# Patient Record
Sex: MALE | Race: WHITE | NOT HISPANIC OR LATINO | Employment: FULL TIME | URBAN - METROPOLITAN AREA
[De-identification: names, ages, dates, MRNs, and addresses within clinical notes are randomized per-mention and may not be internally consistent; named-entity substitution may affect disease eponyms.]

---

## 2018-01-02 ENCOUNTER — OFFICE VISIT CONVERTED (OUTPATIENT)
Dept: SURGERY | Facility: CLINIC | Age: 58
End: 2018-01-02
Attending: UROLOGY

## 2018-01-24 ENCOUNTER — CONVERSION ENCOUNTER (OUTPATIENT)
Dept: FAMILY MEDICINE CLINIC | Facility: CLINIC | Age: 58
End: 2018-01-24

## 2018-01-24 ENCOUNTER — OFFICE VISIT CONVERTED (OUTPATIENT)
Dept: FAMILY MEDICINE CLINIC | Facility: CLINIC | Age: 58
End: 2018-01-24
Attending: NURSE PRACTITIONER

## 2018-02-07 ENCOUNTER — CONVERSION ENCOUNTER (OUTPATIENT)
Dept: FAMILY MEDICINE CLINIC | Facility: CLINIC | Age: 58
End: 2018-02-07

## 2018-02-07 ENCOUNTER — OFFICE VISIT CONVERTED (OUTPATIENT)
Dept: FAMILY MEDICINE CLINIC | Facility: CLINIC | Age: 58
End: 2018-02-07
Attending: NURSE PRACTITIONER

## 2018-03-30 ENCOUNTER — CONVERSION ENCOUNTER (OUTPATIENT)
Dept: SURGERY | Facility: CLINIC | Age: 58
End: 2018-03-30

## 2018-03-30 ENCOUNTER — OFFICE VISIT CONVERTED (OUTPATIENT)
Dept: SURGERY | Facility: CLINIC | Age: 58
End: 2018-03-30
Attending: UROLOGY

## 2018-04-16 ENCOUNTER — OFFICE VISIT CONVERTED (OUTPATIENT)
Dept: SURGERY | Facility: CLINIC | Age: 58
End: 2018-04-16
Attending: UROLOGY

## 2018-05-01 ENCOUNTER — PROCEDURE VISIT CONVERTED (OUTPATIENT)
Dept: SURGERY | Facility: CLINIC | Age: 58
End: 2018-05-01
Attending: UROLOGY

## 2018-05-08 ENCOUNTER — OFFICE VISIT CONVERTED (OUTPATIENT)
Dept: SURGERY | Facility: CLINIC | Age: 58
End: 2018-05-08
Attending: UROLOGY

## 2018-07-05 ENCOUNTER — OFFICE VISIT CONVERTED (OUTPATIENT)
Dept: FAMILY MEDICINE CLINIC | Facility: CLINIC | Age: 58
End: 2018-07-05
Attending: FAMILY MEDICINE

## 2018-07-19 ENCOUNTER — OFFICE VISIT CONVERTED (OUTPATIENT)
Dept: FAMILY MEDICINE CLINIC | Facility: CLINIC | Age: 58
End: 2018-07-19
Attending: FAMILY MEDICINE

## 2018-08-28 ENCOUNTER — CONVERSION ENCOUNTER (OUTPATIENT)
Dept: FAMILY MEDICINE CLINIC | Facility: CLINIC | Age: 58
End: 2018-08-28

## 2018-08-28 ENCOUNTER — OFFICE VISIT CONVERTED (OUTPATIENT)
Dept: FAMILY MEDICINE CLINIC | Facility: CLINIC | Age: 58
End: 2018-08-28
Attending: NURSE PRACTITIONER

## 2018-09-19 ENCOUNTER — OFFICE VISIT CONVERTED (OUTPATIENT)
Dept: GASTROENTEROLOGY | Facility: CLINIC | Age: 58
End: 2018-09-19
Attending: NURSE PRACTITIONER

## 2018-11-08 ENCOUNTER — OFFICE VISIT CONVERTED (OUTPATIENT)
Dept: GASTROENTEROLOGY | Facility: CLINIC | Age: 58
End: 2018-11-08
Attending: NURSE PRACTITIONER

## 2018-12-06 ENCOUNTER — CONVERSION ENCOUNTER (OUTPATIENT)
Dept: OTHER | Facility: HOSPITAL | Age: 58
End: 2018-12-06

## 2018-12-06 ENCOUNTER — OFFICE VISIT CONVERTED (OUTPATIENT)
Dept: GASTROENTEROLOGY | Facility: CLINIC | Age: 58
End: 2018-12-06
Attending: NURSE PRACTITIONER

## 2018-12-10 ENCOUNTER — OFFICE VISIT CONVERTED (OUTPATIENT)
Dept: FAMILY MEDICINE CLINIC | Facility: CLINIC | Age: 58
End: 2018-12-10
Attending: FAMILY MEDICINE

## 2018-12-10 ENCOUNTER — CONVERSION ENCOUNTER (OUTPATIENT)
Dept: FAMILY MEDICINE CLINIC | Facility: CLINIC | Age: 58
End: 2018-12-10

## 2018-12-20 ENCOUNTER — CONVERSION ENCOUNTER (OUTPATIENT)
Dept: FAMILY MEDICINE CLINIC | Facility: CLINIC | Age: 58
End: 2018-12-20

## 2018-12-20 ENCOUNTER — OFFICE VISIT CONVERTED (OUTPATIENT)
Dept: FAMILY MEDICINE CLINIC | Facility: CLINIC | Age: 58
End: 2018-12-20
Attending: FAMILY MEDICINE

## 2019-01-08 ENCOUNTER — HOSPITAL ENCOUNTER (OUTPATIENT)
Dept: CARDIOLOGY | Facility: HOSPITAL | Age: 59
Discharge: HOME | End: 2019-01-08
Attending: FAMILY MEDICINE

## 2019-01-21 ENCOUNTER — HOSPITAL ENCOUNTER (OUTPATIENT)
Dept: FAMILY MEDICINE CLINIC | Facility: CLINIC | Age: 59
Discharge: HOME | End: 2019-01-21
Attending: FAMILY MEDICINE

## 2019-01-21 ENCOUNTER — OFFICE VISIT CONVERTED (OUTPATIENT)
Dept: FAMILY MEDICINE CLINIC | Facility: CLINIC | Age: 59
End: 2019-01-21
Attending: FAMILY MEDICINE

## 2019-01-21 LAB
BASOPHILS # BLD AUTO: 0 10*3/UL (ref 0–0.2)
BASOPHILS NFR BLD AUTO: 0 % (ref 0–3)
EOSINOPHIL # BLD AUTO: 0.09 10*3/UL (ref 0–0.7)
EOSINOPHIL # BLD AUTO: 1.31 % (ref 0–7)
ERYTHROCYTE [DISTWIDTH] IN BLOOD BY AUTOMATED COUNT: 13.4 % (ref 11.5–14.5)
HBA1C MFR BLD: 14.8 G/DL (ref 14–18)
HCT VFR BLD AUTO: 42.3 % (ref 42–52)
IRON SATN MFR SERPL: 13 % (ref 20–55)
IRON SERPL-MCNC: 50 UG/DL (ref 70–180)
LYMPHOCYTES # BLD AUTO: 2.42 10*3/UL (ref 1–5)
MCH RBC QN AUTO: 31.8 PG (ref 27–31)
MCHC RBC AUTO-ENTMCNC: 34.9 G/DL (ref 33–37)
MCV RBC AUTO: 91 FL (ref 80–96)
MONOCYTES # BLD AUTO: 0.42 10*3/UL (ref 0.2–1.2)
MONOCYTES NFR BLD AUTO: 6.34 % (ref 3–10)
NEUTROPHILS # BLD AUTO: 3.72 10*3/UL (ref 2–8)
NEUTROPHILS NFR BLD AUTO: 55.9 % (ref 30–85)
NRBC BLD AUTO-RTO: 0 % (ref 0–0.01)
PLATELET # BLD AUTO: 157 10*3/UL (ref 130–400)
PMV BLD AUTO: 8.7 FL (ref 7.4–10.4)
RBC # BLD AUTO: 4.65 10*6/UL (ref 4.7–6.1)
TIBC SERPL-MCNC: 383 UG/DL (ref 245–450)
TRANSFERRIN SERPL-MCNC: 268 MG/DL (ref 215–365)
VARIANT LYMPHS NFR BLD MANUAL: 36.4 % (ref 20–45)
WBC # BLD AUTO: 6.64 10*3/UL (ref 4.8–10.8)

## 2019-02-05 ENCOUNTER — HOSPITAL ENCOUNTER (OUTPATIENT)
Dept: GASTROENTEROLOGY | Facility: HOSPITAL | Age: 59
Setting detail: HOSPITAL OUTPATIENT SURGERY
Discharge: HOME | End: 2019-02-05
Attending: INTERNAL MEDICINE

## 2019-02-25 ENCOUNTER — HOSPITAL ENCOUNTER (OUTPATIENT)
Dept: ONCOLOGY | Facility: HOSPITAL | Age: 59
Discharge: HOME | End: 2019-02-25
Attending: NURSE PRACTITIONER

## 2019-02-25 LAB
ALBUMIN SERPL-MCNC: 4.1 G/DL (ref 3.5–5)
ALBUMIN/GLOB SERPL: 1.6 {RATIO} (ref 1.4–2.6)
ALP SERPL-CCNC: 103 U/L (ref 56–119)
ALT SERPL-CCNC: 20 U/L (ref 10–40)
ANION GAP SERPL CALC-SCNC: 15 MMOL/L (ref 8–19)
AST SERPL-CCNC: 19 U/L (ref 15–50)
BASOPHILS # BLD AUTO: 0.02 10*3/UL (ref 0–0.2)
BASOPHILS NFR BLD AUTO: 0.4 % (ref 0–3)
BILIRUB SERPL-MCNC: 0.44 MG/DL (ref 0.2–1.3)
BUN SERPL-MCNC: 10 MG/DL (ref 5–25)
BUN/CREAT SERPL: 12 {RATIO} (ref 6–20)
CALCIUM SERPL-MCNC: 9.3 MG/DL (ref 8.7–10.4)
CHLORIDE SERPL-SCNC: 104 MMOL/L (ref 99–111)
CONV ABS IMM GRAN: 0.01 10*3/UL (ref 0–0.2)
CONV CO2: 28 MMOL/L (ref 22–32)
CONV IMMATURE GRAN: 0.2 % (ref 0–1.8)
CONV TOTAL PROTEIN: 6.6 G/DL (ref 6.3–8.2)
CREAT UR-MCNC: 0.82 MG/DL (ref 0.7–1.2)
DEPRECATED RDW RBC AUTO: 46 FL (ref 35.1–43.9)
EOSINOPHIL # BLD AUTO: 0.14 10*3/UL (ref 0–0.7)
EOSINOPHIL # BLD AUTO: 2.5 % (ref 0–7)
ERYTHROCYTE [DISTWIDTH] IN BLOOD BY AUTOMATED COUNT: 13.5 % (ref 11.6–14.4)
FERRITIN SERPL-MCNC: 106 NG/ML (ref 30–300)
GFR SERPLBLD BASED ON 1.73 SQ M-ARVRAT: >60 ML/MIN/{1.73_M2}
GLOBULIN UR ELPH-MCNC: 2.5 G/DL (ref 2–3.5)
GLUCOSE SERPL-MCNC: 96 MG/DL (ref 70–99)
HBA1C MFR BLD: 14 G/DL (ref 14–18)
HCT VFR BLD AUTO: 42.8 % (ref 42–52)
IRON SATN MFR SERPL: 13 % (ref 20–55)
IRON SERPL-MCNC: 48 UG/DL (ref 70–180)
LYMPHOCYTES # BLD AUTO: 2.28 10*3/UL (ref 1–5)
MCH RBC QN AUTO: 30.4 PG (ref 27–31)
MCHC RBC AUTO-ENTMCNC: 32.7 G/DL (ref 33–37)
MCV RBC AUTO: 92.8 FL (ref 80–96)
MONOCYTES # BLD AUTO: 0.38 10*3/UL (ref 0.2–1.2)
MONOCYTES NFR BLD AUTO: 6.7 % (ref 3–10)
NEUTROPHILS # BLD AUTO: 2.86 10*3/UL (ref 2–8)
NEUTROPHILS NFR BLD AUTO: 50.1 % (ref 30–85)
NRBC CBCN: 0 % (ref 0–0.7)
OSMOLALITY SERPL CALC.SUM OF ELEC: 295 MOSM/KG (ref 273–304)
PLATELET # BLD AUTO: 143 10*3/UL (ref 130–400)
PMV BLD AUTO: 11.1 FL (ref 9.4–12.4)
POTASSIUM SERPL-SCNC: 4.3 MMOL/L (ref 3.5–5.3)
RBC # BLD AUTO: 4.61 10*6/UL (ref 4.7–6.1)
SODIUM SERPL-SCNC: 143 MMOL/L (ref 135–147)
TIBC SERPL-MCNC: 365 UG/DL (ref 245–450)
TRANSFERRIN SERPL-MCNC: 255 MG/DL (ref 215–365)
VARIANT LYMPHS NFR BLD MANUAL: 40.1 % (ref 20–45)
WBC # BLD AUTO: 5.69 10*3/UL (ref 4.8–10.8)

## 2019-06-04 ENCOUNTER — OFFICE VISIT CONVERTED (OUTPATIENT)
Dept: SURGERY | Facility: CLINIC | Age: 59
End: 2019-06-04
Attending: UROLOGY

## 2019-06-05 ENCOUNTER — HOSPITAL ENCOUNTER (OUTPATIENT)
Dept: OTHER | Facility: HOSPITAL | Age: 59
Discharge: HOME | End: 2019-06-05

## 2019-06-05 LAB — PSA SERPL-MCNC: 12.15 NG/ML (ref 0–4)

## 2019-06-27 ENCOUNTER — HOSPITAL ENCOUNTER (OUTPATIENT)
Dept: ONCOLOGY | Facility: HOSPITAL | Age: 59
Discharge: HOME | End: 2019-06-27
Attending: NURSE PRACTITIONER

## 2019-06-27 LAB
BASOPHILS # BLD AUTO: 0.02 10*3/UL (ref 0–0.2)
BASOPHILS NFR BLD AUTO: 0.3 % (ref 0–3)
CONV ABS IMM GRAN: 0.03 10*3/UL (ref 0–0.2)
CONV IMMATURE GRAN: 0.4 % (ref 0–1.8)
DEPRECATED RDW RBC AUTO: 49.7 FL (ref 35.1–43.9)
EOSINOPHIL # BLD AUTO: 0.02 10*3/UL (ref 0–0.7)
EOSINOPHIL # BLD AUTO: 0.3 % (ref 0–7)
ERYTHROCYTE [DISTWIDTH] IN BLOOD BY AUTOMATED COUNT: 14.6 % (ref 11.6–14.4)
FERRITIN SERPL-MCNC: 265 NG/ML (ref 30–300)
HBA1C MFR BLD: 13.4 G/DL (ref 14–18)
HCT VFR BLD AUTO: 40.5 % (ref 42–52)
IRON SATN MFR SERPL: 9 % (ref 20–55)
IRON SERPL-MCNC: 31 UG/DL (ref 70–180)
LYMPHOCYTES # BLD AUTO: 1.94 10*3/UL (ref 1–5)
MCH RBC QN AUTO: 30.7 PG (ref 27–31)
MCHC RBC AUTO-ENTMCNC: 33.1 G/DL (ref 33–37)
MCV RBC AUTO: 92.7 FL (ref 80–96)
MONOCYTES # BLD AUTO: 0.62 10*3/UL (ref 0.2–1.2)
MONOCYTES NFR BLD AUTO: 8.2 % (ref 3–10)
NEUTROPHILS # BLD AUTO: 4.9 10*3/UL (ref 2–8)
NEUTROPHILS NFR BLD AUTO: 65 % (ref 30–85)
NRBC CBCN: 0 % (ref 0–0.7)
PLATELET # BLD AUTO: 144 10*3/UL (ref 130–400)
PMV BLD AUTO: 11.1 FL (ref 9.4–12.4)
RBC # BLD AUTO: 4.37 10*6/UL (ref 4.7–6.1)
TIBC SERPL-MCNC: 356 UG/DL (ref 245–450)
TRANSFERRIN SERPL-MCNC: 249 MG/DL (ref 215–365)
VARIANT LYMPHS NFR BLD MANUAL: 25.8 % (ref 20–45)
WBC # BLD AUTO: 7.53 10*3/UL (ref 4.8–10.8)

## 2019-07-22 ENCOUNTER — HOSPITAL ENCOUNTER (OUTPATIENT)
Dept: SURGERY | Facility: CLINIC | Age: 59
Discharge: HOME | End: 2019-07-22

## 2019-07-22 ENCOUNTER — CONVERSION ENCOUNTER (OUTPATIENT)
Dept: SURGERY | Facility: CLINIC | Age: 59
End: 2019-07-22

## 2019-07-22 ENCOUNTER — OFFICE VISIT CONVERTED (OUTPATIENT)
Dept: SURGERY | Facility: CLINIC | Age: 59
End: 2019-07-22
Attending: UROLOGY

## 2019-07-22 LAB
APPEARANCE UR: CLEAR
BILIRUB UR QL: NEGATIVE
COLOR UR: YELLOW
CONV BACTERIA: NEGATIVE
CONV COLLECTION SOURCE (UA): ABNORMAL
CONV CRYSTALS: ABNORMAL /[HPF]
CONV HYALINE CASTS IN URINE MICRO: ABNORMAL /[LPF]
CONV UROBILINOGEN IN URINE BY AUTOMATED TEST STRIP: 0.2 {EHRLICHU}/DL (ref 0.1–1)
GLUCOSE UR QL: NEGATIVE MG/DL
HGB UR QL STRIP: ABNORMAL
KETONES UR QL STRIP: NEGATIVE MG/DL
LEUKOCYTE ESTERASE UR QL STRIP: NEGATIVE
NITRITE UR QL STRIP: NEGATIVE
PH UR STRIP.AUTO: 5 [PH] (ref 5–8)
PROT UR QL: NEGATIVE MG/DL
RBC #/AREA URNS HPF: ABNORMAL /[HPF]
SP GR UR: 1.02 (ref 1–1.03)
WBC #/AREA URNS HPF: ABNORMAL /[HPF]

## 2019-09-19 ENCOUNTER — HOSPITAL ENCOUNTER (OUTPATIENT)
Dept: FAMILY MEDICINE CLINIC | Facility: CLINIC | Age: 59
Discharge: HOME | End: 2019-09-19
Attending: FAMILY MEDICINE

## 2019-09-19 ENCOUNTER — OFFICE VISIT CONVERTED (OUTPATIENT)
Dept: FAMILY MEDICINE CLINIC | Facility: CLINIC | Age: 59
End: 2019-09-19
Attending: FAMILY MEDICINE

## 2019-09-19 LAB
ALBUMIN SERPL-MCNC: 4 G/DL (ref 3.5–5)
ALBUMIN/GLOB SERPL: 1.4 {RATIO} (ref 1.4–2.6)
ALP SERPL-CCNC: 109 U/L (ref 56–155)
ALT SERPL-CCNC: 15 U/L (ref 10–40)
ANION GAP SERPL CALC-SCNC: 23 MMOL/L (ref 8–19)
AST SERPL-CCNC: 19 U/L (ref 15–50)
BASOPHILS # BLD AUTO: 0.03 10*3/UL (ref 0–0.2)
BASOPHILS NFR BLD AUTO: 0.4 % (ref 0–3)
BILIRUB SERPL-MCNC: 0.53 MG/DL (ref 0.2–1.3)
BUN SERPL-MCNC: 15 MG/DL (ref 5–25)
BUN/CREAT SERPL: 17 {RATIO} (ref 6–20)
CALCIUM SERPL-MCNC: 9.2 MG/DL (ref 8.7–10.4)
CHLORIDE SERPL-SCNC: 100 MMOL/L (ref 99–111)
CHOLEST SERPL-MCNC: 131 MG/DL (ref 107–200)
CHOLEST/HDLC SERPL: 3 {RATIO} (ref 3–6)
CONV ABS IMM GRAN: 0.02 10*3/UL (ref 0–0.2)
CONV CO2: 21 MMOL/L (ref 22–32)
CONV IMMATURE GRAN: 0.3 % (ref 0–1.8)
CONV TOTAL PROTEIN: 6.9 G/DL (ref 6.3–8.2)
CREAT UR-MCNC: 0.86 MG/DL (ref 0.7–1.2)
DEPRECATED RDW RBC AUTO: 44.1 FL (ref 35.1–43.9)
EOSINOPHIL # BLD AUTO: 0.07 10*3/UL (ref 0–0.7)
EOSINOPHIL # BLD AUTO: 1 % (ref 0–7)
ERYTHROCYTE [DISTWIDTH] IN BLOOD BY AUTOMATED COUNT: 13.1 % (ref 11.6–14.4)
FOLATE SERPL-MCNC: >20 NG/ML (ref 4.8–20)
GFR SERPLBLD BASED ON 1.73 SQ M-ARVRAT: >60 ML/MIN/{1.73_M2}
GLOBULIN UR ELPH-MCNC: 2.9 G/DL (ref 2–3.5)
GLUCOSE SERPL-MCNC: 97 MG/DL (ref 70–99)
HCT VFR BLD AUTO: 41.2 % (ref 42–52)
HDLC SERPL-MCNC: 44 MG/DL (ref 40–60)
HGB BLD-MCNC: 13.7 G/DL (ref 14–18)
IRON SATN MFR SERPL: 9 % (ref 20–55)
IRON SERPL-MCNC: 30 UG/DL (ref 70–180)
LDLC SERPL CALC-MCNC: 76 MG/DL (ref 70–100)
LYMPHOCYTES # BLD AUTO: 1.91 10*3/UL (ref 1–5)
LYMPHOCYTES NFR BLD AUTO: 28.4 % (ref 20–45)
MCH RBC QN AUTO: 30.7 PG (ref 27–31)
MCHC RBC AUTO-ENTMCNC: 33.3 G/DL (ref 33–37)
MCV RBC AUTO: 92.4 FL (ref 80–96)
MONOCYTES # BLD AUTO: 0.49 10*3/UL (ref 0.2–1.2)
MONOCYTES NFR BLD AUTO: 7.3 % (ref 3–10)
NEUTROPHILS # BLD AUTO: 4.21 10*3/UL (ref 2–8)
NEUTROPHILS NFR BLD AUTO: 62.6 % (ref 30–85)
NRBC CBCN: 0 % (ref 0–0.7)
OSMOLALITY SERPL CALC.SUM OF ELEC: 291 MOSM/KG (ref 273–304)
PLATELET # BLD AUTO: 123 10*3/UL (ref 130–400)
PMV BLD AUTO: 11.1 FL (ref 9.4–12.4)
POTASSIUM SERPL-SCNC: 4 MMOL/L (ref 3.5–5.3)
PSA SERPL-MCNC: 7.37 NG/ML (ref 0–4)
RBC # BLD AUTO: 4.46 10*6/UL (ref 4.7–6.1)
SODIUM SERPL-SCNC: 140 MMOL/L (ref 135–147)
TIBC SERPL-MCNC: 329 UG/DL (ref 245–450)
TRANSFERRIN SERPL-MCNC: 230 MG/DL (ref 215–365)
TRIGL SERPL-MCNC: 56 MG/DL (ref 40–150)
VIT B12 SERPL-MCNC: 360 PG/ML (ref 211–911)
VLDLC SERPL-MCNC: 11 MG/DL (ref 5–37)
WBC # BLD AUTO: 6.73 10*3/UL (ref 4.8–10.8)

## 2019-09-30 ENCOUNTER — OFFICE VISIT CONVERTED (OUTPATIENT)
Dept: FAMILY MEDICINE CLINIC | Facility: CLINIC | Age: 59
End: 2019-09-30
Attending: FAMILY MEDICINE

## 2019-10-02 ENCOUNTER — HOSPITAL ENCOUNTER (OUTPATIENT)
Dept: ONCOLOGY | Facility: HOSPITAL | Age: 59
Discharge: HOME | End: 2019-10-02
Attending: NURSE PRACTITIONER

## 2019-10-04 ENCOUNTER — HOSPITAL ENCOUNTER (OUTPATIENT)
Dept: FAMILY MEDICINE CLINIC | Facility: CLINIC | Age: 59
Discharge: HOME | End: 2019-10-04
Attending: FAMILY MEDICINE

## 2019-10-08 ENCOUNTER — HOSPITAL ENCOUNTER (OUTPATIENT)
Dept: OTHER | Facility: HOSPITAL | Age: 59
Setting detail: RECURRING SERIES
Discharge: HOME | End: 2019-10-31
Attending: NURSE PRACTITIONER

## 2019-11-27 ENCOUNTER — HOSPITAL ENCOUNTER (OUTPATIENT)
Dept: OTHER | Facility: HOSPITAL | Age: 59
Discharge: HOME | End: 2019-11-27
Attending: INTERNAL MEDICINE

## 2019-12-11 ENCOUNTER — HOSPITAL ENCOUNTER (OUTPATIENT)
Dept: OTHER | Facility: HOSPITAL | Age: 59
Discharge: HOME | End: 2019-12-11

## 2019-12-11 LAB — PSA SERPL-MCNC: 7.46 NG/ML (ref 0–4)

## 2019-12-18 ENCOUNTER — CONVERSION ENCOUNTER (OUTPATIENT)
Dept: SURGERY | Facility: CLINIC | Age: 59
End: 2019-12-18

## 2019-12-18 ENCOUNTER — OFFICE VISIT CONVERTED (OUTPATIENT)
Dept: UROLOGY | Facility: CLINIC | Age: 59
End: 2019-12-18
Attending: UROLOGY

## 2020-01-02 ENCOUNTER — HOSPITAL ENCOUNTER (OUTPATIENT)
Dept: ONCOLOGY | Facility: HOSPITAL | Age: 60
Discharge: HOME | End: 2020-01-02
Attending: NURSE PRACTITIONER

## 2020-01-02 LAB
BASOPHILS # BLD AUTO: 0.03 10*3/UL (ref 0–0.2)
BASOPHILS NFR BLD AUTO: 0.4 % (ref 0–3)
CONV ABS IMM GRAN: 0.02 10*3/UL (ref 0–0.2)
CONV IMMATURE GRAN: 0.3 % (ref 0–1.8)
DEPRECATED RDW RBC AUTO: 48.3 FL (ref 35.1–43.9)
EOSINOPHIL # BLD AUTO: 0.15 10*3/UL (ref 0–0.7)
EOSINOPHIL # BLD AUTO: 2 % (ref 0–7)
ERYTHROCYTE [DISTWIDTH] IN BLOOD BY AUTOMATED COUNT: 13.9 % (ref 11.6–14.4)
FERRITIN SERPL-MCNC: 815 NG/ML (ref 30–300)
HCT VFR BLD AUTO: 49.8 % (ref 42–52)
HGB BLD-MCNC: 16.4 G/DL (ref 14–18)
IRON SATN MFR SERPL: 24 % (ref 20–55)
IRON SERPL-MCNC: 89 UG/DL (ref 70–180)
LYMPHOCYTES # BLD AUTO: 2.2 10*3/UL (ref 1–5)
LYMPHOCYTES NFR BLD AUTO: 29.5 % (ref 20–45)
MCH RBC QN AUTO: 31.2 PG (ref 27–31)
MCHC RBC AUTO-ENTMCNC: 32.9 G/DL (ref 33–37)
MCV RBC AUTO: 94.7 FL (ref 80–96)
MONOCYTES # BLD AUTO: 0.49 10*3/UL (ref 0.2–1.2)
MONOCYTES NFR BLD AUTO: 6.6 % (ref 3–10)
NEUTROPHILS # BLD AUTO: 4.57 10*3/UL (ref 2–8)
NEUTROPHILS NFR BLD AUTO: 61.2 % (ref 30–85)
NRBC CBCN: 0 % (ref 0–0.7)
PLATELET # BLD AUTO: 130 10*3/UL (ref 130–400)
PMV BLD AUTO: 11.2 FL (ref 9.4–12.4)
RBC # BLD AUTO: 5.26 10*6/UL (ref 4.7–6.1)
TIBC SERPL-MCNC: 365 UG/DL (ref 245–450)
TRANSFERRIN SERPL-MCNC: 255 MG/DL (ref 215–365)
WBC # BLD AUTO: 7.46 10*3/UL (ref 4.8–10.8)

## 2020-03-25 ENCOUNTER — HOSPITAL ENCOUNTER (OUTPATIENT)
Dept: OTHER | Facility: HOSPITAL | Age: 60
Discharge: HOME | End: 2020-03-25
Attending: NURSE PRACTITIONER

## 2020-03-25 LAB
BASOPHILS # BLD AUTO: 0.05 10*3/UL (ref 0–0.2)
BASOPHILS NFR BLD AUTO: 0.6 % (ref 0–3)
CONV ABS IMM GRAN: 0.03 10*3/UL (ref 0–0.2)
CONV IMMATURE GRAN: 0.4 % (ref 0–1.8)
DEPRECATED RDW RBC AUTO: 46.3 FL (ref 35.1–43.9)
EOSINOPHIL # BLD AUTO: 0.24 10*3/UL (ref 0–0.7)
EOSINOPHIL # BLD AUTO: 2.9 % (ref 0–7)
ERYTHROCYTE [DISTWIDTH] IN BLOOD BY AUTOMATED COUNT: 13.1 % (ref 11.6–14.4)
FERRITIN SERPL-MCNC: 659 NG/ML (ref 30–300)
HCT VFR BLD AUTO: 42.9 % (ref 42–52)
HGB BLD-MCNC: 14.1 G/DL (ref 14–18)
IRON SATN MFR SERPL: 34 % (ref 20–55)
IRON SERPL-MCNC: 102 UG/DL (ref 70–180)
LYMPHOCYTES # BLD AUTO: 3.51 10*3/UL (ref 1–5)
LYMPHOCYTES NFR BLD AUTO: 42.9 % (ref 20–45)
MCH RBC QN AUTO: 32.1 PG (ref 27–31)
MCHC RBC AUTO-ENTMCNC: 32.9 G/DL (ref 33–37)
MCV RBC AUTO: 97.7 FL (ref 80–96)
MONOCYTES # BLD AUTO: 0.47 10*3/UL (ref 0.2–1.2)
MONOCYTES NFR BLD AUTO: 5.7 % (ref 3–10)
NEUTROPHILS # BLD AUTO: 3.88 10*3/UL (ref 2–8)
NEUTROPHILS NFR BLD AUTO: 47.5 % (ref 30–85)
NRBC CBCN: 0 % (ref 0–0.7)
PLATELET # BLD AUTO: 188 10*3/UL (ref 130–400)
PMV BLD AUTO: 12.4 FL (ref 9.4–12.4)
PSA SERPL-MCNC: 9.07 NG/ML (ref 0–4)
RBC # BLD AUTO: 4.39 10*6/UL (ref 4.7–6.1)
TIBC SERPL-MCNC: 303 UG/DL (ref 245–450)
TRANSFERRIN SERPL-MCNC: 212 MG/DL (ref 215–365)
WBC # BLD AUTO: 8.18 10*3/UL (ref 4.8–10.8)

## 2020-04-02 ENCOUNTER — HOSPITAL ENCOUNTER (OUTPATIENT)
Dept: ONCOLOGY | Facility: HOSPITAL | Age: 60
Discharge: HOME | End: 2020-04-02
Attending: NURSE PRACTITIONER

## 2020-04-17 ENCOUNTER — TELEPHONE CONVERTED (OUTPATIENT)
Dept: UROLOGY | Facility: CLINIC | Age: 60
End: 2020-04-17
Attending: UROLOGY

## 2020-06-01 ENCOUNTER — HOSPITAL ENCOUNTER (OUTPATIENT)
Dept: OTHER | Facility: HOSPITAL | Age: 60
Discharge: HOME | End: 2020-06-01
Attending: UROLOGY

## 2020-06-01 LAB — PSA SERPL-MCNC: 8.12 NG/ML (ref 0–4)

## 2020-06-10 ENCOUNTER — OFFICE VISIT CONVERTED (OUTPATIENT)
Dept: UROLOGY | Facility: CLINIC | Age: 60
End: 2020-06-10
Attending: UROLOGY

## 2020-06-10 ENCOUNTER — CONVERSION ENCOUNTER (OUTPATIENT)
Dept: SURGERY | Facility: CLINIC | Age: 60
End: 2020-06-10

## 2020-07-29 ENCOUNTER — HOSPITAL ENCOUNTER (OUTPATIENT)
Dept: OTHER | Facility: HOSPITAL | Age: 60
Discharge: HOME | End: 2020-07-29
Attending: NURSE PRACTITIONER

## 2020-07-29 LAB
ALBUMIN SERPL-MCNC: 4 G/DL (ref 3.5–5)
ALBUMIN/GLOB SERPL: 1.5 {RATIO} (ref 1.4–2.6)
ALP SERPL-CCNC: 93 U/L (ref 56–155)
ALT SERPL-CCNC: 19 U/L (ref 10–40)
ANION GAP SERPL CALC-SCNC: 18 MMOL/L (ref 8–19)
AST SERPL-CCNC: 20 U/L (ref 15–50)
BASOPHILS # BLD AUTO: 0.02 10*3/UL (ref 0–0.2)
BASOPHILS NFR BLD AUTO: 0.3 % (ref 0–3)
BILIRUB SERPL-MCNC: 0.46 MG/DL (ref 0.2–1.3)
BUN SERPL-MCNC: 15 MG/DL (ref 5–25)
BUN/CREAT SERPL: 16 {RATIO} (ref 6–20)
CALCIUM SERPL-MCNC: 9.9 MG/DL (ref 8.7–10.4)
CHLORIDE SERPL-SCNC: 103 MMOL/L (ref 99–111)
CONV ABS IMM GRAN: 0.03 10*3/UL (ref 0–0.2)
CONV CO2: 25 MMOL/L (ref 22–32)
CONV IMMATURE GRAN: 0.4 % (ref 0–1.8)
CONV TOTAL PROTEIN: 6.7 G/DL (ref 6.3–8.2)
CREAT UR-MCNC: 0.93 MG/DL (ref 0.7–1.2)
DEPRECATED RDW RBC AUTO: 45.4 FL (ref 35.1–43.9)
EOSINOPHIL # BLD AUTO: 0.09 10*3/UL (ref 0–0.7)
EOSINOPHIL # BLD AUTO: 1.2 % (ref 0–7)
ERYTHROCYTE [DISTWIDTH] IN BLOOD BY AUTOMATED COUNT: 13.1 % (ref 11.6–14.4)
GFR SERPLBLD BASED ON 1.73 SQ M-ARVRAT: >60 ML/MIN/{1.73_M2}
GLOBULIN UR ELPH-MCNC: 2.7 G/DL (ref 2–3.5)
GLUCOSE SERPL-MCNC: 90 MG/DL (ref 70–99)
HCT VFR BLD AUTO: 43.3 % (ref 42–52)
HGB BLD-MCNC: 14.3 G/DL (ref 14–18)
LDH SERPL-CCNC: 220 U/L (ref 120–240)
LYMPHOCYTES # BLD AUTO: 2.51 10*3/UL (ref 1–5)
LYMPHOCYTES NFR BLD AUTO: 33 % (ref 20–45)
MCH RBC QN AUTO: 31.6 PG (ref 27–31)
MCHC RBC AUTO-ENTMCNC: 33 G/DL (ref 33–37)
MCV RBC AUTO: 95.8 FL (ref 80–96)
MONOCYTES # BLD AUTO: 0.45 10*3/UL (ref 0.2–1.2)
MONOCYTES NFR BLD AUTO: 5.9 % (ref 3–10)
NEUTROPHILS # BLD AUTO: 4.51 10*3/UL (ref 2–8)
NEUTROPHILS NFR BLD AUTO: 59.2 % (ref 30–85)
NRBC CBCN: 0 % (ref 0–0.7)
OSMOLALITY SERPL CALC.SUM OF ELEC: 292 MOSM/KG (ref 273–304)
PLATELET # BLD AUTO: 123 10*3/UL (ref 130–400)
PMV BLD AUTO: 11.2 FL (ref 9.4–12.4)
POTASSIUM SERPL-SCNC: 4.5 MMOL/L (ref 3.5–5.3)
RBC # BLD AUTO: 4.52 10*6/UL (ref 4.7–6.1)
SODIUM SERPL-SCNC: 141 MMOL/L (ref 135–147)
WBC # BLD AUTO: 7.61 10*3/UL (ref 4.8–10.8)

## 2020-08-06 ENCOUNTER — OFFICE VISIT CONVERTED (OUTPATIENT)
Dept: ONCOLOGY | Facility: HOSPITAL | Age: 60
End: 2020-08-06
Attending: NURSE PRACTITIONER

## 2020-08-06 ENCOUNTER — HOSPITAL ENCOUNTER (OUTPATIENT)
Dept: ONCOLOGY | Facility: HOSPITAL | Age: 60
Discharge: HOME | End: 2020-08-06
Attending: NURSE PRACTITIONER

## 2020-08-17 ENCOUNTER — OFFICE VISIT CONVERTED (OUTPATIENT)
Dept: FAMILY MEDICINE CLINIC | Facility: CLINIC | Age: 60
End: 2020-08-17
Attending: FAMILY MEDICINE

## 2020-08-21 ENCOUNTER — HOSPITAL ENCOUNTER (OUTPATIENT)
Dept: FAMILY MEDICINE CLINIC | Facility: CLINIC | Age: 60
Discharge: HOME | End: 2020-08-21
Attending: FAMILY MEDICINE

## 2020-08-21 LAB
ALBUMIN SERPL-MCNC: 4.4 G/DL (ref 3.5–5)
ALBUMIN/GLOB SERPL: 1.9 {RATIO} (ref 1.4–2.6)
ALP SERPL-CCNC: 107 U/L (ref 56–155)
ALT SERPL-CCNC: 15 U/L (ref 10–40)
ANION GAP SERPL CALC-SCNC: 21 MMOL/L (ref 8–19)
AST SERPL-CCNC: 20 U/L (ref 15–50)
BASOPHILS # BLD AUTO: 0.03 10*3/UL (ref 0–0.2)
BASOPHILS NFR BLD AUTO: 0.5 % (ref 0–3)
BILIRUB SERPL-MCNC: 1.04 MG/DL (ref 0.2–1.3)
BUN SERPL-MCNC: 9 MG/DL (ref 5–25)
BUN/CREAT SERPL: 11 {RATIO} (ref 6–20)
CALCIUM SERPL-MCNC: 9.7 MG/DL (ref 8.7–10.4)
CHLORIDE SERPL-SCNC: 99 MMOL/L (ref 99–111)
CHOLEST SERPL-MCNC: 125 MG/DL (ref 107–200)
CHOLEST/HDLC SERPL: 3.1 {RATIO} (ref 3–6)
CONV ABS IMM GRAN: 0.03 10*3/UL (ref 0–0.2)
CONV CO2: 23 MMOL/L (ref 22–32)
CONV IMMATURE GRAN: 0.5 % (ref 0–1.8)
CONV TOTAL PROTEIN: 6.7 G/DL (ref 6.3–8.2)
CREAT UR-MCNC: 0.82 MG/DL (ref 0.7–1.2)
DEPRECATED RDW RBC AUTO: 45.9 FL (ref 35.1–43.9)
EOSINOPHIL # BLD AUTO: 0.05 10*3/UL (ref 0–0.7)
EOSINOPHIL # BLD AUTO: 0.8 % (ref 0–7)
ERYTHROCYTE [DISTWIDTH] IN BLOOD BY AUTOMATED COUNT: 12.9 % (ref 11.6–14.4)
GFR SERPLBLD BASED ON 1.73 SQ M-ARVRAT: >60 ML/MIN/{1.73_M2}
GLOBULIN UR ELPH-MCNC: 2.3 G/DL (ref 2–3.5)
GLUCOSE SERPL-MCNC: 102 MG/DL (ref 70–99)
HCT VFR BLD AUTO: 45 % (ref 42–52)
HDLC SERPL-MCNC: 40 MG/DL (ref 40–60)
HGB BLD-MCNC: 15 G/DL (ref 14–18)
LDLC SERPL CALC-MCNC: 75 MG/DL (ref 70–100)
LYMPHOCYTES # BLD AUTO: 2.03 10*3/UL (ref 1–5)
LYMPHOCYTES NFR BLD AUTO: 30.5 % (ref 20–45)
MCH RBC QN AUTO: 31.8 PG (ref 27–31)
MCHC RBC AUTO-ENTMCNC: 33.3 G/DL (ref 33–37)
MCV RBC AUTO: 95.3 FL (ref 80–96)
MONOCYTES # BLD AUTO: 0.37 10*3/UL (ref 0.2–1.2)
MONOCYTES NFR BLD AUTO: 5.6 % (ref 3–10)
NEUTROPHILS # BLD AUTO: 4.14 10*3/UL (ref 2–8)
NEUTROPHILS NFR BLD AUTO: 62.1 % (ref 30–85)
NRBC CBCN: 0 % (ref 0–0.7)
OSMOLALITY SERPL CALC.SUM OF ELEC: 287 MOSM/KG (ref 273–304)
PLATELET # BLD AUTO: 109 10*3/UL (ref 130–400)
PMV BLD AUTO: 10.9 FL (ref 9.4–12.4)
POTASSIUM SERPL-SCNC: 4.2 MMOL/L (ref 3.5–5.3)
PSA SERPL-MCNC: 8.51 NG/ML (ref 0–4)
RBC # BLD AUTO: 4.72 10*6/UL (ref 4.7–6.1)
SODIUM SERPL-SCNC: 139 MMOL/L (ref 135–147)
TRIGL SERPL-MCNC: 50 MG/DL (ref 40–150)
VLDLC SERPL-MCNC: 10 MG/DL (ref 5–37)
WBC # BLD AUTO: 6.65 10*3/UL (ref 4.8–10.8)

## 2020-12-02 ENCOUNTER — HOSPITAL ENCOUNTER (OUTPATIENT)
Dept: OTHER | Facility: HOSPITAL | Age: 60
Discharge: HOME | End: 2020-12-02
Attending: UROLOGY

## 2020-12-02 LAB — PSA SERPL-MCNC: 7.71 NG/ML (ref 0–4)

## 2020-12-09 ENCOUNTER — OFFICE VISIT CONVERTED (OUTPATIENT)
Dept: UROLOGY | Facility: CLINIC | Age: 60
End: 2020-12-09
Attending: UROLOGY

## 2020-12-18 ENCOUNTER — HOSPITAL ENCOUNTER (OUTPATIENT)
Dept: OTHER | Facility: HOSPITAL | Age: 60
Discharge: HOME | End: 2020-12-18
Attending: INTERNAL MEDICINE

## 2020-12-23 ENCOUNTER — OFFICE VISIT CONVERTED (OUTPATIENT)
Dept: ONCOLOGY | Facility: HOSPITAL | Age: 60
End: 2020-12-23
Attending: PHYSICIAN ASSISTANT

## 2021-02-05 ENCOUNTER — HOSPITAL ENCOUNTER (OUTPATIENT)
Dept: ONCOLOGY | Facility: HOSPITAL | Age: 61
Discharge: HOME | End: 2021-02-05
Attending: NURSE PRACTITIONER

## 2021-02-05 ENCOUNTER — OFFICE VISIT CONVERTED (OUTPATIENT)
Dept: ONCOLOGY | Facility: HOSPITAL | Age: 61
End: 2021-02-05
Attending: NURSE PRACTITIONER

## 2021-02-05 LAB
ALBUMIN SERPL-MCNC: 4.1 G/DL (ref 3.5–5)
ALBUMIN/GLOB SERPL: 1.6 {RATIO} (ref 1.4–2.6)
ALP SERPL-CCNC: 104 U/L (ref 56–155)
ALT SERPL-CCNC: 22 U/L (ref 10–40)
ANION GAP SERPL CALC-SCNC: 13 MMOL/L (ref 8–19)
AST SERPL-CCNC: 21 U/L (ref 15–50)
BASOPHILS # BLD AUTO: 0.01 10*3/UL (ref 0–0.2)
BASOPHILS NFR BLD AUTO: 0.2 % (ref 0–3)
BILIRUB SERPL-MCNC: 0.85 MG/DL (ref 0.2–1.3)
BUN SERPL-MCNC: 13 MG/DL (ref 5–25)
BUN/CREAT SERPL: 17 {RATIO} (ref 6–20)
CALCIUM SERPL-MCNC: 9.2 MG/DL (ref 8.7–10.4)
CHLORIDE SERPL-SCNC: 103 MMOL/L (ref 99–111)
CONV ABS IMM GRAN: 0.02 10*3/UL (ref 0–0.54)
CONV CO2: 27 MMOL/L (ref 22–32)
CONV EOSINOPHILS PERCENT BY MANUAL COUNT: 2.7 % (ref 0–7)
CONV IMMATURE GRAN: 0.4 % (ref 0–0.4)
CONV TOTAL PROTEIN: 6.6 G/DL (ref 6.3–8.2)
CREAT UR-MCNC: 0.75 MG/DL (ref 0.7–1.2)
EOSINOPHIL # BLD MANUAL: 0.14 10*3/UL (ref 0–0.7)
ERYTHROCYTE [DISTWIDTH] IN BLOOD BY AUTOMATED COUNT: 13.1 % (ref 11.5–14.5)
ERYTHROCYTE [DISTWIDTH] IN BLOOD BY AUTOMATED COUNT: 45 FL
FERRITIN SERPL-MCNC: 576 NG/ML (ref 30–300)
FOLATE SERPL-MCNC: 12.7 NG/ML (ref 4.8–20)
GFR SERPLBLD BASED ON 1.73 SQ M-ARVRAT: >60 ML/MIN/{1.73_M2}
GLOBULIN UR ELPH-MCNC: 2.5 G/DL (ref 2–3.5)
GLUCOSE SERPL-MCNC: 100 MG/DL (ref 70–99)
HBA1C MFR BLD: 14.9 G/DL (ref 14–18)
HCT VFR BLD AUTO: 44.6 % (ref 42–52)
IRON SATN MFR SERPL: 36 % (ref 20–55)
IRON SERPL-MCNC: 120 UG/DL (ref 70–180)
LYMPHOCYTES # BLD AUTO: 2.16 10*3/UL (ref 1–5)
LYMPHOCYTES NFR BLD AUTO: 41.9 % (ref 20–45)
MCH RBC QN AUTO: 31.2 PG (ref 27–31)
MCHC RBC AUTO-ENTMCNC: 33.4 G/DL (ref 33–37)
MCV RBC AUTO: 93.5 FL (ref 80–96)
MONOCYTES # BLD AUTO: 0.52 10*3/UL (ref 0.2–1.2)
MONOCYTES NFR BLD MANUAL: 10.1 % (ref 3–10)
NEUTROPHILS # BLD AUTO: 2.31 10*3/UL (ref 2–8)
NEUTROPHILS NFR BLD MANUAL: 44.7 % (ref 30–85)
OSMOLALITY SERPL CALC.SUM OF ELEC: 288 MOSM/KG (ref 273–304)
PLATELET # BLD AUTO: 126 10*3/UL (ref 130–400)
PMV BLD AUTO: 10.7 FL (ref 7.4–10.4)
POTASSIUM SERPL-SCNC: 4.2 MMOL/L (ref 3.5–5.3)
RBC MORPH BLD: 4.77 10*6/UL (ref 4.7–6.1)
SODIUM SERPL-SCNC: 139 MMOL/L (ref 135–147)
TIBC SERPL-MCNC: 336 UG/DL (ref 245–450)
TRANSFERRIN SERPL-MCNC: 235 MG/DL (ref 215–365)
VIT B12 SERPL-MCNC: 458 PG/ML (ref 211–911)
WBC # BLD AUTO: 5.16 10*3/UL (ref 4.8–10.8)

## 2021-02-25 ENCOUNTER — OFFICE VISIT CONVERTED (OUTPATIENT)
Dept: FAMILY MEDICINE CLINIC | Facility: CLINIC | Age: 61
End: 2021-02-25
Attending: FAMILY MEDICINE

## 2021-02-27 ENCOUNTER — HOSPITAL ENCOUNTER (OUTPATIENT)
Dept: FAMILY MEDICINE CLINIC | Facility: CLINIC | Age: 61
Discharge: HOME | End: 2021-02-27
Attending: FAMILY MEDICINE

## 2021-02-27 LAB
ALBUMIN SERPL-MCNC: 3.8 G/DL (ref 3.5–5)
ALBUMIN/GLOB SERPL: 1.6 {RATIO} (ref 1.4–2.6)
ALP SERPL-CCNC: 103 U/L (ref 56–155)
ALT SERPL-CCNC: 16 U/L (ref 10–40)
ANION GAP SERPL CALC-SCNC: 13 MMOL/L (ref 8–19)
AST SERPL-CCNC: 19 U/L (ref 15–50)
BASOPHILS # BLD AUTO: 0.04 10*3/UL (ref 0–0.2)
BASOPHILS NFR BLD AUTO: 0.9 % (ref 0–3)
BILIRUB SERPL-MCNC: 0.86 MG/DL (ref 0.2–1.3)
BUN SERPL-MCNC: 12 MG/DL (ref 5–25)
BUN/CREAT SERPL: 15 {RATIO} (ref 6–20)
CALCIUM SERPL-MCNC: 9 MG/DL (ref 8.7–10.4)
CHLORIDE SERPL-SCNC: 105 MMOL/L (ref 99–111)
CHOLEST SERPL-MCNC: 145 MG/DL (ref 107–200)
CHOLEST/HDLC SERPL: 3.7 {RATIO} (ref 3–6)
CONV ABS IMM GRAN: 0.01 10*3/UL (ref 0–0.2)
CONV CO2: 27 MMOL/L (ref 22–32)
CONV IMMATURE GRAN: 0.2 % (ref 0–1.8)
CONV TOTAL PROTEIN: 6.2 G/DL (ref 6.3–8.2)
CREAT UR-MCNC: 0.79 MG/DL (ref 0.7–1.2)
DEPRECATED RDW RBC AUTO: 45.1 FL (ref 35.1–43.9)
EOSINOPHIL # BLD AUTO: 0.16 10*3/UL (ref 0–0.7)
EOSINOPHIL # BLD AUTO: 3.5 % (ref 0–7)
ERYTHROCYTE [DISTWIDTH] IN BLOOD BY AUTOMATED COUNT: 13.2 % (ref 11.6–14.4)
GFR SERPLBLD BASED ON 1.73 SQ M-ARVRAT: >60 ML/MIN/{1.73_M2}
GLOBULIN UR ELPH-MCNC: 2.4 G/DL (ref 2–3.5)
GLUCOSE SERPL-MCNC: 95 MG/DL (ref 70–99)
HCT VFR BLD AUTO: 43.9 % (ref 42–52)
HDLC SERPL-MCNC: 39 MG/DL (ref 40–60)
HGB BLD-MCNC: 14.7 G/DL (ref 14–18)
LDLC SERPL CALC-MCNC: 92 MG/DL (ref 70–100)
LYMPHOCYTES # BLD AUTO: 2.05 10*3/UL (ref 1–5)
LYMPHOCYTES NFR BLD AUTO: 44.6 % (ref 20–45)
MCH RBC QN AUTO: 31.3 PG (ref 27–31)
MCHC RBC AUTO-ENTMCNC: 33.5 G/DL (ref 33–37)
MCV RBC AUTO: 93.4 FL (ref 80–96)
MONOCYTES # BLD AUTO: 0.4 10*3/UL (ref 0.2–1.2)
MONOCYTES NFR BLD AUTO: 8.7 % (ref 3–10)
NEUTROPHILS # BLD AUTO: 1.94 10*3/UL (ref 2–8)
NEUTROPHILS NFR BLD AUTO: 42.1 % (ref 30–85)
NRBC CBCN: 0 % (ref 0–0.7)
OSMOLALITY SERPL CALC.SUM OF ELEC: 290 MOSM/KG (ref 273–304)
PLATELET # BLD AUTO: 127 10*3/UL (ref 130–400)
PMV BLD AUTO: 10.9 FL (ref 9.4–12.4)
POTASSIUM SERPL-SCNC: 4.5 MMOL/L (ref 3.5–5.3)
RBC # BLD AUTO: 4.7 10*6/UL (ref 4.7–6.1)
SODIUM SERPL-SCNC: 140 MMOL/L (ref 135–147)
TRIGL SERPL-MCNC: 71 MG/DL (ref 40–150)
VLDLC SERPL-MCNC: 14 MG/DL (ref 5–37)
WBC # BLD AUTO: 4.6 10*3/UL (ref 4.8–10.8)

## 2021-03-11 ENCOUNTER — HOSPITAL ENCOUNTER (OUTPATIENT)
Dept: CARDIOLOGY | Facility: HOSPITAL | Age: 61
Discharge: HOME | End: 2021-03-11
Attending: PHYSICIAN ASSISTANT

## 2021-03-12 ENCOUNTER — OFFICE VISIT CONVERTED (OUTPATIENT)
Dept: FAMILY MEDICINE CLINIC | Facility: CLINIC | Age: 61
End: 2021-03-12
Attending: FAMILY MEDICINE

## 2021-03-12 ENCOUNTER — HOSPITAL ENCOUNTER (OUTPATIENT)
Dept: FAMILY MEDICINE CLINIC | Facility: CLINIC | Age: 61
Discharge: HOME | End: 2021-03-12
Attending: FAMILY MEDICINE

## 2021-03-12 ENCOUNTER — CONVERSION ENCOUNTER (OUTPATIENT)
Dept: FAMILY MEDICINE CLINIC | Facility: CLINIC | Age: 61
End: 2021-03-12

## 2021-03-13 LAB
BASOPHILS # BLD AUTO: 0.01 10*3/UL (ref 0–0.2)
BASOPHILS NFR BLD AUTO: 0.1 % (ref 0–3)
CONV ABS IMM GRAN: 0.02 10*3/UL (ref 0–0.2)
CONV IMMATURE GRAN: 0.3 % (ref 0–1.8)
DEPRECATED RDW RBC AUTO: 47.6 FL (ref 35.1–43.9)
EOSINOPHIL # BLD AUTO: 0.11 10*3/UL (ref 0–0.7)
EOSINOPHIL # BLD AUTO: 1.6 % (ref 0–7)
ERYTHROCYTE [DISTWIDTH] IN BLOOD BY AUTOMATED COUNT: 13.5 % (ref 11.6–14.4)
HCT VFR BLD AUTO: 44.3 % (ref 42–52)
HGB BLD-MCNC: 14.3 G/DL (ref 14–18)
LYMPHOCYTES # BLD AUTO: 2.38 10*3/UL (ref 1–5)
LYMPHOCYTES NFR BLD AUTO: 35.3 % (ref 20–45)
MCH RBC QN AUTO: 31.3 PG (ref 27–31)
MCHC RBC AUTO-ENTMCNC: 32.3 G/DL (ref 33–37)
MCV RBC AUTO: 96.9 FL (ref 80–96)
MONOCYTES # BLD AUTO: 0.52 10*3/UL (ref 0.2–1.2)
MONOCYTES NFR BLD AUTO: 7.7 % (ref 3–10)
NEUTROPHILS # BLD AUTO: 3.71 10*3/UL (ref 2–8)
NEUTROPHILS NFR BLD AUTO: 55 % (ref 30–85)
NRBC CBCN: 0 % (ref 0–0.7)
PLATELET # BLD AUTO: 132 10*3/UL (ref 130–400)
PMV BLD AUTO: 11.4 FL (ref 9.4–12.4)
RBC # BLD AUTO: 4.57 10*6/UL (ref 4.7–6.1)
WBC # BLD AUTO: 6.75 10*3/UL (ref 4.8–10.8)

## 2021-04-05 ENCOUNTER — HOSPITAL ENCOUNTER (OUTPATIENT)
Dept: VACCINE CLINIC | Facility: HOSPITAL | Age: 61
Discharge: HOME | End: 2021-04-05
Attending: INTERNAL MEDICINE

## 2021-04-26 ENCOUNTER — HOSPITAL ENCOUNTER (OUTPATIENT)
Dept: VACCINE CLINIC | Facility: HOSPITAL | Age: 61
Discharge: HOME | End: 2021-04-26
Attending: INTERNAL MEDICINE

## 2021-05-09 VITALS
SYSTOLIC BLOOD PRESSURE: 110 MMHG | DIASTOLIC BLOOD PRESSURE: 60 MMHG | WEIGHT: 165 LBS | OXYGEN SATURATION: 96 % | TEMPERATURE: 97.2 F | HEART RATE: 89 BPM

## 2021-05-09 VITALS
TEMPERATURE: 98.9 F | WEIGHT: 168.5 LBS | SYSTOLIC BLOOD PRESSURE: 110 MMHG | DIASTOLIC BLOOD PRESSURE: 60 MMHG | OXYGEN SATURATION: 96 % | HEART RATE: 75 BPM

## 2021-05-09 VITALS
OXYGEN SATURATION: 97 % | WEIGHT: 166 LBS | HEART RATE: 95 BPM | SYSTOLIC BLOOD PRESSURE: 110 MMHG | DIASTOLIC BLOOD PRESSURE: 80 MMHG | TEMPERATURE: 98.4 F

## 2021-05-09 VITALS
SYSTOLIC BLOOD PRESSURE: 110 MMHG | BODY MASS INDEX: 24.05 KG/M2 | OXYGEN SATURATION: 97 % | TEMPERATURE: 97.4 F | WEIGHT: 162.37 LBS | HEIGHT: 69 IN | HEART RATE: 96 BPM | DIASTOLIC BLOOD PRESSURE: 74 MMHG

## 2021-05-09 VITALS
SYSTOLIC BLOOD PRESSURE: 124 MMHG | TEMPERATURE: 98.6 F | BODY MASS INDEX: 23.1 KG/M2 | OXYGEN SATURATION: 98 % | HEART RATE: 106 BPM | DIASTOLIC BLOOD PRESSURE: 78 MMHG | WEIGHT: 165 LBS | HEIGHT: 71 IN

## 2021-05-09 VITALS
WEIGHT: 184.06 LBS | SYSTOLIC BLOOD PRESSURE: 116 MMHG | DIASTOLIC BLOOD PRESSURE: 70 MMHG | TEMPERATURE: 97.4 F | OXYGEN SATURATION: 97 % | HEART RATE: 98 BPM

## 2021-05-09 VITALS
SYSTOLIC BLOOD PRESSURE: 118 MMHG | DIASTOLIC BLOOD PRESSURE: 68 MMHG | TEMPERATURE: 98.2 F | WEIGHT: 180 LBS | HEART RATE: 108 BPM | OXYGEN SATURATION: 96 %

## 2021-05-09 VITALS
SYSTOLIC BLOOD PRESSURE: 104 MMHG | HEART RATE: 95 BPM | WEIGHT: 175.56 LBS | TEMPERATURE: 98 F | BODY MASS INDEX: 26 KG/M2 | OXYGEN SATURATION: 99 % | HEIGHT: 69 IN | DIASTOLIC BLOOD PRESSURE: 66 MMHG

## 2021-05-09 VITALS
OXYGEN SATURATION: 97 % | SYSTOLIC BLOOD PRESSURE: 122 MMHG | DIASTOLIC BLOOD PRESSURE: 60 MMHG | HEART RATE: 130 BPM | HEIGHT: 69 IN | WEIGHT: 167 LBS | TEMPERATURE: 104.9 F | BODY MASS INDEX: 24.73 KG/M2

## 2021-05-09 VITALS
TEMPERATURE: 97.6 F | DIASTOLIC BLOOD PRESSURE: 68 MMHG | SYSTOLIC BLOOD PRESSURE: 118 MMHG | OXYGEN SATURATION: 98 % | WEIGHT: 180.12 LBS | HEART RATE: 91 BPM

## 2021-05-09 VITALS
SYSTOLIC BLOOD PRESSURE: 122 MMHG | WEIGHT: 180 LBS | HEART RATE: 80 BPM | OXYGEN SATURATION: 98 % | DIASTOLIC BLOOD PRESSURE: 82 MMHG

## 2021-05-09 VITALS
HEART RATE: 106 BPM | DIASTOLIC BLOOD PRESSURE: 80 MMHG | OXYGEN SATURATION: 95 % | SYSTOLIC BLOOD PRESSURE: 120 MMHG | WEIGHT: 174.5 LBS | TEMPERATURE: 98.2 F

## 2021-05-09 VITALS
SYSTOLIC BLOOD PRESSURE: 128 MMHG | DIASTOLIC BLOOD PRESSURE: 74 MMHG | TEMPERATURE: 97.6 F | BODY MASS INDEX: 26.07 KG/M2 | HEART RATE: 70 BPM | WEIGHT: 172 LBS | OXYGEN SATURATION: 98 % | HEIGHT: 68 IN

## 2021-05-12 NOTE — PROGRESS NOTES
Quick Note      Patient Name: Andry Harper   Patient ID: [id number]   Sex: Male   YOB: 1960    Primary Care Provider: Keenan Craft MD   Referring Provider: Keenan Craft MD    Visit Date: April 17, 2020    Provider: Michelle Hightower MD   Location: [facility]   Location Address: [address] [place] [address]  [id number]   Location Phone: [phone]          History Of Present Illness  TELEHEALTH TELEPHONE VISIT  Chief Complaint: Elevated PSA   Andry Harper is a 59 year old /White male who is presenting for evaluation via telehealth telephone visit. Verbal consent obtained before beginning visit.   Provider spent 7 minutes with the patient during telehealth visit.   The following staff were present during this visit: Alyssa Hawkins and Michelle Hightower MD   Past Medical History/Overview of Patient Symptoms     Patient is a 60yo male. He presents for elevated PSA. He had a negative biopsy in 2017.  The patient and I discussed the results which showed no evidence of cancer. I discussed the possibility of a false negative. He expressed understanding. His PSA has been going up and we have been watching it.  He also   6/4/19 - Patient presents in follow up. He is doing well. He denies bothersome urinary issues. He has not had a recent PSA.    7/22/19 - Patient presents in follow up. He is doing well. He has no urinary issues. He had an MRI of the prostate and this showed no areas suggestive of clinically significant prostate cancer. The volume of the gland was 77ml. The patient did have a recent PSA and this was 12. This was increased from 6 at the time of his biopsy. I have recommended following this.    12/18/2018: Patient is here for follow-up of his elevated PSA.  It was recently checked and is found to be 7.5 at this point.  That is down from 12.  It was at 6 at the time of his prostate biopsy which was negative.  He is also had an MRI of his prostate which showed no areas  suggestive of clinically significant prostate cancer.           Assessment  · Elevated PSA     790.93/R97.20      Plan  · Medications  o Medications have been Reconciled  o Transition of Care or Provider Policy  · Instructions  o I discussed with the patient that the PSA is increased but the prior biopsy is negative and the MRI shows no suspicious areas. I discussed that the PSA is concerning. I have recommended repeating the PSA in a few months and I will see him then. He will have a prostate exam at that time.   o Plan Of Care:   o Chronic conditions reviewed and taken into consideration for today's treatment plan.  o Patient instructed to seek medical attention urgently for new or worsening symptoms.  o Patient was educated/instructed on their diagnosis, treatment and medications prior to discharge from the clinic today.  o Discussed Covid-19 precautions including, but not limited to, social distancing, avoid touching your face, and hand washing.             Electronically Signed by: Pippa Barnett-, -Author on April 29, 2020 11:00:18 AM  Electronically Co-signed by: Michelle Hightower MD -Reviewer on April 29, 2020 01:55:38 PM

## 2021-05-13 NOTE — PROGRESS NOTES
Progress Note      Patient Name: Andry Harper   Patient ID: [id number]   Sex: Male   YOB: 1960    Primary Care Provider: Keenan Craft MD    Visit Date: December 9, 2020    Provider: Michelle Hightower MD   Location: [facility]   Location Address: [address] [place] [address]  [id number]   Location Phone: [phone]          History Of Present Illness     Patient is a 61yo male. He presents for results of TRUS bx for elevated PSA. The patient and I discussed the results which showed no evidence of cancer. I discussed the possibility of a false negative. He expressed understanding. He has done well since the biopsy.    6/4/19 - Patient presents in follow up. He is doing well. He denies bothersome urinary issues. He has not had a recent PSA.    7/22/19 - Patient presents in follow up. He is doing well. He has no urinary issues. He had an MRI of the prostate and this showed no areas suggestive of clinically significant prostate cancer. The volume of the gland was 77ml. The patient did have a recent PSA and this was 12. This was increased from 6 at the time of his biopsy. I have recommended following this.    12/18/2019: Patient is here for follow-up of his elevated PSA.  It was recently checked and is found to be 7.5 at this point.  That is down from 12.  It was at 6 at the time of his prostate biopsy which was negative.  He is also had an MRI of his prostate which showed no areas suggestive of clinically significant prostate cancer.    6/10/20:  He is having more issues with frequency and urgency.  He states he has a slow stream at times.  He would like to try medicine for that.  His most recent PSA was 8 which is down from a high of 12 but up from last check at 6.  He had a negative prostate MRI but that was over a year ago.      12/9/20:  His most recent PSA in Nov 2020 is now 7.71 which is down for him.  He had a negative prostate MRI in 2019 and again in June 2020.  He had a  "negative prostate biopsy in the past when his PSA was 6.  It has been as high as 12 in the past.       Past Medical History  Anemia; BPH without urinary obstruction; Elevated PSA; Hyperlipemia; Pneumonia         Past Surgical History  Bronchoscopy; Colonoscopy; Prostate Biopsy         Medication List  atorvastatin 20 mg oral tablet; Symbicort 160-4.5 mcg/actuation inhalation HFA aerosol inhaler; tamsulosin 0.4 mg oral capsule; Ventolin HFA 90 mcg/actuation inhalation HFA aerosol inhaler         Allergy List  PENICILLINS       Allergies Reconciled  Family Medical History  Lymphoma, Malignant; -Father's Family History Unknown; -Mother's Family History Unknown; Diabetes; No family history of colorectal cancer; Family history of stroke; Family history of heart disease         Social History  Alcohol (Current some day); lives alone; Tobacco (Never); Working         Vitals  Date Time BP Position Site L\R Cuff Size HR RR TEMP (F) WT  HT  BMI kg/m2 BSA m2 O2 Sat FR L/min FiO2 HC       12/09/2020 04:47 /56 Sitting       170lbs 0oz 5'  11\" 23.71 1.97             Physical Examination  · Constitutional  o Appearance  o : well-nourished, well developed, alert, in no acute distress  · Head and Face  o Head  o :   § Inspection  § : atraumatic, normocephalic  o Face  o :   § Inspection  § : no facial lesions  · Eyes  o Sclerae  o : sclerae white  · Ears, Nose, Mouth and Throat  o Ears  o :   § External Ears  § : appearance within normal limits, no lesions present  o Nose  o :   § External Nose  § : appearance normal  · Neck  o Inspection/Palpation  o : normal appearance, no masses or tenderness, trachea midline  · Respiratory  o Respiratory Effort  o : breathing unlabored  · Skin and Subcutaneous Tissue  o General Inspection  o : no rashes or lesions present, no lesions present, no areas of discoloration  · Neurologic  o Mental Status Examination  o :   § Orientation  § : grossly oriented to person, place and " time  § Speech/Language  § : communication ability within normal limits  o Gait and Station  o : normal gait, able to stand without difficulty  · Psychiatric  o Judgement and Insight  o : judgment and insight intact, judgement for everyday activities and social situations within normal limits, insight intact  o Mood and Affect  o : mood normal, affect appropriate              Assessment  · Elevated PSA     790.93/R97.20  · BPH without urinary obstruction     600.00/N40.0      Plan  · Orders  o PSA ultrasensitive DIAGNOSTIC [facility] (61523, 27103, 06570, 11854) - 790.93/R97.20 - 06/09/2021  · Medications  o Medications have been Reconciled  o Transition of Care or Provider Policy  · Instructions  o I discussed with the patient that the PSA is decreased and that the prior biopsy and prostate MRIs have been negative. I have recommended repeating the PSA in 6 months. F/U in 6 months with repeat PSA. Continue Flomax.             Electronically Signed by: Michelle Hightower MD -Author on December 9, 2020 05:08:33 PM

## 2021-05-13 NOTE — PROGRESS NOTES
Progress Note      Patient Name: Andry Harper   Patient ID: [id number]   Sex: Male   YOB: 1960    Primary Care Provider: Keenan Craft MD   Referring Provider: Keenan Craft MD    Visit Date: Quin 10, 2020    Provider: Michelle Hightower MD   Location: [facility]   Location Address: [address] [place] [address]  [id number]   Location Phone: [phone]          Chief Complaint  · annual elevated PSA      History Of Present Illness     Patient is a 61yo male. He presents for results of TRUS bx for elevated PSA. The patient and I discussed the results which showed no evidence of cancer. I discussed the possibility of a false negative. He expressed understanding. He has done well since the biopsy.    6/4/19 - Patient presents in follow up. He is doing well. He denies bothersome urinary issues. He has not had a recent PSA.    7/22/19 - Patient presents in follow up. He is doing well. He has no urinary issues. He had an MRI of the prostate and this showed no areas suggestive of clinically significant prostate cancer. The volume of the gland was 77ml. The patient did have a recent PSA and this was 12. This was increased from 6 at the time of his biopsy. I have recommended following this.    12/18/2018: Patient is here for follow-up of his elevated PSA.  It was recently checked and is found to be 7.5 at this point.  That is down from 12.  It was at 6 at the time of his prostate biopsy which was negative.  He is also had an MRI of his prostate which showed no areas suggestive of clinically significant prostate cancer.    6/10/20:  He is having more issues with frequency and urgency.  He states he has a slow stream at times.  He would like to try medicine for that.  His most recent PSA was 8 which is down from a high of 12 but up from last check at 6.  He had a negative prostate MRI but that was over a year ago.             Past Medical History  Anemia; BPH without urinary obstruction; Elevated  "PSA; Hyperlipemia; Pneumonia         Past Surgical History  Bronchoscopy; Colonoscopy; Prostate Biopsy         Medication List  atorvastatin 20 mg oral tablet; Symbicort 160-4.5 mcg/actuation inhalation HFA aerosol inhaler; Ventolin HFA 90 mcg/actuation inhalation HFA aerosol inhaler         Allergy List  PENICILLINS         Family Medical History  Lymphoma, Malignant; Diabetes; No family history of colorectal cancer; Family history of stroke; Family history of heart disease         Social History  Alcohol (Current some day); lives alone; Tobacco (Never); Working         Review of Systems  · Constitutional  o Denies  o : chills, fever  · Gastrointestinal  o Denies  o : nausea, vomiting      Vitals  Date Time BP Position Site L\R Cuff Size HR RR TEMP (F) WT  HT  BMI kg/m2 BSA m2 O2 Sat HC       06/10/2020 04:27 /55 Sitting       168lbs 0oz 5'  11\" 23.43 1.95           Physical Examination  · Constitutional  o Appearance  o : well-nourished, well developed, alert, in no acute distress  · Head and Face  o Head  o :   § Inspection  § : atraumatic, normocephalic  o Face  o :   § Inspection  § : no facial lesions  · Eyes  o Sclerae  o : sclerae white  · Ears, Nose, Mouth and Throat  o Ears  o :   § External Ears  § : appearance within normal limits, no lesions present  o Nose  o :   § External Nose  § : appearance normal  · Neck  o Inspection/Palpation  o : normal appearance, no masses or tenderness, trachea midline  · Respiratory  o Respiratory Effort  o : breathing unlabored  · Skin and Subcutaneous Tissue  o General Inspection  o : no rashes or lesions present, no lesions present, no areas of discoloration  · Neurologic  o Mental Status Examination  o :   § Orientation  § : grossly oriented to person, place and time  § Speech/Language  § : communication ability within normal limits  o Gait and Station  o : normal gait, able to stand without difficulty  · Psychiatric  o Judgement and Insight  o : judgment and " insight intact, judgement for everyday activities and social situations within normal limits, insight intact  o Mood and Affect  o : mood normal, affect appropriate              Assessment  · Elevated PSA     790.93/R97.20  · BPH without urinary obstruction     600.00/N40.0      Plan  · Orders  o MRI prostate wo then w contrast (59895) - 790.93/R97.20 - 06/10/2020  · Medications  o tamsulosin 0.4 mg oral capsule   SIG: take 1 capsule (0.4 mg) by oral route once daily 1/2 hour following the same meal each day for 30 days   DISP: (30) capsules with 11 refills  Prescribed on 06/10/2020     o Medications have been Reconciled  o Transition of Care or Provider Policy  · Instructions  o We will repeat his prostate MRI. I will call him with those results. I am sending in Flomax for him.   o Electronically Identified Patient Education Materials Provided Electronically            Electronically Signed by: Michelle Hightower MD -Author on Quin 10, 2020 05:14:15 PM

## 2021-05-14 VITALS
WEIGHT: 170 LBS | SYSTOLIC BLOOD PRESSURE: 116 MMHG | BODY MASS INDEX: 23.8 KG/M2 | DIASTOLIC BLOOD PRESSURE: 56 MMHG | HEIGHT: 71 IN

## 2021-05-15 VITALS
SYSTOLIC BLOOD PRESSURE: 117 MMHG | BODY MASS INDEX: 23.52 KG/M2 | HEIGHT: 71 IN | WEIGHT: 168 LBS | DIASTOLIC BLOOD PRESSURE: 55 MMHG

## 2021-05-15 VITALS
HEIGHT: 71 IN | BODY MASS INDEX: 22.56 KG/M2 | DIASTOLIC BLOOD PRESSURE: 65 MMHG | WEIGHT: 161.12 LBS | SYSTOLIC BLOOD PRESSURE: 135 MMHG

## 2021-05-15 VITALS
WEIGHT: 180 LBS | HEIGHT: 71 IN | SYSTOLIC BLOOD PRESSURE: 138 MMHG | TEMPERATURE: 98.8 F | DIASTOLIC BLOOD PRESSURE: 86 MMHG | BODY MASS INDEX: 25.2 KG/M2 | HEART RATE: 94 BPM

## 2021-05-15 VITALS — RESPIRATION RATE: 16 BRPM | HEIGHT: 71 IN | BODY MASS INDEX: 22.4 KG/M2 | WEIGHT: 160 LBS

## 2021-05-15 VITALS — WEIGHT: 161 LBS | BODY MASS INDEX: 22.54 KG/M2 | RESPIRATION RATE: 12 BRPM | HEIGHT: 71 IN

## 2021-05-16 VITALS — HEIGHT: 71 IN | BODY MASS INDEX: 23.8 KG/M2 | WEIGHT: 170 LBS | RESPIRATION RATE: 16 BRPM

## 2021-05-16 VITALS — RESPIRATION RATE: 16 BRPM | BODY MASS INDEX: 23.8 KG/M2 | WEIGHT: 170 LBS | HEIGHT: 71 IN

## 2021-05-16 VITALS
HEART RATE: 76 BPM | HEIGHT: 71 IN | WEIGHT: 178 LBS | DIASTOLIC BLOOD PRESSURE: 66 MMHG | SYSTOLIC BLOOD PRESSURE: 131 MMHG | BODY MASS INDEX: 24.92 KG/M2

## 2021-05-16 VITALS — HEIGHT: 71 IN | BODY MASS INDEX: 23.8 KG/M2 | WEIGHT: 170 LBS | RESPIRATION RATE: 14 BRPM

## 2021-05-16 VITALS — BODY MASS INDEX: 23.8 KG/M2 | WEIGHT: 170 LBS | RESPIRATION RATE: 16 BRPM | HEIGHT: 71 IN

## 2021-05-16 VITALS
SYSTOLIC BLOOD PRESSURE: 134 MMHG | DIASTOLIC BLOOD PRESSURE: 68 MMHG | WEIGHT: 179 LBS | HEIGHT: 71 IN | BODY MASS INDEX: 25.06 KG/M2 | TEMPERATURE: 98.6 F | HEART RATE: 87 BPM

## 2021-05-22 ENCOUNTER — TRANSCRIBE ORDERS (OUTPATIENT)
Dept: ADMINISTRATIVE | Facility: HOSPITAL | Age: 61
End: 2021-05-22

## 2021-05-22 DIAGNOSIS — R91.1 LUNG NODULE: Primary | ICD-10-CM

## 2021-05-28 VITALS
SYSTOLIC BLOOD PRESSURE: 119 MMHG | TEMPERATURE: 98.9 F | DIASTOLIC BLOOD PRESSURE: 55 MMHG | RESPIRATION RATE: 18 BRPM | BODY MASS INDEX: 22.97 KG/M2 | WEIGHT: 164.68 LBS | OXYGEN SATURATION: 100 % | HEART RATE: 68 BPM

## 2021-05-28 VITALS
OXYGEN SATURATION: 98 % | HEART RATE: 67 BPM | WEIGHT: 166.89 LBS | BODY MASS INDEX: 23.28 KG/M2 | SYSTOLIC BLOOD PRESSURE: 117 MMHG | DIASTOLIC BLOOD PRESSURE: 58 MMHG | TEMPERATURE: 98 F | RESPIRATION RATE: 18 BRPM

## 2021-05-28 NOTE — PROGRESS NOTES
Patient: YESSY GUPTA     Acct: [id number]     Report: #[id number]  UNIT #: [id number]     : 1960    Encounter Date:2021  PRIMARY CARE: NEY HERRMANN  ***Signed***  --------------------------------------------------------------------------------------------------------------------  NURSE INTAKE      Visit Type      Established Patient Visit            Chief Complaint      SHAHEEN            Referring Provider/Copies To      Primary Care Provider:  NEY HERRMANN      Copies To:   NEY HERRMANN            History and Present Illness      Past History      Mr. Gupta is a well known patient to [facility] for previous evaluation of     thrombocytopenia which resolved.  Most recently found to have iron deficiency.      In 2018 Iron was 43  TIBC 347 and Percent saturation 12.  H/H was 14.9 /    43.2 MCV 89.      He underwent EGD and colonoscopy 10/26/2018.  Colonoscopy with 2 polyps and a     cecal AVM was noted and bleeding this was cauterized. EGD revealed a hiatal her    misha.              In 2018 Iron was 59  TIBC 405 and Percent saturation 19 with H/H     13.9/40.4 and Platelet count 150,000.  This was repeated again in 2019     with Iron 50 TIBC 383 Percent sat 15  H/H 14.8/42 and Platelet count 157,000.      He started on oral iron 2 months ago.  Denies hematemesis, melena and BRB with     BMs.  He denies fatigue and pagophagia.            HPI - Hematology Interim            1) Iron deficiency anemia:             Mr. Yessy Gupta presents for  6 month follow up for SHAHEEN. He reports he has     recently seen Dr. Bryant and completed CT scan. Reports he was told he chronic     inflammation in the lungs, chronic lung disease. He reports he has shortness of     breath with activitiy but this is tolerable at this time. He denies having     pneumonia vaccines in the past.             Patient was to have labs done prior to visit but were not completed.              He denies any GI blood loss, weight loss, or persistent fatigue.             Last completed labs in August of 2020 and CBC was normal. Completed Injectafer     infusions last in October 2019.  Colonoscopy and EGD completed and all WNL.             Patient has history of elevated PSA and follows with Dr. Hightower ( urology) and     is on Flomax for this. Previous prostrate biopsy done on 5/1/2018 was benign.             2) Thrombocytopenia: Platelet count decreased to 126,000. Trending downward     since around 8/2019.            PAST, FAMILY   Past Medical History      Past Medical History:  Hypertension      Other PMH:        cecal AVM      chronic lung disease      Enlarged prostrate      Hematology/Oncology (M):  Anemia      Other Hematology History:        SHAHEEN            Past Surgical History      Lung Biopsy            colonoscopy      EGD      prostrate biopsy            Family History      Family History:  Lymphoma            Social History      Lives independently:  Yes      Number of Children:  0      Occupation:  WORKING            Tobacco Use      Tobacco status:  Never smoker            Substance Use      Substance use:  Denies use            REVIEW OF SYSTEMS      General:  Admits: Fatigue;          Denies: Appetite Change, Fever, Night Sweats, Weight Gain, Weight Loss      Eye:  Denies Blurred Vision, Denies Corrective Lenses, Denies Diplopia, Denies     Vision Changes      ENT:  Denies Headache, Denies Hearing Loss, Denies Hoarseness, Denies Sore     Throat      Cardiovascular:  Denies Chest Pain, Denies Palpitations      Respiratory:  Denies: Cough, Coughing Blood, Productive Cough, Shortness of Air,    Wheezing      Gastrointestinal:  Denies Bloody Stools, Denies Constipation, Denies Diarrhea,     Denies Nausea/Vomiting, Denies Problem Swallowing, Denies Unable to Control     Bowels      Genitourinary:  Denies Blood in Urine, Denies Incontinence, Denies Painful     Urination      Musculoskeletal:   Denies Back Pain, Denies Muscle Pain, Denies Painful Joints      Integumentary:  Denies Itching, Denies Lesions, Denies Rash      Neurologic:  Denies Dizziness, Denies Numbness\Tingling, Denies Seizures      Psychiatric:  Denies Anxiety, Denies Depression      Endocrine:  Denies Cold Intolerance, Denies Heat Intolerance      Hematologic/Lymphatic:  Denies Bruising, Denies Bleeding, Denies Enlarged Lymph     Nodes            VITAL SIGNS AND SCORES      Vitals      Weight 164 lbs 10.938 oz / 74.7 kg      Temperature 98.9 F / 37.17 C - Temporal      Pulse 68      Respirations 18      Blood Pressure 119/55 Sitting, Left Arm      Pulse Oximetry 100%, RM AIR            Pain Score      Experiencing any pain?:  No      Pain Scale Used:  Numerical      Pain Intensity:  0            Fatigue Score      Experiencing any fatigue?:  Yes      Fatigue (0-10 scale):  2            EXAM      General appearance:  in no apparent distress, cooperative, appears stated age.      HEENT: No pallor, no icterus, oral mucosa moist      Neck: Supple, trachea central-not deviated      Lymph nodes: none palpable peripherally      Cardiovascular: S1-S2 heard, no murmurs, no rubs, no gallops.      Breast exam:      Respiratory: Clear to auscultation bilaterally, no adventitious sounds      Abdomen/gastro: Soft, nontender, no palpable hepatosplenomegaly, bowel sounds     heard      Skin: No lesions, no rashes, no petechiae.      Extremities: No pedal edema, peripheral pulses felt, no clubbing      Musculoskeletal: Normal tone, no rigidity of muscles; range of motion intact      Spine: No deformities      Psychiatric: Alert and oriented x3, appropriate affect, intact judgment      Neurologic: Cranial nerves II through XII grossly intact, no gross neurological     deficits noted.            PREVENTION      Hx Influenza Vaccination:  Yes      Date Influenza Vaccine Given:  Oct 2, 2020      Influenza Vaccine Declined:  No      2 or More Falls in Past  Year?:  No      Fall Past Year with Injury?:  No      Hx Pneumococcal Vaccination:  No      Encouraged to follow-up with:  PCP regarding preventative exams.      Chart initiated by      DENA FAM MA            ALLERGY/MEDS      Allergies      Coded Allergies:             PENICILLINS (Verified  Allergy, Mild, 2/5/21)                  rash            Medications      Last Reconciled on 2/5/21 14:17 by ARABELLA LANDERS      Saccharomyces Boulardii (Florastor) 250 Mg Capsule      250 MG PO Q12HR, CAP         Reported         12/23/20       Budesonide/Formoterol Fumarate (Symbicort 160/4.5 Mcg) 10.2 Gm Inh      2 PUFF INH BID for 30 Days, #1 INH 4 Refills         Prov: JUS ROBERTS         10/1/20       Tamsulosin HCL (Tamsulosin HCL) 0.4 Mg Capsule      0.4 MG PO HS, #30 CAP 0 Refills         Reported         8/6/20       Budesonide/Formoterol Fumarate (Symbicort 160/4.5 Mcg) 10.2 Gm Inh               Prov: JUS ROBERTS         12/18/19       MDI-Albuterol (Ventolin HFA) 18 Gm Hfa.aer.ad      2 PUFFS INH Q4-6H PRN for SHORTNESS OF BREATH, #1 MDI 4 Refills         Prov: JUS ROBERTS         12/18/19       Atorvastatin (Atorvastatin) 40 Mg Tablet      40 MG PO HS, #30 TAB 0 Refills         Reported         10/3/17      Medications Reviewed:  No Changes made to meds            IMPRESSION/PLAN      Impression      1) Iron deficiency anemia:             Mr. Andry Harper presents for  6 month follow up for SHAHEEN. He reports he has     recently seen Dr. Roberts and completed CT scan. Reports he was told he chronic     inflammation in the lungs, chronic lung disease. He reports he has shortness of     breath with activitiy but this is tolerable at this time. He denies having     pneumonia vaccines in the past.             Patient was to have labs done prior to visit but were not completed.             He denies any GI blood loss, weight loss, or persistent fatigue.             Last completed labs in August of 2020 and CBC was  normal. Completed Injectafer     infusions last in October 2019.  Colonoscopy and EGD completed and all WNL.             Patient has history of elevated PSA and follows with Dr. Hightower ( urology) and     is on Flomax for this. Previous prostrate biopsy done on 5/1/2018 was benign.             2) Thrombocytopenia: Platelet count decreased to 126,000. Trending downward     since around 8/2019. Denies any bleeding or bruising. Will check a folate, B-12     level today.            Plan      1) labs today. Recheck CBC, iron panel, ferritin, folate and B-12            2) Will continue to follow for decreasing platelets. Denies any bleeding or     bruising.             Will call with results once back. Results are all normal.             Return in 6 months with labs on same day of service.            Pain      Pain Zero Today            Advanced Care Plan Discussion      Declines Discussion 1124F            Patient Education            Anemia      Platelet Count      Patient Education Provided:  Yes            Electronically signed by ARABELLA LANDERS onc  02/05/2021 14:17       Disclaimer: Converted document may not contain table formatting or lab diagrams. Please see [employer] [facility] for the authenticated document.

## 2021-05-28 NOTE — PROGRESS NOTES
Patient: YESSY HARPER     Acct: [id number]     Report: #[id number]  UNIT #: [id number]     : 1960    Encounter Date:2020  PRIMARY CARE: NEY HERRMANN  ***Signed***  --------------------------------------------------------------------------------------------------------------------  History of Present Illness            Chief Complaint: 1Y F/U  Chest CT results, Hypertension, Dyspnea, Pulm. Nodule             Yessy Harper is presenting for evaluation via Telehealth visit. Verbal     consent obtained before beginning visit.            PAST MEDICAL HISTORY/OVERVIEW OF PATIENT SYMPTOMS            Symptoms: Wheezing at night             Any known Exposure to COVID-19: No             Never Smoker             Provider spent 16 minutes with the patient during telehealth visit.            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 Bryant.  During his last visit, the patient was educated on how to     appropriately take his Symbicort.  The patient states that he is now compliant     with his Symbicort two puffs twice a day. He does state that he struggles with     some nighttime wheezing, states that he does well during the day, however once     he lays down to go to bed he starts feeling like he wheezes. He denies any acid     reflux or coughing after he eats. He states that he does not use his rescue     inhaler.  He also feels short of breath with significant physical exertion such     as lifting heavy boxes or carrying something a long distance.  He states that he    fatigues usually.  He denies any shortness of breath with walking on flat     ground.  He is able to ambulate up several flights of stairs easily.  He states     he is able to perform his ADLs  without difficulty. He does have a chronic cough     that is unchanged from his baseline. He denies any fever, chills, chest pain or     hemoptysis. He has no known sick contacts.            Review of systems:  10/14/ review of systems obtained and negative unless     otherwise stated in the HPI.              Physical exam deferred due to TeleHealth visit.              I reviewed Dr. Bryant's note from 2019.  I also reviewed chest CT from     2020.                 [facility]                PACS RADIOLOGY REPORT            Patient: YESSY GUPTA   Acct: #[id number]   Report: #[id number]            UNIT #: [id number]    DOS: 20 1022      INSURANCE:[employer] - O   ORDER #:[id number]      LOCATION:[facility]     : 1960            PROVIDERS      ADMITTING:     ATTENDING: JUS BRYANT      FAMILY:  NONE,MD   ORDERING:  JUS BRYANT         OTHER:    DICTATING:  Osmel Solomon MD            REQ #:[id number]   [facility]      REASON FOR EXAM:  PULMONARY NODULE      REASON FOR VISIT:  PULMONARY NODULE            *******Signed******         PROCEDURE:   CT CHEST WITHOUT CONTRAST             COMPARISON:   [facility], CT, CHEST W/O CONTRAST, 2019,    14:46.             INDICATIONS:   PULMONARY NODULE             TECHNIQUE:   CT images were created without the administration of contrast     material.               PROTOCOL:     Standard imaging protocol performed                RADIATION:     DLP: 559mGy*cm          Automated exposure control was utilized to minimize radiation dose.              FINDINGS:         CT of the chest without contrast is compared to previous CT of the chest without    contrast formed on       2019.  Today's study reveals no evidence mass or adenopathy in the base     the neck or in the       periclavicular soft tissues or in the  axillary soft tissues.  No evidence of     mass or adenopathy in       the mediastinum or in the right or left pulmonary bryant.  The heart size.  No     evidence of       pericardial effusion or pericardial thickening.  No evidence of hiatal hernia.      No acute disease in       the superior aspect of the abdomen.  There several small calcified stones in the    right left       kidneys.  Largest stone in the right kidney measures 4.6 mm in size.  Largest     stone in the left       kidney measures 2 mm in size.  No evidence of mass or adenopathy or ascites in     the abdomen.  There       is mild calcified atherosclerotic plaque in the right left coronary arteries.      There are small       infiltrates and mild pleural thickening in the lung apices bilaterally unchanged    significantly       since previous study consistent with acute and chronic inflammation and     infection and scarring.        There are bilateral diffuse increased markings consistent with chronic lung     disease.  There is a       small focal mild infiltrate in the left lower lobe lung there is a small focal     infiltrate and mild       focal pleural thickening in the posterior medial aspect of the right lower lobe     lung consistent       with acute and chronic inflammation and infection and scarring.  No evidence of     pleural effusion or       pneumothorax or mass or metastatic disease in the lungs.             CONCLUSION:         1. Chronic lung disease.      2. Small focal mild infiltrates and pleural thickening in the lung apices     bilaterally unchanged       consistent with acute and chronic inflammation infection scarring.      3. Small focal infiltrate and mild pleural thickening posterior medial aspect     right lower lobe lung       more prominent than on previous CT of the chest consistent with acute and     chronic inflammation       infection and scarring.      4. Small focal infiltrate left lower lobe lung unchanged  significantly since     previous study              JENELLE ASCENCIO MD             Electronically Signed and Approved By: JENELLE ASCENCIO MD on 12/18/2020 at     11:51                               Until signed, this is an unconfirmed preliminary report that may contain      errors and is subject to change.                                              GIUSEPPEKE:      D:12/18/20 1151                         Allergies and Medications      Allergies:        Coded Allergies:             PENICILLINS (Verified  Allergy, Mild, 12/23/20)                  rash      Medications    Last Reconciled on 12/23/20 14:07 by ANETA BLOUNT      Saccharomyces Boulardii (Florastor) 250 Mg Capsule      250 MG PO Q12HR, CAP         Reported         12/23/20       Budesonide/Formoterol Fumarate (Symbicort 160/4.5 Mcg) 10.2 Gm Inh      2 PUFF INH BID for 30 Days, #1 INH 4 Refills         Prov: JUS ROBERTS         10/1/20       Tamsulosin HCL (Tamsulosin HCL) 0.4 Mg Capsule      0.4 MG PO HS, #30 CAP 0 Refills         Reported         8/6/20       Budesonide/Formoterol Fumarate (Symbicort 160/4.5 Mcg) 10.2 Gm Inh               Prov: JUS ROBERTS         12/18/19       MDI-Albuterol (Ventolin HFA) 18 Gm Hfa.aer.ad      2 PUFFS INH Q4-6H PRN for SHORTNESS OF BREATH, #1 MDI 4 Refills         Prov: JUS ROBERTS         12/18/19       Atorvastatin (Atorvastatin) 40 Mg Tablet      40 MG PO HS, #30 TAB 0 Refills         Reported         10/3/17            Assessment      Shortness of Air  R06.02            Plan      Orders:  Phone Eval 11-20 mi 11584      Instructions      * Chronic conditions reviewed and taken in consideration for today's treatment       plan.      * Plan Of Care: ()      * Patient instructed to seek medical attention urgently for new or worsening       symptoms.      * Patient was educated/instructed on their diagnosis, treatment and medications       today.      * Recommend self monitoring. Instructions given.       * Recommend self quarantine for 14 days.      * Recommend self quarantine until without fever for 72 hours without using fever       reducing medications.      * Recommends over the counter medications for symptom management.            ASSESSMENT:       1. Chronic eosinophilic pneumonia, stable.      2. Lipoid pneumonia.      3. Chronic dyspnea.      4. Chronic wheezing.      5. Never smoker.            PLAN:      1. Continue with Symbicort two puffs twice a day.  Educated patient that he is     to use his rescue albuterol inhaler when he starts feeling like he is wheezing     or short of breath.      2. Continue with Singulair.      3.  We will obtain repeat CT scan of the chest of the patient's chest in one     year.  The patient had a CT scan of the chest last week, the results were     reviewed with the patient. It did show chronic lung disease and small focal     infiltrates that were concerning for acute/chronic inflammation and scarring.      Overall his CT scan of the chest was unchanged from his previous study.      4. The patient last had PFTs in 2017.  I reviewed these results. We will obtain     a repeat set of PFTs and a six minute walk test.  Educated the patient on the     need for these studies.      5. Advised patient to continue to maintain social distancing protocols and to     wear his mask when he goes out in public.        6. He is up-to-date on his flu shot.      7. Advised patient to call our office with any worsening shortness of breath,     cough, fever or chills. Advised him to go to the ED for any severe respiratory     distress.      8. Follow up with Dr. Bryant in clinic in six months.            Electronically signed by ANETA BLOUNT  01/07/2021 12:57  Electronically signed by Seferino Quintanilla  01/20/2021 14:30       Disclaimer: Converted document may not contain table formatting or lab diagrams. Please see [employer] for the authenticated document.

## 2021-05-28 NOTE — PROGRESS NOTES
Patient: YESSY GUPTA     Acct: [id number]     Report: #[id number]  UNIT #: [id number]     : 1960    Encounter Date:2020  PRIMARY CARE: NEY HERRMANN  ***Signed***  --------------------------------------------------------------------------------------------------------------------  NURSE INTAKE      Visit Type      Established Patient Visit            Chief Complaint      SHAHEEN            Referring Provider/Copies To      Primary Care Provider:  NEY HERRMANN      Copies To:   NEY HERRMANN            History and Present Illness      Past History      Mr. Gupta is a well known patient to [facility] for previous evaluation of     thrombocytopenia which resolved.  Most recently found to have iron deficiency.      In 2018 Iron was 43  TIBC 347 and Percent saturation 12.  H/H was 14.9 /    43.2 MCV 89.      He underwent EGD and colonoscopy 10/26/2018.  Colonoscopy with 2 polyps and a     cecal AVM was noted and bleeding this was cauterized. EGD revealed a hiatal     hernia.              In 2018 Iron was 59  TIBC 405 and Percent saturation 19 with H/H     13.9/40.4 and Platelet count 150,000.  This was repeated again in 2019     with Iron 50 TIBC 383 Percent sat 15  H/H 14.8/42 and Platelet count 157,000.      He started on oral iron 2 months ago.  Denies hematemesis, melena and BRB with     BMs.  He denies fatigue and pagophagia.            HPI - Hematology Interim      1) Iron deficiency anemia:             Mr. Yessy Gupta presents for 4 month follow up for iron deficiency anemia. He     reports he is doing well in the interim. He denies any increasing fatigue. He     reports he does have intermittent shortness of breath with activity. He follows     with pulmonary for this. He has several inhalers he uses which do help with the     shortness of breath. He denies any GI bleeding or weight loss. His last     hemoglobin dated 20 is normal with level of  14.3.             His last Injectafer infusions were in October of 2019.             2) Elevated PSA: History of elevated PSA. He is following with urology, Dr. Hightower. He has follow up with her in December to discuss prostate biopsy. His     last PSA was 8.12 in June 2020 down from 9.20 in March. He is taking Flomax for     this. He reports his urine stream has improved. He denies any cough, bone or     joint pain or abdominal pain.            Most Recent Lab Findings      Laboratory Tests      7/29/20 15:40            PAST, FAMILY   Past Medical History      Past Medical History:  Hypertension      Other PMH:        cecal AVM      Hematology/Oncology (M):  Anemia            Past Surgical History      Lung Biopsy (RIGHT)            Family History      Family History:  Lymphoma (FATHER)            Social History      Marital Status:  Single      Lives independently:  Yes      Number of Children:  0      Occupation:  WORKING            Tobacco Use      Tobacco status:  Never smoker      Currently Vaping:  No            Alcohol Use      Alcohol intake:  None            Substance Use      Substance use:  Denies use            REVIEW OF SYSTEMS      General:  Denies: Appetite Change, Fatigue, Fever, Night Sweats, Weight Gain,     Weight Loss      Eye:  Denies Blurred Vision, Denies Corrective Lenses, Denies Diplopia, Denies     Vision Changes      ENT:  Denies Headache, Denies Hearing Loss, Denies Hoarseness, Denies Sore     Throat      Cardiovascular:  Denies Chest Pain, Denies Palpitations      Respiratory:  Admits: Shortness of Air;          Denies: Cough, Coughing Blood, Productive Cough, Wheezing      Gastrointestinal:  Denies Bloody Stools, Denies Constipation, Denies Diarrhea,     Denies Nausea/Vomiting, Denies Problem Swallowing, Denies Unable to Control     Bowels      Genitourinary:  Denies Blood in Urine, Denies Incontinence, Denies Painful     Urination      Musculoskeletal:  Denies Back Pain, Denies  Muscle Pain, Denies Painful Joints      Integumentary:  Denies Itching, Denies Lesions, Denies Rash      Neurologic:  Denies Dizziness, Denies Numbness\Tingling, Denies Seizures      Psychiatric:  Denies Anxiety, Denies Depression      Endocrine:  Denies Cold Intolerance, Denies Heat Intolerance      Hematologic/Lymphatic:  Denies Bruising, Denies Bleeding, Denies Enlarged Lymph     Nodes            VITAL SIGNS AND SCORES      Vitals      Weight 166 lbs 14.212 oz / 75.7 kg      Temperature 98.0 F / 36.67 C - Temporal      Pulse 67      Respirations 18      Blood Pressure 117/58 Sitting, Left Arm      Pulse Oximetry 98%, ROOM AIR            Pain Score      Experiencing any pain?:  No      Pain Scale Used:  Numerical      Pain Intensity:  0            Fatigue Score      Experiencing any fatigue?:  No      Fatigue (0-10 scale):  0 (none)            EXAM      General appearance:  in no apparent distress, cooperative, appears stated age.      HEENT: No pallor, no icterus, oral mucosa moist      Neck: Supple, trachea central-not deviated      Lymph nodes: none palpable peripherally      Cardiovascular: S1-S2 heard, no murmurs, no rubs, no gallops.      Breast exam:      Respiratory: Clear to auscultation bilaterally, no adventitious sounds      Abdomen/gastro: Soft, nontender, no palpable hepatosplenomegaly, bowel sounds he    mignon      Skin: No lesions, no rashes, no petechiae.      Extremities: No pedal edema, peripheral pulses felt, no clubbing      Musculoskeletal: Normal tone, no rigidity of muscles; range of motion intact      Spine: No deformities      Psychiatric: Alert and oriented x3, appropriate affect, intact judgment      Neurologic: Cranial nerves II through XII grossly intact, no gross neurological     deficits noted.            PREVENTION      Hx Influenza Vaccination:  Yes      Date Influenza Vaccine Given:  Sep 1, 2019      Influenza Vaccine Declined:  No      2 or More Falls in Past Year?:  No      Fall  Past Year with Injury?:  No      Hx Pneumococcal Vaccination:  No      Encouraged to follow-up with:  PCP regarding preventative exams.      Chart initiated by      DANNY VALERA [facility]            ALLERGY/MEDS      Allergies      Coded Allergies:             PENICILLINS (Verified  Allergy, Mild, 8/6/20)                  rash            Medications      Last Reconciled on 8/6/20 14:24 by ARABELLA LANDERS      Tamsulosin HCL (Tamsulosin HCL) 0.4 Mg Capsule      0.4 MG PO HS, #30 CAP 0 Refills         Reported         8/6/20       Budesonide/Formoterol Fumarate (Symbicort 160/4.5 Mcg) 10.2 Gm Inh      2 PUFF INH BID for 30 Days, #1 INH 6 Refills         Prov: ARMANDOJUS         7/13/20       Budesonide/Formoterol Fumarate (Symbicort 160/4.5 Mcg) 10.2 Gm Inh               Prov: JUS ROBERTS         12/18/19       MDI-Albuterol (Ventolin HFA) 18 Gm Hfa.aer.ad      2 PUFFS INH Q4-6H PRN for SHORTNESS OF BREATH, #1 MDI 4 Refills         Prov: ARMANDOJUS         12/18/19       predniSONE (Deltasone) 10 Mg Tablet      10 MG PO ASDIR, #150 TAB 0 Refills         Prov: JUS ROBERTS         4/19/19       Atorvastatin (Atorvastatin) 40 Mg Tablet      40 MG PO HS, #30 TAB 0 Refills         Reported         10/3/17      Medications Reviewed:  No Changes made to meds            IMPRESSION/PLAN      Impression      1) Iron deficiency anemia:             Mr. Andry Harper presents for 4 month follow up for iron deficiency anemia. He     reports he is doing well in the interim. He denies any increasing fatigue. He     reports he does have intermittent shortness of breath with activity. He follows     with pulmonary for this. He has several inhalers he uses which do help with the     shortness of breath. He denies any GI bleeding or weight loss. His last     hemoglobin dated 7/29/20 is normal with level of 14.3.             His last Injectafer infusions were in October of 2019. He currently is not     taking any oral iron therapy.              2) Elevated PSA: History of elevated PSA. He is following with urology, Dr. Hightower. He has follow up with her in December to discuss prostate biopsy. His     last PSA was 8.12 in June 2020 down from 9.20 in March. He is taking Flomax for     this. He reports his urine stream has improved. He denies any cough, bone or     joint pain or abdominal pain.            Diagnosis      Iron deficiency - E61.1            Notes      New Medications      * Tamsulosin HCL 0.4 MG CAPSULE: 0.4 MG PO HS #30      New Diagnostics      * CBC With Auto Diff, 6 Months         Dx: Iron deficiency - E61.1      * CMP Comp Metabolic Panel, 6 Months         Dx: Iron deficiency - E61.1      * Iron Profile, 6 Months         Dx: Iron deficiency - E61.1      * Ferritin Level, 6 Months         Dx: Iron deficiency - E61.1      * B12      Dx: Iron deficiency - E61.1            Plan      1) We will see him back in 6 months since his labs are stable with labs prior     with CBC, CMP, iron panel, ferritin, folate and B-12.             2) He will continue close follow up with urology, Dr. Hightower.             I instructed patient should he have any worsening fatigue, craving ice, or any     GI bleeding, to call us immediately so we can get him to check labs sooner than     6 months.            Pain      Pain Zero Today            Advanced Care Plan Discussion      Declines Discussion [id number]            Patient Education            DI for Iron Deficiency Anemia-Adult      Patient Education Provided:  Yes            Electronically signed by ARABELLA LANDERS onc  08/06/2020 14:25       Disclaimer: Converted document may not contain table formatting or lab diagrams. Please see [employer] for the authenticated document.

## 2021-06-09 DIAGNOSIS — N40.1 BPH WITH OBSTRUCTION/LOWER URINARY TRACT SYMPTOMS: Primary | ICD-10-CM

## 2021-06-09 DIAGNOSIS — N13.8 BPH WITH OBSTRUCTION/LOWER URINARY TRACT SYMPTOMS: Primary | ICD-10-CM

## 2021-06-09 RX ORDER — TAMSULOSIN HYDROCHLORIDE 0.4 MG/1
1 CAPSULE ORAL DAILY
Qty: 90 CAPSULE | Refills: 3 | Status: SHIPPED | OUTPATIENT
Start: 2021-06-09 | End: 2021-09-07

## 2021-06-14 ENCOUNTER — LAB (OUTPATIENT)
Dept: LAB | Facility: HOSPITAL | Age: 61
End: 2021-06-14

## 2021-06-14 ENCOUNTER — TRANSCRIBE ORDERS (OUTPATIENT)
Dept: ADMINISTRATIVE | Facility: HOSPITAL | Age: 61
End: 2021-06-14

## 2021-06-14 DIAGNOSIS — R97.20 ELEVATED PROSTATE SPECIFIC ANTIGEN (PSA): Primary | ICD-10-CM

## 2021-06-14 DIAGNOSIS — R05.9 COUGH: ICD-10-CM

## 2021-06-14 DIAGNOSIS — R07.9 INTERMITTENT CHEST PAIN: ICD-10-CM

## 2021-06-14 DIAGNOSIS — R06.00 DYSPNEA, UNSPECIFIED TYPE: ICD-10-CM

## 2021-06-14 DIAGNOSIS — R97.20 ELEVATED PROSTATE SPECIFIC ANTIGEN (PSA): ICD-10-CM

## 2021-06-14 LAB — PSA SERPL-MCNC: 10.3 NG/ML (ref 0–4)

## 2021-06-14 PROCEDURE — 80053 COMPREHEN METABOLIC PANEL: CPT

## 2021-06-14 PROCEDURE — 84153 ASSAY OF PSA TOTAL: CPT

## 2021-06-14 PROCEDURE — 36415 COLL VENOUS BLD VENIPUNCTURE: CPT

## 2021-06-15 ENCOUNTER — OFFICE VISIT (OUTPATIENT)
Dept: PULMONOLOGY | Facility: CLINIC | Age: 61
End: 2021-06-15

## 2021-06-15 VITALS
SYSTOLIC BLOOD PRESSURE: 104 MMHG | TEMPERATURE: 97.5 F | OXYGEN SATURATION: 100 % | RESPIRATION RATE: 16 BRPM | WEIGHT: 168 LBS | HEIGHT: 60 IN | DIASTOLIC BLOOD PRESSURE: 56 MMHG | HEART RATE: 69 BPM | BODY MASS INDEX: 32.98 KG/M2

## 2021-06-15 DIAGNOSIS — R06.00 DYSPNEA, UNSPECIFIED TYPE: ICD-10-CM

## 2021-06-15 DIAGNOSIS — R05.9 COUGH: Primary | ICD-10-CM

## 2021-06-15 DIAGNOSIS — R07.9 INTERMITTENT CHEST PAIN: ICD-10-CM

## 2021-06-15 DIAGNOSIS — D69.6 THROMBOCYTOPENIA (HCC): ICD-10-CM

## 2021-06-15 DIAGNOSIS — J69.1 LIPOID PNEUMONIA (HCC): ICD-10-CM

## 2021-06-15 DIAGNOSIS — D50.9 IRON DEFICIENCY ANEMIA, UNSPECIFIED IRON DEFICIENCY ANEMIA TYPE: ICD-10-CM

## 2021-06-15 DIAGNOSIS — R06.2 WHEEZING: ICD-10-CM

## 2021-06-15 DIAGNOSIS — R06.09 CHRONIC DYSPNEA: ICD-10-CM

## 2021-06-15 DIAGNOSIS — J82.81 CHRONIC EOSINOPHILIC PNEUMONIA (HCC): ICD-10-CM

## 2021-06-15 LAB
ALBUMIN SERPL-MCNC: 4 G/DL (ref 3.5–5.2)
ALBUMIN/GLOB SERPL: 1.5 G/DL
ALP SERPL-CCNC: 91 U/L (ref 39–117)
ALT SERPL W P-5'-P-CCNC: 19 U/L (ref 1–41)
ANION GAP SERPL CALCULATED.3IONS-SCNC: 11.8 MMOL/L (ref 5–15)
AST SERPL-CCNC: 21 U/L (ref 1–40)
BILIRUB SERPL-MCNC: 0.4 MG/DL (ref 0–1.2)
BUN SERPL-MCNC: 12 MG/DL (ref 8–23)
BUN/CREAT SERPL: 11.9 (ref 7–25)
CALCIUM SPEC-SCNC: 9.3 MG/DL (ref 8.6–10.5)
CHLORIDE SERPL-SCNC: 103 MMOL/L (ref 98–107)
CO2 SERPL-SCNC: 25.2 MMOL/L (ref 22–29)
CREAT SERPL-MCNC: 1.01 MG/DL (ref 0.76–1.27)
EXHALED NITROUS OXIDE: 0
GFR SERPL CREATININE-BSD FRML MDRD: 75 ML/MIN/1.73
GLOBULIN UR ELPH-MCNC: 2.6 GM/DL
GLUCOSE SERPL-MCNC: 90 MG/DL (ref 65–99)
POTASSIUM SERPL-SCNC: 4.3 MMOL/L (ref 3.5–5.2)
PROT SERPL-MCNC: 6.6 G/DL (ref 6–8.5)
SODIUM SERPL-SCNC: 140 MMOL/L (ref 136–145)

## 2021-06-15 PROCEDURE — 99214 OFFICE O/P EST MOD 30 MIN: CPT | Performed by: NURSE PRACTITIONER

## 2021-06-15 RX ORDER — ATORVASTATIN CALCIUM 20 MG/1
TABLET, FILM COATED ORAL
COMMUNITY
End: 2021-08-27 | Stop reason: SDUPTHER

## 2021-06-15 RX ORDER — ATORVASTATIN CALCIUM 40 MG/1
TABLET, FILM COATED ORAL
COMMUNITY
Start: 2021-05-27 | End: 2021-07-10

## 2021-06-15 RX ORDER — ALBUTEROL SULFATE 90 UG/1
AEROSOL, METERED RESPIRATORY (INHALATION)
COMMUNITY

## 2021-06-15 RX ORDER — PREDNISONE 1 MG/1
0.5 TABLET ORAL
COMMUNITY
End: 2021-06-15

## 2021-06-15 RX ORDER — BUDESONIDE AND FORMOTEROL FUMARATE DIHYDRATE 160; 4.5 UG/1; UG/1
AEROSOL RESPIRATORY (INHALATION)
COMMUNITY
End: 2021-07-29

## 2021-06-15 NOTE — PATIENT INSTRUCTIONS
Nonspecific Chest Pain  Chest pain can be caused by many different conditions. Some causes of chest pain can be life-threatening. These will require treatment right away. Serious causes of chest pain include:  · Heart attack.  · A tear in the body's main blood vessel.  · Redness and swelling (inflammation) around your heart.  · Blood clot in your lungs.  Other causes of chest pain may not be so serious. These include:  · Heartburn.  · Anxiety or stress.  · Damage to bones or muscles in your chest.  · Lung infections.  Chest pain can feel like:  · Pain or discomfort in your chest.  · Crushing, pressure, aching, or squeezing pain.  · Burning or tingling.  · Dull or sharp pain that is worse when you move, cough, or take a deep breath.  · Pain or discomfort that is also felt in your back, neck, jaw, shoulder, or arm, or pain that spreads to any of these areas.  It is hard to know whether your pain is caused by something that is serious or something that is not so serious. So it is important to see your doctor right away if you have chest pain.  Follow these instructions at home:  Medicines  · Take over-the-counter and prescription medicines only as told by your doctor.  · If you were prescribed an antibiotic medicine, take it as told by your doctor. Do not stop taking the antibiotic even if you start to feel better.  Lifestyle    · Rest as told by your doctor.  · Do not use any products that contain nicotine or tobacco, such as cigarettes, e-cigarettes, and chewing tobacco. If you need help quitting, ask your doctor.  · Do not drink alcohol.  · Make lifestyle changes as told by your doctor. These may include:  ? Getting regular exercise. Ask your doctor what activities are safe for you.  ? Eating a heart-healthy diet. A diet and nutrition specialist (dietitian) can help you to learn healthy eating options.  ? Staying at a healthy weight.  ? Treating diabetes or high blood pressure, if needed.  ? Lowering your stress.  Activities such as yoga and relaxation techniques can help.  General instructions  · Pay attention to any changes in your symptoms. Tell your doctor about them or any new symptoms.  · Avoid any activities that cause chest pain.  · Keep all follow-up visits as told by your doctor. This is important. You may need more testing if your chest pain does not go away.  Contact a doctor if:  · Your chest pain does not go away.  · You feel depressed.  · You have a fever.  Get help right away if:  · Your chest pain is worse.  · You have a cough that gets worse, or you cough up blood.  · You have very bad (severe) pain in your belly (abdomen).  · You pass out (faint).  · You have either of these for no clear reason:  ? Sudden chest discomfort.  ? Sudden discomfort in your arms, back, neck, or jaw.  · You have shortness of breath at any time.  · You suddenly start to sweat, or your skin gets clammy.  · You feel sick to your stomach (nauseous).  · You throw up (vomit).  · You suddenly feel lightheaded or dizzy.  · You feel very weak or tired.  · Your heart starts to beat fast, or it feels like it is skipping beats.  These symptoms may be an emergency. Do not wait to see if the symptoms will go away. Get medical help right away. Call your local emergency services (911 in the [place] Do not drive yourself to the hospital.  Summary  · Chest pain can be caused by many different conditions. The cause may be serious and need treatment right away. If you have chest pain, see your doctor right away.  · Follow your doctor's instructions for taking medicines and making lifestyle changes.  · Keep all follow-up visits as told by your doctor. This includes visits for any further testing if your chest pain does not go away.  · Be sure to know the signs that show that your condition has become worse. Get help right away if you have these symptoms.  This information is not intended to replace advice given to you by your health care provider. Make  sure you discuss any questions you have with your health care provider.  Document Revised: 06/20/2019 Document Reviewed: 06/20/2019  Elsevier Patient Education © 2021 [employer]

## 2021-06-15 NOTE — PROGRESS NOTES
Primary Care Provider  Keenan Craft MD     Referring Provider  No ref. provider found     Chief Complaint  Follow-up and COPD            History of Presenting Illness  Patient is a 61-year-old  male, patient of Dr. Graham who is a never smoker who has a history of chronic eosinophilic pneumonia and lipoid pneumonia presents for follow-up visit today.  Since last office visit patient did have a pulmonary function test that was completed back in March 2021.  Pulmonary function test showed mild restrictive lung defect with no bronchodilator response noted.  It also showed mild decreased diffusion capacity.  Patient states that he still does get short of breath that is worse with exertion, moderate severity, improved with rest.  Patient states at times he also has intermittent chest pain.  Patient states when he exerts himself he will feel pressure in the center of his chest at times with no radiation.  Patient states that it subsides when he sits down and rest.  No chest pain in the office today.  Patient states that there is significant cardiac history in his family.  Patient states that he also has a younger brother has lung cancer and had a blood clot.  Patient states he has had in fact 2 brothers have had blood clots along with his father.  Patient states that he has a Symbicort inhaler however he usually only takes it once a day and misses the nighttime dose.  Patient states he has an albuterol inhaler that he uses as needed.  Patient states at times his cough and wheezing are worse at night.  Patient denies any reflux.  Patient states at times he does have a productive cough anywhere from clear to yellow to green sputum.  Patient states that he does have allergies however he is not taking any allergy medication.  Patient states at times he does have postnasal drip that does make him cough to when he lays down.  Patient denies fever, chills, night sweats, swollen glands in the head and neck,  hemoptysis, unintentional weight loss, dysphagia, chest pain, chest tightness, palpitations, abdominal pain, nausea, vomiting, and diarrhea.  Patient also denies any myalgias, changes in sense of taste and/or smell, sore throat, any other coronavirus flulike symptoms.  Patient denies any leg swelling, orthopnea, paroxysmal nocturnal dyspnea.  Patient denies any history of any bleeding or blood clotting disorders.  Patient denies any recent travel.  Patient denies any exposure to any ill contacts.  Patient states he is not taking any hormone replacement therapy.  Patient states he is currently being worked up for an elevated PSA level by his primary care provider.  Patient states he also sees hematologist for anemia.  Patient states he is a form his activities of daily living.    Review of Systems   Constitutional: Negative for activity change, appetite change, chills, diaphoresis, fatigue, fever, unexpected weight gain and unexpected weight loss.        Negative for Insomnia   HENT: Positive for congestion (Nasal) and postnasal drip. Negative for mouth sores, nosebleeds, sore throat, swollen glands and trouble swallowing.         Negative for Thrush  Negative for Hoarseness  Negative for Allergies/Hay Fever  Negative for Recent Head injury  Negative for Ear Fullness  Negative for Nasal or Sinus pain  Negative for Dry lips  Negative for Nasal discharge   Respiratory: Positive for cough, shortness of breath and wheezing. Negative for apnea and chest tightness.         Negative for Hemoptysis  Negative for Pleuritic pain   Cardiovascular: Negative for chest pain, palpitations and leg swelling.        Negative for Claudication  Negative for Cyanosis  Negative for Dyspnea on exertion   Gastrointestinal: Negative for abdominal pain, diarrhea, nausea, vomiting and GERD.   Musculoskeletal: Negative for joint swelling and myalgias.        Negative for Joint pain  Negative for Joint stiffness   Skin: Negative for color  change, dry skin, pallor and rash.   Neurological: Negative for syncope, weakness and headache.   Hematological: Negative for adenopathy. Does not bruise/bleed easily.          Family History   Problem Relation Age of Onset   • Lymphoma Father         Social History     Socioeconomic History   • Marital status: Single     Spouse name: Not on file   • Number of children: Not on file   • Years of education: Not on file   • Highest education level: Not on file   Tobacco Use   • Smoking status: Never Smoker   • Smokeless tobacco: Never Used   Vaping Use   • Vaping Use: Never used   Substance and Sexual Activity   • Alcohol use: Not Currently   • Drug use: Never        Past Medical History:   Diagnosis Date   • Anemia    • AVM (arteriovenous malformation)     cecal AVM   • Hypertension         Immunization History   Administered Date(s) Administered   • COVID-19 (PFIZER) 04/05/2021, 04/26/2021   • Flu Vaccine Intradermal Quad 18-64YR 10/07/2020   • Flu Vaccine Quad PF >36MO 10/01/2018, 09/30/2019   • Influenza, Unspecified 09/01/2019       Allergies   Allergen Reactions   • Penicillins Unknown - Low Severity          Current Outpatient Medications:   •  albuterol sulfate HFA (Ventolin HFA) 108 (90 Base) MCG/ACT inhaler, Ventolin HFA 90 mcg/actuation inhalation HFA aerosol inhaler inhale 1 puff (90 mcg) by inhalation route every 4-6 hours as needed   Active, Disp: , Rfl:   •  atorvastatin (LIPITOR) 20 MG tablet, atorvastatin 20 mg oral tablet take 1 tablet (20 mg) by oral route once daily   Active, Disp: , Rfl:   •  atorvastatin (LIPITOR) 40 MG tablet, , Disp: , Rfl:   •  budesonide-formoterol (Symbicort) 160-4.5 MCG/ACT inhaler, Symbicort 160-4.5 mcg/actuation inhalation HFA aerosol inhaler inhale 2 puffs by inhalation route 2 times per day in the morning and evening   Active, Disp: , Rfl:   •  tamsulosin (FLOMAX) 0.4 MG capsule 24 hr capsule, Take 1 capsule by mouth Daily for 90 days., Disp: 90 capsule, Rfl: 3  "        Physical Exam  Vital Signs Reviewed  WDWN, Alert, NAD.    HEENT:  PERRL, EOMI.  OP, nares clear, no sinus tenderness  Neck:  Supple, no JVD, no thyromegaly  Lymph: no axillary, cervical, supraclavicular lymphadenopathy noted bilaterally  Chest: mildly decreased breath sounds throughout. No wheezes, rales, or rhonchi appreciated.  Normal work of breathing noted.  Patient is able speak full sentences without difficulty.  CV: RRR, no MGR, pulses 2+, equal.  Abd:  Soft, NT, ND, + BS, no HSM  EXT:  no clubbing, no cyanosis, no edema, no joint tenderness  Neuro:  A&Ox3, CN grossly intact, no focal deficits.  Skin: No rashes or lesions noted.      Vital Signs:   /56 (BP Location: Right arm, Patient Position: Sitting, Cuff Size: Adult)   Pulse 69   Temp 97.5 °F (36.4 °C) (Oral)   Resp 16   Ht (!) 5.1 cm (2.01\")   Wt 76.2 kg (168 lb)   SpO2 100% Comment: room air  BMI 37815.04 kg/m²         Result Review :   I have personally reviewed EBONY Engle's last office visit note.       Assessment and Plan      Assessment:  1. Chronic eosinophilic pneumonia, stable.      2. Lipoid pneumonia.      3. Chronic dyspnea.      4. Chronic wheezing.      5. Never smoker.   6.  Iron deficiency anemia/thrombocytopenia patient is under the care of MIKE Lyon   7.  Intermittent chest pain.  No chest pain in the office today per patient report.  8.  Seasonal allergies/allergic rhinitis.      Plan:  1. Continue with Symbicort two puffs twice a day and rinse mouth out after each use.  Patient reports he is only taking Symbicort usually once a day.  Patient is advised to take Symbicort 2 puffs twice a day as prescribed.  Risks of not taking medications as prescribed discussed with patient and patient verbalized understanding compliance.  2.  Continue with albuterol inhaler as needed.  Patient is advised if he has to use his albuterol inhaler 2-3 times week to notify our office when used above therapy.    3. " Continue with Singulair every day prescribed.    Recommend patient to start over-the-counter Zyrtec 10 mg once daily.  Offered to send over prescription however patient states he will buy medication over-the-counter himself.   4.    Patient complaining of intermittent chest pain and shortness of air.  No chest pain in the office today.  I will order chest CT with contrast, CBC, CMP, EKG, magnesium level.  5.  I will refer patient to cardiologist.  6.  Patient reports productive cough and wheezing at times.  I will order a sputum culture and IgE level.       7.  Patient not able to complete feno testing in the office today.  Multiple attempts are made.  Not able to evaluate for eosinophilic airway inflammation.  8.  Patient to call the office, 911, or go to the ER with new or worsening symptoms.     9.  Patient reports he is up-to-date with his flu and Covid vaccines.  Patient states he will check on the status of his pneumonia vaccine and notify our office.  Patient is advised to continue to follow CDC recommendations such as social distancing, wearing a mask, and washing hands for least 20 seconds.  10.  Patient to follow-up with hematology as scheduled for iron deficiency anemia/thrombocytopenia.     8. Follow up with Dr. Bryant in 6 to 8 weeks, sooner if needed.    Follow Up   Return in about 1 week (around 6/22/2021), or if symptoms worsen or fail to improve, for Recheck. Patient to follow up with Dr. Bryant in 6-8 weeks, sooner if needed. .  Patient was given instructions and counseling regarding his condition or for health maintenance advice. Please see specific information pulled into the AVS if appropriate.

## 2021-06-21 ENCOUNTER — OFFICE VISIT (OUTPATIENT)
Dept: UROLOGY | Facility: CLINIC | Age: 61
End: 2021-06-21

## 2021-06-21 VITALS
HEIGHT: 71 IN | DIASTOLIC BLOOD PRESSURE: 60 MMHG | WEIGHT: 164 LBS | SYSTOLIC BLOOD PRESSURE: 108 MMHG | BODY MASS INDEX: 22.96 KG/M2

## 2021-06-21 DIAGNOSIS — R97.20 ELEVATED PROSTATE SPECIFIC ANTIGEN (PSA): Primary | ICD-10-CM

## 2021-06-21 PROCEDURE — 99212 OFFICE O/P EST SF 10 MIN: CPT | Performed by: UROLOGY

## 2021-06-21 NOTE — ASSESSMENT & PLAN NOTE
Repeat PSA in 6 months. We may consider another prostate MRI at that time if it is elevated even more.  His PSA typically runs in the 6 to 10 range.

## 2021-06-21 NOTE — PROGRESS NOTES
Chief Complaint  Elevated PSA    Subjective          Andry Harper presents to [facility]  Patient is a 62 yo male. He presents for results of TRUS bx for elevated PSA. The patient and I discussed the results which showed no evidence of cancer. I discussed the possibility of a false negative. He expressed understanding. He has done well since the biopsy.    6/4/19 - Patient presents in follow up. He is doing well. He denies bothersome urinary issues. He has not had a recent PSA.    7/22/19 - Patient presents in follow up. He is doing well. He has no urinary issues. He had an MRI of the prostate and this showed no areas suggestive of clinically significant prostate cancer. The volume of the gland was 77ml. The patient did have a recent PSA and this was 12. This was increased from 6 at the time of his biopsy. I have recommended following this.    12/18/2019: Patient is here for follow-up of his elevated PSA.  It was recently checked and is found to be 7.5 at this point.  That is down from 12.  It was at 6 at the time of his prostate biopsy which was negative.  He is also had an MRI of his prostate which showed no areas suggestive of clinically significant prostate cancer.    6/10/20:  He is having more issues with frequency and urgency.  He states he has a slow stream at times.  He would like to try medicine for that.  His most recent PSA was 8 which is down from a high of 12 but up from last check at 6.  He had a negative prostate MRI but that was over a year ago.      12/9/20:  His most recent PSA in Nov 2020 is now 7.71 which is down for him.  He had a negative prostate MRI in 2019 and again in June 2020.  He had a negative prostate biopsy in the past when his PSA was 6.  It has been as high as 12 in the past.    6/22/21:  Most recent PSA is now 10 in June 2021.  This is similar to his higher PSAs in the past.  His last prostate MRI was in June 2020 and was negative.  He has had two  "negative prostate MRIs.  He has also had a negative prostate biopsy in the past.     Objective   Vital Signs:   /60   Ht 180.3 cm (71\")   Wt 74.4 kg (164 lb)   BMI 22.87 kg/m²     Physical Exam  Vitals and nursing note reviewed.   Constitutional:       Appearance: Normal appearance. He is well-developed.   Pulmonary:      Effort: Pulmonary effort is normal.      Breath sounds: Normal air entry.   Neurological:      Mental Status: He is alert and oriented to person, place, and time.      Motor: Motor function is intact.   Psychiatric:         Mood and Affect: Mood normal.         Behavior: Behavior normal.        Result Review :     PSA    PSA 8/21/20 12/2/20 6/14/21   PSA 8.51 (A) 7.71 (A) 10.300 (A)   (A) Abnormal value                       Assessment and Plan    Diagnoses and all orders for this visit:    1. Elevated prostate specific antigen (PSA) (Primary)  Assessment & Plan:  Repeat PSA in 6 months. We may consider another prostate MRI at that time if it is elevated even more.  His PSA typically runs in the 6 to 10 range.      Orders:  -     POC Urinalysis Dipstick, Automated  -     PSA DIAGNOSTIC; Future    Other orders  -     Cancel: PSA DIAGNOSTIC; Future      Follow Up   Return in about 6 months (around 12/21/2021) for PSA prior to visit.     Patient was given instructions and counseling regarding his condition or for health maintenance advice. Please see specific information pulled into the AVS if appropriate.       "

## 2021-07-07 ENCOUNTER — HOSPITAL ENCOUNTER (OUTPATIENT)
Dept: CT IMAGING | Facility: HOSPITAL | Age: 61
Discharge: HOME | End: 2021-07-07
Admitting: NURSE PRACTITIONER

## 2021-07-07 DIAGNOSIS — R07.9 INTERMITTENT CHEST PAIN: ICD-10-CM

## 2021-07-07 PROCEDURE — 0 IOPAMIDOL PER 1 ML: Performed by: NURSE PRACTITIONER

## 2021-07-07 PROCEDURE — 71260 CT THORAX DX C+: CPT

## 2021-07-07 RX ADMIN — IOPAMIDOL 100 ML: 755 INJECTION, SOLUTION INTRAVENOUS at 18:38

## 2021-07-10 ENCOUNTER — HOSPITAL ENCOUNTER (EMERGENCY)
Facility: HOSPITAL | Age: 61
Discharge: HOME | End: 2021-07-10
Attending: EMERGENCY MEDICINE | Admitting: EMERGENCY MEDICINE

## 2021-07-10 ENCOUNTER — APPOINTMENT (OUTPATIENT)
Dept: GENERAL RADIOLOGY | Facility: HOSPITAL | Age: 61
End: 2021-07-10

## 2021-07-10 VITALS
TEMPERATURE: 97.5 F | OXYGEN SATURATION: 97 % | BODY MASS INDEX: 23.86 KG/M2 | HEIGHT: 71 IN | WEIGHT: 170.42 LBS | DIASTOLIC BLOOD PRESSURE: 62 MMHG | SYSTOLIC BLOOD PRESSURE: 112 MMHG | RESPIRATION RATE: 18 BRPM | HEART RATE: 59 BPM

## 2021-07-10 DIAGNOSIS — M79.602 UPPER EXTREMITY PAIN, ANTERIOR, LEFT: Primary | ICD-10-CM

## 2021-07-10 DIAGNOSIS — M54.12 CERVICAL RADICULOPATHY: ICD-10-CM

## 2021-07-10 LAB
ALBUMIN SERPL-MCNC: 4.4 G/DL (ref 3.5–5.2)
ALBUMIN/GLOB SERPL: 1.6 G/DL
ALP SERPL-CCNC: 102 U/L (ref 39–117)
ALT SERPL W P-5'-P-CCNC: 21 U/L (ref 1–41)
ANION GAP SERPL CALCULATED.3IONS-SCNC: 9.2 MMOL/L (ref 5–15)
AST SERPL-CCNC: 24 U/L (ref 1–40)
BASOPHILS # BLD AUTO: 0.02 10*3/MM3 (ref 0–0.2)
BASOPHILS NFR BLD AUTO: 0.4 % (ref 0–1.5)
BILIRUB SERPL-MCNC: 1 MG/DL (ref 0–1.2)
BUN SERPL-MCNC: 11 MG/DL (ref 8–23)
BUN/CREAT SERPL: 14.7 (ref 7–25)
CALCIUM SPEC-SCNC: 9.2 MG/DL (ref 8.6–10.5)
CHLORIDE SERPL-SCNC: 104 MMOL/L (ref 98–107)
CK MB SERPL-CCNC: 3.25 NG/ML
CK SERPL-CCNC: 153 U/L (ref 20–200)
CO2 SERPL-SCNC: 26.8 MMOL/L (ref 22–29)
CREAT SERPL-MCNC: 0.75 MG/DL (ref 0.76–1.27)
DEPRECATED RDW RBC AUTO: 45.1 FL (ref 37–54)
EOSINOPHIL # BLD AUTO: 0.1 10*3/MM3 (ref 0–0.4)
EOSINOPHIL NFR BLD AUTO: 2.2 % (ref 0.3–6.2)
ERYTHROCYTE [DISTWIDTH] IN BLOOD BY AUTOMATED COUNT: 13.3 % (ref 12.3–15.4)
GFR SERPL CREATININE-BSD FRML MDRD: 105 ML/MIN/1.73
GLOBULIN UR ELPH-MCNC: 2.7 GM/DL
GLUCOSE SERPL-MCNC: 114 MG/DL (ref 65–99)
HCT VFR BLD AUTO: 45.1 % (ref 37.5–51)
HGB BLD-MCNC: 15.4 G/DL (ref 13–17.7)
HOLD SPECIMEN: NORMAL
HOLD SPECIMEN: NORMAL
IMM GRANULOCYTES # BLD AUTO: 0.02 10*3/MM3 (ref 0–0.05)
IMM GRANULOCYTES NFR BLD AUTO: 0.4 % (ref 0–0.5)
LIPASE SERPL-CCNC: 16 U/L (ref 13–60)
LYMPHOCYTES # BLD AUTO: 2.08 10*3/MM3 (ref 0.7–3.1)
LYMPHOCYTES NFR BLD AUTO: 44.8 % (ref 19.6–45.3)
MAGNESIUM SERPL-MCNC: 2 MG/DL (ref 1.6–2.4)
MCH RBC QN AUTO: 31.8 PG (ref 26.6–33)
MCHC RBC AUTO-ENTMCNC: 34.1 G/DL (ref 31.5–35.7)
MCV RBC AUTO: 93 FL (ref 79–97)
MONOCYTES # BLD AUTO: 0.31 10*3/MM3 (ref 0.1–0.9)
MONOCYTES NFR BLD AUTO: 6.7 % (ref 5–12)
NEUTROPHILS NFR BLD AUTO: 2.11 10*3/MM3 (ref 1.7–7)
NEUTROPHILS NFR BLD AUTO: 45.5 % (ref 42.7–76)
NRBC BLD AUTO-RTO: 0 /100 WBC (ref 0–0.2)
NT-PROBNP SERPL-MCNC: 196.7 PG/ML (ref 0–900)
PLATELET # BLD AUTO: 94 10*3/MM3 (ref 140–450)
PMV BLD AUTO: 11.4 FL (ref 6–12)
POTASSIUM SERPL-SCNC: 4.3 MMOL/L (ref 3.5–5.2)
PROT SERPL-MCNC: 7.1 G/DL (ref 6–8.5)
QT INTERVAL: 406 MS
QT INTERVAL: 430 MS
RBC # BLD AUTO: 4.85 10*6/MM3 (ref 4.14–5.8)
SODIUM SERPL-SCNC: 140 MMOL/L (ref 136–145)
TROPONIN I SERPL-MCNC: 0 NG/ML (ref 0–0.6)
TROPONIN I SERPL-MCNC: 0 NG/ML (ref 0–0.6)
WBC # BLD AUTO: 4.64 10*3/MM3 (ref 3.4–10.8)
WHOLE BLOOD HOLD SPECIMEN: NORMAL

## 2021-07-10 PROCEDURE — 99283 EMERGENCY DEPT VISIT LOW MDM: CPT

## 2021-07-10 PROCEDURE — 83880 ASSAY OF NATRIURETIC PEPTIDE: CPT | Performed by: EMERGENCY MEDICINE

## 2021-07-10 PROCEDURE — 84484 ASSAY OF TROPONIN QUANT: CPT

## 2021-07-10 PROCEDURE — 71045 X-RAY EXAM CHEST 1 VIEW: CPT

## 2021-07-10 PROCEDURE — 85025 COMPLETE CBC W/AUTO DIFF WBC: CPT | Performed by: EMERGENCY MEDICINE

## 2021-07-10 PROCEDURE — 83735 ASSAY OF MAGNESIUM: CPT | Performed by: EMERGENCY MEDICINE

## 2021-07-10 PROCEDURE — 82553 CREATINE MB FRACTION: CPT | Performed by: EMERGENCY MEDICINE

## 2021-07-10 PROCEDURE — 83690 ASSAY OF LIPASE: CPT | Performed by: EMERGENCY MEDICINE

## 2021-07-10 PROCEDURE — 93005 ELECTROCARDIOGRAM TRACING: CPT

## 2021-07-10 PROCEDURE — 71045 X-RAY EXAM CHEST 1 VIEW: CPT | Performed by: RADIOLOGY

## 2021-07-10 PROCEDURE — 82550 ASSAY OF CK (CPK): CPT | Performed by: EMERGENCY MEDICINE

## 2021-07-10 PROCEDURE — 93010 ELECTROCARDIOGRAM REPORT: CPT | Performed by: INTERNAL MEDICINE

## 2021-07-10 PROCEDURE — 80053 COMPREHEN METABOLIC PANEL: CPT | Performed by: EMERGENCY MEDICINE

## 2021-07-10 PROCEDURE — 93005 ELECTROCARDIOGRAM TRACING: CPT | Performed by: EMERGENCY MEDICINE

## 2021-07-10 RX ORDER — ASPIRIN 81 MG/1
324 TABLET, CHEWABLE ORAL ONCE
Status: COMPLETED | OUTPATIENT
Start: 2021-07-10 | End: 2021-07-10

## 2021-07-10 RX ORDER — SODIUM CHLORIDE 0.9 % (FLUSH) 0.9 %
10 SYRINGE (ML) INJECTION AS NEEDED
Status: DISCONTINUED | OUTPATIENT
Start: 2021-07-10 | End: 2021-07-10 | Stop reason: HOSPADM

## 2021-07-10 RX ADMIN — ASPIRIN 81 MG CHEWABLE TABLET 324 MG: 81 TABLET CHEWABLE at 08:09

## 2021-07-10 NOTE — ED PROVIDER NOTES
"Time: 7:53 AM EDT  Arrived by: Chest pain  Chief Complaint: Car  History provided by: Patient  History is limited by: N/A    History of Present Illness:    Andry Harper is a 61 y.o. male who presents to the emergency department today with complaints of left arm pain. The patient states that his pain first began last night around 0100 and that it went away temporarily, but that it worsened significantly this morning. The patient adds that his pain is also present in the left shoulder region; however, since his arrival in the ED, his overall pain has largely subsided.    In addition to her arm pain, patient has had mild chest pain which feels similar to prior episodes. He also notes mild shortness of breath and generalized weakness upon waking, though he denies any fever, chills, cough, vomiting, diarrhea, or neck pain. He has also had an itchy and painful bug bite with significant redness. The patient notes that he received a CT a few days ago with Dr. Bryant's office, though he has not yet received the results. The patient has a history of hypertension, anemia, and arteriovenous malformation. The patient denies smoking, drinking, or drug use. There are no other acute complaints at this time.        History provided by:  Patient   used: No    Arm Pain  Location:  Left arm and left shoulder. Mild chest pain.  Quality:  \"Pain.\"  Severity:  Moderate  Onset quality:  Sudden  Duration:  8 hours  Timing:  Intermittent  Progression:  Worsening  Chronicity:  New  Context:  Patient had left arm/shoulder pain last night around 0100. His pain returned this morning. His discomfort is improving in the ED.  Relieved by:  Nothing  Worsened by:  Nothing  Ineffective treatments:  N/A  Associated symptoms: chest pain (mild) and shortness of breath    Associated symptoms: no cough, no diarrhea, no rash and no vomiting    Risk factors:  Patient has a history of hypertension, anemia, AVM, hyperlipidemia, and lung " disease.      Chest pain left arm    Similar Symptoms Previously: No.  Recently seen: Patient saw Dr. Hightower on 6/21/2021 and was diagnosed with elevated prostate specific antigen. He states he received a CT chest a few days ago with Dr. Bryant.      Patient Care Team  Primary Care Provider: Keenan Craft.  Pulmonologist: Dr. Bryant.    Past Medical History:     Allergies   Allergen Reactions   • Penicillins Unknown - Low Severity     Past Medical History:   Diagnosis Date   • Anemia    • AVM (arteriovenous malformation)     cecal AVM   • Hyperlipidemia    • Hypertension    • Lung disease      Past Surgical History:   Procedure Laterality Date   • COLONOSCOPY     • LUNG BIOPSY Right      Family History   Problem Relation Age of Onset   • Lymphoma Father        Home Medications:  Prior to Admission medications    Medication Sig Start Date End Date Taking? Authorizing Provider   albuterol sulfate HFA (Ventolin HFA) 108 (90 Base) MCG/ACT inhaler Ventolin HFA 90 mcg/actuation inhalation HFA aerosol inhaler inhale 1 puff (90 mcg) by inhalation route every 4-6 hours as needed   Active    Provider, MD Raul   atorvastatin (LIPITOR) 20 MG tablet atorvastatin 20 mg oral tablet take 1 tablet (20 mg) by oral route once daily   Active    Provider, MD Raul   atorvastatin (LIPITOR) 40 MG tablet  5/27/21   Provider, MD Raul   budesonide-formoterol (Symbicort) 160-4.5 MCG/ACT inhaler Symbicort 160-4.5 mcg/actuation inhalation HFA aerosol inhaler inhale 2 puffs by inhalation route 2 times per day in the morning and evening   Active    Provider, MD Raul   tamsulosin (FLOMAX) 0.4 MG capsule 24 hr capsule Take 1 capsule by mouth Daily for 90 days. 6/9/21 9/7/21  Michelle Hightower MD        Social History:   PT  reports that he has never smoked. He has never used smokeless tobacco. He reports previous alcohol use. He reports that he does not use drugs.    Record Review:  I have reviewed the patient's records  "in Epic.     Review of Systems  Review of Systems   Constitutional: Negative for chills.   HENT: Negative for nosebleeds.    Eyes: Negative for redness.   Respiratory: Positive for shortness of breath. Negative for cough.    Cardiovascular: Positive for chest pain (mild).   Gastrointestinal: Negative for diarrhea and vomiting.   Genitourinary: Negative for dysuria and frequency.   Musculoskeletal: Positive for arthralgias (left shoulder). Negative for neck pain.        Left arm pain.   Skin: Negative for rash.        Itchy/painful bug bite.   Neurological: Positive for weakness (generalized). Negative for seizures.   All other systems reviewed and are negative.       Physical Exam  /70   Pulse 56   Temp 97.5 °F (36.4 °C) (Oral)   Resp 18   Ht 180.3 cm (71\")   Wt 77.3 kg (170 lb 6.7 oz)   SpO2 97%   BMI 23.77 kg/m²     Physical Exam  Vitals and nursing note reviewed.   Constitutional:       General: He is not in acute distress.  HENT:      Head: Normocephalic and atraumatic.      Nose: Nose normal.      Mouth/Throat:      Mouth: Mucous membranes are moist.   Eyes:      General: No scleral icterus.  Cardiovascular:      Rate and Rhythm: Normal rate and regular rhythm.      Heart sounds: Normal heart sounds. No murmur heard.     Pulmonary:      Effort: No respiratory distress.      Breath sounds: Normal breath sounds.   Abdominal:      Palpations: Abdomen is soft.      Tenderness: There is no abdominal tenderness.      Hernia: No hernia is present.   Musculoskeletal:         General: No tenderness. Normal range of motion.      Cervical back: Normal range of motion and neck supple. No tenderness.      Right lower leg: No edema.      Left lower leg: No edema.   Skin:     General: Skin is warm and dry.   Neurological:      Mental Status: He is alert. Mental status is at baseline.      Sensory: No sensory deficit.      Motor: No weakness.   Psychiatric:         Behavior: Behavior normal.                  ED " "Course  /70   Pulse 56   Temp 97.5 °F (36.4 °C) (Oral)   Resp 18   Ht 180.3 cm (71\")   Wt 77.3 kg (170 lb 6.7 oz)   SpO2 97%   BMI 23.77 kg/m²   Results for orders placed or performed during the hospital encounter of 07/10/21   Comprehensive Metabolic Panel    Specimen: Blood   Result Value Ref Range    Glucose 114 (H) 65 - 99 mg/dL    BUN 11 8 - 23 mg/dL    Creatinine 0.75 (L) 0.76 - 1.27 mg/dL    Sodium 140 136 - 145 mmol/L    Potassium 4.3 3.5 - 5.2 mmol/L    Chloride 104 98 - 107 mmol/L    CO2 26.8 22.0 - 29.0 mmol/L    Calcium 9.2 8.6 - 10.5 mg/dL    Total Protein 7.1 6.0 - 8.5 g/dL    Albumin 4.40 3.50 - 5.20 g/dL    ALT (SGPT) 21 1 - 41 U/L    AST (SGOT) 24 1 - 40 U/L    Alkaline Phosphatase 102 39 - 117 U/L    Total Bilirubin 1.0 0.0 - 1.2 mg/dL    eGFR Non African Amer 105 >60 mL/min/1.73    Globulin 2.7 gm/dL    A/G Ratio 1.6 g/dL    BUN/Creatinine Ratio 14.7 7.0 - 25.0    Anion Gap 9.2 5.0 - 15.0 mmol/L   Lipase    Specimen: Blood   Result Value Ref Range    Lipase 16 13 - 60 U/L   BNP    Specimen: Blood   Result Value Ref Range    proBNP 196.7 0.0 - 900.0 pg/mL   Magnesium    Specimen: Blood   Result Value Ref Range    Magnesium 2.0 1.6 - 2.4 mg/dL   CK Total & CKMB    Specimen: Blood   Result Value Ref Range    CKMB 3.25 <=10.40 ng/mL    Creatine Kinase 153 20 - 200 U/L   CBC Auto Differential    Specimen: Blood   Result Value Ref Range    WBC 4.64 3.40 - 10.80 10*3/mm3    RBC 4.85 4.14 - 5.80 10*6/mm3    Hemoglobin 15.4 13.0 - 17.7 g/dL    Hematocrit 45.1 37.5 - 51.0 %    MCV 93.0 79.0 - 97.0 fL    MCH 31.8 26.6 - 33.0 pg    MCHC 34.1 31.5 - 35.7 g/dL    RDW 13.3 12.3 - 15.4 %    RDW-SD 45.1 37.0 - 54.0 fl    MPV 11.4 6.0 - 12.0 fL    Platelets 94 (L) 140 - 450 10*3/mm3    Neutrophil % 45.5 42.7 - 76.0 %    Lymphocyte % 44.8 19.6 - 45.3 %    Monocyte % 6.7 5.0 - 12.0 %    Eosinophil % 2.2 0.3 - 6.2 %    Basophil % 0.4 0.0 - 1.5 %    Immature Grans % 0.4 0.0 - 0.5 %    Neutrophils, Absolute " 2.11 1.70 - 7.00 10*3/mm3    Lymphocytes, Absolute 2.08 0.70 - 3.10 10*3/mm3    Monocytes, Absolute 0.31 0.10 - 0.90 10*3/mm3    Eosinophils, Absolute 0.10 0.00 - 0.40 10*3/mm3    Basophils, Absolute 0.02 0.00 - 0.20 10*3/mm3    Immature Grans, Absolute 0.02 0.00 - 0.05 10*3/mm3    nRBC 0.0 0.0 - 0.2 /100 WBC   POC Troponin I    Specimen: Blood   Result Value Ref Range    Troponin I 0.00 0.00 - 0.60 ng/mL   POC Troponin I    Specimen: Blood   Result Value Ref Range    Troponin I 0.00 0.00 - 0.60 ng/mL   ECG 12 Lead   Result Value Ref Range    QT Interval 406 ms   ECG 12 Lead   Result Value Ref Range    QT Interval 430 ms   Green Top (Gel)   Result Value Ref Range    Extra Tube Hold for add-ons.    Lavender Top   Result Value Ref Range    Extra Tube hold for add-on    Gold Top - SST   Result Value Ref Range    Extra Tube Hold for add-ons.      Medications   sodium chloride 0.9 % flush 10 mL (has no administration in time range)   aspirin chewable tablet 324 mg (324 mg Oral Given 7/10/21 0809)     CT Chest With Contrast Diagnostic    Result Date: 7/7/2021  Narrative: PROCEDURE: CT CHEST W CONTRAST DIAGNOSTIC  COMPARISON:  [facility], CT, CHEST W/O CONTRAST, 12/18/2020, 10:25. INDICATIONS: Intermittent mid chest pain, shortness of breath x 1 year.  TECHNIQUE: After obtaining the patient's consent, CT images were obtained with non-ionic intravenous contrast material.   PROTOCOL:   Standard imaging protocol performed    RADIATION:      Automated exposure control was utilized to minimize radiation dose.  FINDINGS:  Biapical scarring is similar.  Chronic opacities in both upper lobes.  Right upper lobe nodule measures 9 mm (image 33) and is not significantly changed.  3-4 mm nodule right middle lobe (image 45) is stable.  Ground-glass micro nodularity in the medial right lower lobe and posterolateral left lower lobe is similar to the previous study.  No new dense consolidation.  No new or enlarging  pulmonary nodule.  No adenopathy in the chest.  No acute findings in the included upper abdomen.  No aggressive appearing bone change.  CONCLUSION: Findings are very similar to 12/18/2020.  Stable biapical scarring and upper lobe nodular opacities.  Two nodules in the right lung are unchanged.  Stable chronic infectious or inflammatory small airways disease in the lower lobes.     FEDERICA SAUER MD       Electronically Signed and Approved By: FEDERICA SAUER MD on 7/07/2021 at 20:44             XR Chest 1 View    Result Date: 7/10/2021  Narrative: PROCEDURE: XR CHEST 1 VW  COMPARISON: [facility], CT, CT CHEST W CONTRAST DIAGNOSTIC, 7/07/2021, 18:30.  [facility], CR, CHEST PA/AP & LAT 2V, 2/10/2018, 9:29.  INDICATIONS: CHEST PAIN  FINDINGS:  Chronic irregular density in the right lung apex is consistent with fibrosis.  No acute infiltrate or effusion is identified.  The cardiac and mediastinal silhouettes appear normal.  CONCLUSION:  1. Chronic right apically scarring 2. No acute infiltrate       Hema Landa M.D.       Electronically Signed and Approved By: Hema Landa M.D. on 7/10/2021 at 8:22               Procedures/EKGs:  Procedures    EKG:    Rhythm: Normal sinus rhythm  Rate: 62  All else normal.    EKG Comparison: N/A    Interpreted by me.        Medical Decision Making:                     MDM  Number of Diagnoses or Management Options  Diagnosis management comments: The patient has been resting comfortably in the emergency department and has had no pain while he has been in the emergency department.  The patient has had left upper extremity pain.  He has had no neurologic symptoms.  He has had no chest pain or shortness of breath no fever chills or cough.  I considered myocardial infarction, acute myocardial ischemia, pneumothorax, pneumonia, acute pleurisy, cervical radiculopathy, and stroke.  Patient does not show any signs of severe or serious medical issues or problems acutely.   He will be discharged home and he was informed he should take ibuprofen apply moist heat to the affected area when he does have pain and is to follow-up with his primary care doctor.    The patient did have a recent CT done this past week which revealed chronic changes to the lungs bilaterally but no signs of acute change, tumor, other severe abnormality.       Amount and/or Complexity of Data Reviewed  Clinical lab tests: reviewed  Tests in the radiology section of CPT®: reviewed  Tests in the medicine section of CPT®: reviewed         Final diagnoses:   Upper extremity pain, anterior, left   Cervical radiculopathy        Disposition:  ED Disposition     ED Disposition Condition Comment    Discharge Stable           Documentation assistance provided by Mireya Rg acting as scribe for Barak Lucas DO. Information recorded by the scribe was done at my direction and has been verified and validated by me.        Mireya Rg  07/10/21 0911       Mireya Rg  07/10/21 0935       Mireya Rg  07/10/21 1033       Mireya Rg  07/10/21 1041       Barak Lucas DO  07/10/21 1048

## 2021-07-10 NOTE — DISCHARGE INSTRUCTIONS
Apply moist heat to affected area if you have a return of pain.  Leave on for 20 minutes at a time and apply 4 times daily.  Take ibuprofen for pain.  Return for worsening symptoms or severe symptoms such as chest pain shortness of breath or other severe symptoms.

## 2021-07-29 RX ORDER — BUDESONIDE AND FORMOTEROL FUMARATE DIHYDRATE 160; 4.5 UG/1; UG/1
AEROSOL RESPIRATORY (INHALATION)
Qty: 10.2 G | Refills: 0 | Status: SHIPPED | OUTPATIENT
Start: 2021-07-29 | End: 2021-09-14

## 2021-07-30 DIAGNOSIS — N20.0 KIDNEY STONES: Primary | ICD-10-CM

## 2021-08-02 ENCOUNTER — TELEPHONE (OUTPATIENT)
Dept: ONCOLOGY | Facility: HOSPITAL | Age: 61
End: 2021-08-02

## 2021-08-02 NOTE — TELEPHONE ENCOUNTER
Caller: YESSY      Relationship to patient: SELF     Best call back number: [phone]    PATIENT ASKED FOR GEMINI OR A NURSE TO CALL HIM IN REGARDS TO HIS BLOODWORK.   PLEASE CALL AND ADVISE   THANK YOU

## 2021-08-03 DIAGNOSIS — D64.9 ANEMIA, UNSPECIFIED TYPE: Primary | ICD-10-CM

## 2021-08-03 DIAGNOSIS — M25.50 ARTHRALGIA, UNSPECIFIED JOINT: ICD-10-CM

## 2021-08-04 ENCOUNTER — LAB (OUTPATIENT)
Dept: LAB | Facility: HOSPITAL | Age: 61
End: 2021-08-04

## 2021-08-04 DIAGNOSIS — M25.50 ARTHRALGIA, UNSPECIFIED JOINT: ICD-10-CM

## 2021-08-04 DIAGNOSIS — D64.9 ANEMIA, UNSPECIFIED TYPE: ICD-10-CM

## 2021-08-04 LAB
BASOPHILS # BLD AUTO: 0.02 10*3/MM3 (ref 0–0.2)
BASOPHILS NFR BLD AUTO: 0.3 % (ref 0–1.5)
DEPRECATED RDW RBC AUTO: 48.6 FL (ref 37–54)
EOSINOPHIL # BLD AUTO: 0.11 10*3/MM3 (ref 0–0.4)
EOSINOPHIL NFR BLD AUTO: 1.8 % (ref 0.3–6.2)
ERYTHROCYTE [DISTWIDTH] IN BLOOD BY AUTOMATED COUNT: 14 % (ref 12.3–15.4)
FERRITIN SERPL-MCNC: 510.8 NG/ML (ref 30–400)
HCT VFR BLD AUTO: 43.7 % (ref 37.5–51)
HGB BLD-MCNC: 14.5 G/DL (ref 13–17.7)
IMM GRANULOCYTES # BLD AUTO: 0.03 10*3/MM3 (ref 0–0.05)
IMM GRANULOCYTES NFR BLD AUTO: 0.5 % (ref 0–0.5)
LARGE PLATELETS: NORMAL
LYMPHOCYTES # BLD AUTO: 2.2 10*3/MM3 (ref 0.7–3.1)
LYMPHOCYTES NFR BLD AUTO: 37 % (ref 19.6–45.3)
MCH RBC QN AUTO: 31.7 PG (ref 26.6–33)
MCHC RBC AUTO-ENTMCNC: 33.2 G/DL (ref 31.5–35.7)
MCV RBC AUTO: 95.4 FL (ref 79–97)
MONOCYTES # BLD AUTO: 0.41 10*3/MM3 (ref 0.1–0.9)
MONOCYTES NFR BLD AUTO: 6.9 % (ref 5–12)
NEUTROPHILS NFR BLD AUTO: 3.18 10*3/MM3 (ref 1.7–7)
NEUTROPHILS NFR BLD AUTO: 53.5 % (ref 42.7–76)
NRBC BLD AUTO-RTO: 0 /100 WBC (ref 0–0.2)
PLATELET # BLD AUTO: 112 10*3/MM3 (ref 140–450)
PMV BLD AUTO: 11.2 FL (ref 6–12)
RBC # BLD AUTO: 4.58 10*6/MM3 (ref 4.14–5.8)
RBC MORPH BLD: NORMAL
SMALL PLATELETS BLD QL SMEAR: NORMAL
WBC # BLD AUTO: 5.95 10*3/MM3 (ref 3.4–10.8)
WBC MORPH BLD: NORMAL

## 2021-08-04 PROCEDURE — 85007 BL SMEAR W/DIFF WBC COUNT: CPT

## 2021-08-04 PROCEDURE — 82306 VITAMIN D 25 HYDROXY: CPT

## 2021-08-04 PROCEDURE — 82746 ASSAY OF FOLIC ACID SERUM: CPT

## 2021-08-04 PROCEDURE — 85025 COMPLETE CBC W/AUTO DIFF WBC: CPT

## 2021-08-04 PROCEDURE — 82728 ASSAY OF FERRITIN: CPT

## 2021-08-04 PROCEDURE — 82607 VITAMIN B-12: CPT

## 2021-08-05 LAB
25(OH)D3 SERPL-MCNC: 20.1 NG/ML
FOLATE SERPL-MCNC: 16.6 NG/ML (ref 4.78–24.2)
VIT B12 BLD-MCNC: 472 PG/ML (ref 211–946)

## 2021-08-06 ENCOUNTER — OFFICE VISIT (OUTPATIENT)
Dept: ONCOLOGY | Facility: HOSPITAL | Age: 61
End: 2021-08-06

## 2021-08-06 VITALS
WEIGHT: 168.43 LBS | OXYGEN SATURATION: 100 % | RESPIRATION RATE: 16 BRPM | HEART RATE: 66 BPM | SYSTOLIC BLOOD PRESSURE: 105 MMHG | DIASTOLIC BLOOD PRESSURE: 59 MMHG | TEMPERATURE: 97.3 F | BODY MASS INDEX: 23.49 KG/M2

## 2021-08-06 DIAGNOSIS — E55.9 VITAMIN D DEFICIENCY: Primary | ICD-10-CM

## 2021-08-06 DIAGNOSIS — D64.9 ANEMIA, UNSPECIFIED TYPE: ICD-10-CM

## 2021-08-06 PROBLEM — E78.00 HYPERCHOLESTEREMIA: Status: ACTIVE | Noted: 2021-08-06

## 2021-08-06 PROBLEM — J30.9 ALLERGIC RHINITIS: Status: ACTIVE | Noted: 2021-08-06

## 2021-08-06 PROBLEM — N40.0 BPH WITHOUT URINARY OBSTRUCTION: Status: ACTIVE | Noted: 2020-06-10

## 2021-08-06 PROBLEM — J18.9 PNEUMONIA: Status: ACTIVE | Noted: 2021-08-06

## 2021-08-06 PROBLEM — E78.5 HYPERLIPEMIA: Status: ACTIVE | Noted: 2021-08-06

## 2021-08-06 PROCEDURE — 99212 OFFICE O/P EST SF 10 MIN: CPT | Performed by: NURSE PRACTITIONER

## 2021-08-06 PROCEDURE — G0463 HOSPITAL OUTPT CLINIC VISIT: HCPCS | Performed by: NURSE PRACTITIONER

## 2021-08-06 RX ORDER — ATORVASTATIN CALCIUM 40 MG/1
TABLET, FILM COATED ORAL
COMMUNITY
Start: 2021-07-27 | End: 2021-08-27 | Stop reason: SDUPTHER

## 2021-08-06 RX ORDER — ERGOCALCIFEROL 1.25 MG/1
50000 CAPSULE ORAL
Qty: 5 CAPSULE | Refills: 2 | Status: SHIPPED | OUTPATIENT
Start: 2021-08-06 | End: 2021-11-13

## 2021-08-06 NOTE — PATIENT INSTRUCTIONS
Labs in 6 months on same day of appointment.     Follow up in 6 months with NP     Will start oral Vitamin D and send to pharmacy.

## 2021-08-06 NOTE — PROGRESS NOTES
Chief Complaint  SHAHEEN (-FU)    Keenan Craft MD Smith, Robert Eugene, MD      Subjective          Andry Harper presents to [facility] for SHAHEEN.     History of Present Illness     Mr. Andry Harper presents for 6 month follow up for SHAHEEN and thrombocytopenia. 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 He completed recent labs which show his anemia is stable. He does have mild thrombocytopenia which is improving from previous lab results. Recent count in the 110 range up from 96 previously. He denies any GI blood loss. His Vitamin D level was checked and does show he is deficient. I will start Vitamin D 50,000 weekly for about 12 weeks, then he can transition oral Vitamin D.         Oncology/Hematology History    No history exists.   Mr. Harper is a well known patient to [facility] for previous evaluation of     thrombocytopenia which resolved.  Most recently found to have iron deficiency.      In October 2018 Iron was 43  TIBC 347 and Percent saturation 12.  H/H was 14.9 /    43.2 MCV 89.      He underwent EGD and colonoscopy 10/26/2018.  Colonoscopy with 2 polyps and a     cecal AVM was noted and bleeding this was cauterized. EGD revealed a hiatal her    misha.              In December 2018 Iron was 59  TIBC 405 and Percent saturation 19 with H/H     13.9/40.4 and Platelet count 150,000.  This was repeated again in January 2019     with Iron 50 TIBC 383 Percent sat 15  H/H 14.8/42 and Platelet count 157,000.      He started on oral iron 2 months ago.  Denies hematemesis, melena and BRB with     BMs.  He denies fatigue and pagophagia.                Review of Systems   Constitutional: Negative for fatigue.   HENT: Negative for congestion, nosebleeds and rhinorrhea.    Eyes: Negative for blurred vision.   Respiratory: Negative for cough and shortness of breath.    Cardiovascular: Negative for chest pain and leg swelling.   Gastrointestinal: Negative  for blood in stool, constipation, diarrhea and indigestion.   Endocrine: Negative for cold intolerance and heat intolerance.   Musculoskeletal: Negative for arthralgias.   Neurological: Negative for dizziness.   Psychiatric/Behavioral: Negative for agitation, behavioral problems and depressed mood.   All other systems reviewed and are negative.      Current Outpatient Medications on File Prior to Visit   Medication Sig Dispense Refill   • albuterol sulfate HFA (Ventolin HFA) 108 (90 Base) MCG/ACT inhaler Ventolin HFA 90 mcg/actuation inhalation HFA aerosol inhaler inhale 1 puff (90 mcg) by inhalation route every 4-6 hours as needed   Active     • atorvastatin (LIPITOR) 20 MG tablet atorvastatin 20 mg oral tablet take 1 tablet (20 mg) by oral route once daily   Active     • atorvastatin (LIPITOR) 40 MG tablet      • budesonide-formoterol (SYMBICORT) 160-4.5 MCG/ACT inhaler INHALE TWO PUFFS BY MOUTH TWICE A DAY 10.2 g 0   • tamsulosin (FLOMAX) 0.4 MG capsule 24 hr capsule Take 1 capsule by mouth Daily for 90 days. 90 capsule 3     No current facility-administered medications on file prior to visit.       Allergies   Allergen Reactions   • Penicillins Unknown - Low Severity     Past Medical History:   Diagnosis Date   • Anemia    • AVM (arteriovenous malformation)     cecal AVM   • Hyperlipidemia    • Hypertension    • Lung disease      Past Surgical History:   Procedure Laterality Date   • COLONOSCOPY     • LUNG BIOPSY Right      Social History     Socioeconomic History   • Marital status: Single     Spouse name: Not on file   • Number of children: Not on file   • Years of education: Not on file   • Highest education level: Not on file   Tobacco Use   • Smoking status: Never Smoker   • Smokeless tobacco: Never Used   Vaping Use   • Vaping Use: Never used   Substance and Sexual Activity   • Alcohol use: Not Currently   • Drug use: Never   • Sexual activity: Defer     Family History   Problem Relation Age of Onset   •  Lymphoma Father      Immunization History   Administered Date(s) Administered   • COVID-19 (PFIZER) 04/05/2021, 04/26/2021   • Flu Vaccine Intradermal Quad 18-64YR 10/07/2020   • Flu Vaccine Quad PF >36MO 10/01/2018, 09/30/2019   • Influenza, Unspecified 09/01/2019       Objective   Physical Exam  Vitals and nursing note reviewed.   Constitutional:       Appearance: Normal appearance. He is normal weight.   HENT:      Head: Normocephalic.      Nose: Nose normal.      Mouth/Throat:      Mouth: Mucous membranes are dry.   Eyes:      Conjunctiva/sclera: Conjunctivae normal.      Pupils: Pupils are equal, round, and reactive to light.   Cardiovascular:      Rate and Rhythm: Normal rate and regular rhythm.      Pulses: Normal pulses.      Heart sounds: Normal heart sounds. No murmur heard.     Pulmonary:      Effort: Pulmonary effort is normal.      Breath sounds: Normal breath sounds.   Abdominal:      General: Bowel sounds are normal. There is no distension.      Palpations: Abdomen is soft.   Musculoskeletal:         General: Normal range of motion.      Cervical back: Normal range of motion.   Skin:     General: Skin is warm and dry.   Neurological:      General: No focal deficit present.      Mental Status: He is alert and oriented to person, place, and time.   Psychiatric:         Mood and Affect: Mood normal.         Behavior: Behavior normal.         Thought Content: Thought content normal.         Judgment: Judgment normal.         Vitals:    08/06/21 1050   BP: 105/59   Pulse: 66   Resp: 16   Temp: 97.3 °F (36.3 °C)   SpO2: 100%   Weight: 76.4 kg (168 lb 6.9 oz)   PainSc: 0-No pain     ECOG score: 0         ECOG: (0) Fully Active - Able to Carry On All Pre-disease Performance Without Restriction  Fall Risk Assessment was completed, and patient is at low risk for falls.  PHQ-9 Total Score: 0       The patient is not  experiencing fatigue. Fatigue score: 0    PT/OT Functional Screening: PT fx screen: No needs  identified  Speech Functional Screening: Speech fx screen: No needs identified  Rehab to be ordered: Rehab to be ordered: No needs identified        Result Review :   The following data was reviewed by: MIKE Greenwood on 08/06/2021:  Lab Results   Component Value Date    HGB 14.5 08/04/2021    HCT 43.7 08/04/2021    MCV 95.4 08/04/2021     (L) 08/04/2021    WBC 5.95 08/04/2021    NEUTROABS 3.18 08/04/2021    LYMPHSABS 2.20 08/04/2021    MONOSABS 0.41 08/04/2021    EOSABS 0.11 08/04/2021    BASOSABS 0.02 08/04/2021     Lab Results   Component Value Date    GLUCOSE 114 (H) 07/10/2021    BUN 11 07/10/2021    CREATININE 0.75 (L) 07/10/2021     07/10/2021    K 4.3 07/10/2021     07/10/2021    CO2 26.8 07/10/2021    CALCIUM 9.2 07/10/2021    PROTEINTOT 7.1 07/10/2021    ALBUMIN 4.40 07/10/2021    BILITOT 1.0 07/10/2021    ALKPHOS 102 07/10/2021    AST 24 07/10/2021    ALT 21 07/10/2021          Assessment and Plan    Diagnoses and all orders for this visit:    1. Vitamin D deficiency (Primary)  -     vitamin D (ERGOCALCIFEROL) 1.25 MG (29761 UT) capsule capsule; Take 1 capsule by mouth Every 7 (Seven) Days for 15 doses.  Dispense: 5 capsule; Refill: 2    2. Anemia, unspecified type  -     CBC & Differential; Future  -     Comprehensive Metabolic Panel; Future  -     Iron Profile; Future  -     Ferritin; Future    hemoglobin is stable. Iron labs are stable. Denies any GI blood loss or persistent fatigue. Recheck CBC, iron panel, ferritin, CMP in 6 months. .     2) Vitamin D deficiency: Start Vitamin D 3, 50,000 units every week for 12 weeks, then start 800 units daily.         Patient Follow Up: 6 months with NP.     Patient was given instructions and counseling regarding his condition or for health maintenance advice. Please see specific information pulled into the AVS if appropriate.     Dalia Rogers, APRN    8/6/2021

## 2021-08-11 ENCOUNTER — OFFICE VISIT (OUTPATIENT)
Dept: CARDIOLOGY | Facility: CLINIC | Age: 61
End: 2021-08-11

## 2021-08-11 VITALS
DIASTOLIC BLOOD PRESSURE: 62 MMHG | WEIGHT: 166 LBS | HEART RATE: 60 BPM | HEIGHT: 71 IN | SYSTOLIC BLOOD PRESSURE: 118 MMHG | BODY MASS INDEX: 23.24 KG/M2

## 2021-08-11 DIAGNOSIS — R07.2 PRECORDIAL PAIN: Primary | ICD-10-CM

## 2021-08-11 DIAGNOSIS — R06.09 DYSPNEA ON EXERTION: ICD-10-CM

## 2021-08-11 PROCEDURE — 99204 OFFICE O/P NEW MOD 45 MIN: CPT | Performed by: INTERNAL MEDICINE

## 2021-08-11 NOTE — PROGRESS NOTES
Chief Complaint  New Patient (Establish cardiologist - No complaints) and Shortness of Breath (Been going on for awhile - with exertion)    Subjective            Andry Harper presents to [facility] CARDIOLOGY  History of Present Illness    61-year-old white male, he has no previous cardiac history.  He has a history of some degree of restrictive lung disease and decreased diffusion capacity on previous PFTs.  He has been having chronic exertional weakness and exertional shortness of breath which is worse over the past several months.  With rest it improves after 5 to 10 minutes.  Is associated with some mild chest tightness and pressure.  He denies palpitations or subjective tachycardia.  Baseline EKG in July showed sinus bradycardia otherwise normal.  Previous echo 2019 was unremarkable.  He is a non-smoker.  He has a significant family history of CAD.    [facility]  Past Medical History:   Diagnosis Date   • Anemia    • AVM (arteriovenous malformation)     cecal AVM   • Hyperlipidemia    • Hypertension    • Lung disease          SURGICALHX  Past Surgical History:   Procedure Laterality Date   • COLONOSCOPY     • LUNG BIOPSY Right         SOC  Social History     Socioeconomic History   • Marital status: Single     Spouse name: Not on file   • Number of children: Not on file   • Years of education: Not on file   • Highest education level: Not on file   Tobacco Use   • Smoking status: Never Smoker   • Smokeless tobacco: Never Used   Vaping Use   • Vaping Use: Never used   Substance and Sexual Activity   • Alcohol use: Not Currently   • Drug use: Never   • Sexual activity: Defer         FAMHX  Family History   Problem Relation Age of Onset   • Lymphoma Father    • Cancer Father    • Stroke Mother    • Heart disease Brother    • Heart attack Brother    • Stroke Brother    • Diabetes Brother    • Cancer Brother    • Kidney disease Brother           ALLERGY  Allergies   Allergen Reactions   • Penicillins  "Unknown - Low Severity        MEDSCURRENT    Current Outpatient Medications:   •  albuterol sulfate HFA (Ventolin HFA) 108 (90 Base) MCG/ACT inhaler, Ventolin HFA 90 mcg/actuation inhalation HFA aerosol inhaler inhale 1 puff (90 mcg) by inhalation route every 4-6 hours as needed   Active, Disp: , Rfl:   •  atorvastatin (LIPITOR) 40 MG tablet, , Disp: , Rfl:   •  budesonide-formoterol (SYMBICORT) 160-4.5 MCG/ACT inhaler, INHALE TWO PUFFS BY MOUTH TWICE A DAY, Disp: 10.2 g, Rfl: 0  •  tamsulosin (FLOMAX) 0.4 MG capsule 24 hr capsule, Take 1 capsule by mouth Daily for 90 days., Disp: 90 capsule, Rfl: 3  •  vitamin D (ERGOCALCIFEROL) 1.25 MG (98451 UT) capsule capsule, Take 1 capsule by mouth Every 7 (Seven) Days for 15 doses., Disp: 5 capsule, Rfl: 2  •  atorvastatin (LIPITOR) 20 MG tablet, atorvastatin 20 mg oral tablet take 1 tablet (20 mg) by oral route once daily   Active, Disp: , Rfl:       Review of Systems   Constitutional: Positive for malaise/fatigue.   HENT: Negative.    Eyes: Negative.    Cardiovascular: Positive for dyspnea on exertion.        Chest tightness/pressure with exertion   Respiratory: Positive for shortness of breath.    Endocrine: Negative.    Hematologic/Lymphatic: Negative.    Skin: Negative.    Musculoskeletal: Negative.    Gastrointestinal: Negative.    Genitourinary: Negative.    Neurological: Negative.    Psychiatric/Behavioral: Negative.         Objective     /62 (Patient Position: Sitting)   Pulse 60   Ht 180.3 cm (71\")   Wt 75.3 kg (166 lb)   BMI 23.15 kg/m²       General Appearance:   · well developed  · well nourished  HENT:   · oropharynx moist  · lips not cyanotic  Neck:  · thyroid not enlarged  · supple  Respiratory:  · no respiratory distress  · Scattered rhonchi more on the right side  · no rales  Cardiovascular:  · no jugular venous distention  · regular rhythm  · apical impulse normal  · S1 normal, S2 normal  · no S3, no S4   · no murmur  · no rub, no " thrill  · carotid pulses normal; no bruit  · pedal pulses normal  · lower extremity edema: none    Musculoskeletal:  · no clubbing of fingers.   · normocephalic, head atraumatic  Skin:   · warm, dry  Psychiatric:  · judgement and insight appropriate  · normal mood and affect      Result Review :     The following data was reviewed by: Amadeo Smith MD on 08/11/2021:    CMP    CMP 2/27/21 6/14/21 7/10/21   Glucose  90 114 (A)   Glucose 95     BUN 12 12 11   Creatinine 0.79 1.01 0.75 (A)   eGFR Non African Am  75 105   Sodium 140 140 140   Potassium 4.5 4.3 4.3   Chloride 105 103 104   Calcium 9.0 9.3 9.2   Albumin 3.8 4.00 4.40   Total Bilirubin 0.86 0.4 1.0   Alkaline Phosphatase 103 91 102   AST (SGOT) 19 21 24   ALT (SGPT) 16 19 21   (A) Abnormal value       Comments are available for some flowsheets but are not being displayed.           CBC    CBC 3/13/21 7/10/21 8/4/21   WBC 6.75 4.64 5.95   RBC 4.57 (A) 4.85 4.58   Hemoglobin 14.3 15.4 14.5   Hematocrit 44.3 45.1 43.7   MCV 96.9 (A) 93.0 95.4   MCH 31.3 (A) 31.8 31.7   MCHC 32.3 (A) 34.1 33.2   RDW 13.5 13.3 14.0   Platelets 132 94 (A) 112 (A)   (A) Abnormal value       Comments are available for some flowsheets but are not being displayed.           Lipid Panel    Lipid Panel 8/21/20 2/27/21   Total Cholesterol 125 145   Triglycerides 50 71   HDL Cholesterol 40 39 (A)   VLDL Cholesterol 10 14   LDL Cholesterol  75 92   (A) Abnormal value       Comments are available for some flowsheets but are not being displayed.             Data reviewed: EKG reviewed, sinus bradycardia, otherwise normal EKG     Procedures                  Assessment and Plan        ASSESSMENT:  Encounter Diagnoses   Name Primary?   • Precordial pain Yes   • Dyspnea on exertion          PLAN:    1.  Mr. Harper has chronic shortness of breath with exertion, somewhat worse.  Relatively vague chest tightness also reported.  He has a significant family history and dyslipidemia.   Stress imaging and echo will be scheduled for further evaluation.  2.  We will discuss results of diagnostics when available.  He is agreeable with this plan.  I currently agree with his medical regimen.          Patient was given instructions and counseling regarding his condition or for health maintenance advice. Please see specific information pulled into the AVS if appropriate.             NANCY Smith MD  8/11/2021    11:39 EDT

## 2021-08-25 ENCOUNTER — OFFICE VISIT (OUTPATIENT)
Dept: PULMONOLOGY | Facility: CLINIC | Age: 61
End: 2021-08-25

## 2021-08-25 VITALS
OXYGEN SATURATION: 98 % | HEIGHT: 71 IN | WEIGHT: 165 LBS | BODY MASS INDEX: 23.1 KG/M2 | SYSTOLIC BLOOD PRESSURE: 114 MMHG | RESPIRATION RATE: 16 BRPM | TEMPERATURE: 98.6 F | HEART RATE: 68 BPM | DIASTOLIC BLOOD PRESSURE: 62 MMHG

## 2021-08-25 DIAGNOSIS — J82.81 EOSINOPHILIC PNEUMONIA (HCC): Primary | ICD-10-CM

## 2021-08-25 PROCEDURE — 99214 OFFICE O/P EST MOD 30 MIN: CPT | Performed by: INTERNAL MEDICINE

## 2021-08-25 NOTE — PROGRESS NOTES
Primary Care Provider  Keenan Craft MD     Referring Provider  No ref. provider found     Chief Complaint  Cough (Productive- green 6-8 week f/u), Shortness of Breath, Fatigue, and Wheezing            History of Presenting Illness  61-year-old  male with history of chronic eosinophilic pneumonia lipoid pneumonia here for follow-up.  He is doing well with Symbicort.  His CAT scan shows no changes.  His symptoms are at baseline.  He has chronic wheezing and chronic cough.  Cough is productive with thin clear sputum.  Rescue inhaler uses 1-2 times per day.  He does have dyspnea which is at his baseline.  He gets short of breath walking about 1000 feet.  It is mild in severity, worse with exertion and relieved with rest.  Patient denies fever, chills, night sweats, swollen glands in the head and neck, hemoptysis, unintentional weight loss, dysphagia, chest pain, chest tightness, palpitations, abdominal pain, nausea, vomiting, and diarrhea.  Patient also denies any myalgias, changes in sense of taste and/or smell, sore throat, any other coronavirus flulike symptoms.  Patient denies any leg swelling, orthopnea, paroxysmal nocturnal dyspnea.  Patient denies any history of any bleeding or blood clotting disorders.  Patient denies any recent travel.  Patient denies any exposure to any ill contacts.  Patient states he is not taking any hormone replacement therapy.  He is able to perform his ADLs without difficulty and denies any swollen glands or lymph nodes in his head and neck.      Review of Systems   Constitutional: Negative for activity change, appetite change, chills, diaphoresis, fatigue, fever, unexpected weight gain and unexpected weight loss.        Negative for Insomnia   HENT: Negative for congestion (Nasal), mouth sores, nosebleeds, postnasal drip, sore throat, swollen glands and trouble swallowing.         Negative for Thrush  Negative for Hoarseness  Negative for Allergies/Hay Fever  Negative  for Recent Head injury  Negative for Ear Fullness  Negative for Nasal or Sinus pain  Negative for Dry lips  Negative for Nasal discharge   Respiratory: Positive for cough, shortness of breath and wheezing. Negative for apnea and chest tightness.         Negative for Hemoptysis  Negative for Pleuritic pain   Cardiovascular: Negative for chest pain, palpitations and leg swelling.        Negative for Claudication  Negative for Cyanosis  Negative for Dyspnea on exertion   Gastrointestinal: Negative for abdominal pain, diarrhea, nausea, vomiting and GERD.   Musculoskeletal: Negative for joint swelling and myalgias.        Negative for Joint pain  Negative for Joint stiffness   Skin: Negative for color change, dry skin, pallor and rash.   Neurological: Negative for syncope, weakness and headache.   Hematological: Negative for adenopathy. Does not bruise/bleed easily.          Family History   Problem Relation Age of Onset   • Lymphoma Father    • Cancer Father    • Stroke Mother    • Heart disease Brother    • Heart attack Brother    • Stroke Brother    • Diabetes Brother    • Cancer Brother    • Kidney disease Brother         Social History     Socioeconomic History   • Marital status: Single     Spouse name: Not on file   • Number of children: Not on file   • Years of education: Not on file   • Highest education level: Not on file   Tobacco Use   • Smoking status: Never Smoker   • Smokeless tobacco: Never Used   Vaping Use   • Vaping Use: Never used   Substance and Sexual Activity   • Alcohol use: Not Currently   • Drug use: Never   • Sexual activity: Defer        Past Medical History:   Diagnosis Date   • Anemia    • AVM (arteriovenous malformation)     cecal AVM   • Hyperlipidemia    • Hypertension    • Lung disease         Immunization History   Administered Date(s) Administered   • COVID-19 (PFIZER) 04/05/2021, 04/26/2021   • Flu Vaccine Intradermal Quad 18-64YR 10/07/2020   • Flu Vaccine Quad PF >36MO 10/01/2018,  "09/30/2019   • Influenza, Unspecified 09/01/2019   • Pneumococcal Polysaccharide (PPSV23) 08/25/2021       Allergies   Allergen Reactions   • Penicillins Unknown - Low Severity          Current Outpatient Medications:   •  albuterol sulfate HFA (Ventolin HFA) 108 (90 Base) MCG/ACT inhaler, Ventolin HFA 90 mcg/actuation inhalation HFA aerosol inhaler inhale 1 puff (90 mcg) by inhalation route every 4-6 hours as needed   Active, Disp: , Rfl:   •  atorvastatin (LIPITOR) 40 MG tablet, , Disp: , Rfl:   •  budesonide-formoterol (SYMBICORT) 160-4.5 MCG/ACT inhaler, INHALE TWO PUFFS BY MOUTH TWICE A DAY, Disp: 10.2 g, Rfl: 0  •  tamsulosin (FLOMAX) 0.4 MG capsule 24 hr capsule, Take 1 capsule by mouth Daily for 90 days., Disp: 90 capsule, Rfl: 3  •  vitamin D (ERGOCALCIFEROL) 1.25 MG (63443 UT) capsule capsule, Take 1 capsule by mouth Every 7 (Seven) Days for 15 doses., Disp: 5 capsule, Rfl: 2  •  atorvastatin (LIPITOR) 20 MG tablet, atorvastatin 20 mg oral tablet take 1 tablet (20 mg) by oral route once daily   Active, Disp: , Rfl:   No current facility-administered medications for this visit.         Physical Exam  Vital Signs Reviewed  WDWN, Alert, NAD.    HEENT:  PERRL, EOMI.  OP, nares clear, no sinus tenderness  Neck:  Supple, no JVD, no thyromegaly  Chest: mildly decreased breath sounds throughout. No wheezes, rales, or rhonchi appreciated.  Normal work of breathing noted.  Patient is able speak full sentences without difficulty.  CV: RRR, no MGR, pulses 2+, equal.  Abd:  Soft, NT, ND, + BS, no HSM  EXT:  no clubbing, no cyanosis, no edema, no joint tenderness  Neuro:  A&Ox3, CN grossly intact, no focal deficits.  Skin: No rashes or lesions noted.      Vital Signs:   /62   Pulse 68   Temp 98.6 °F (37 °C) (Temporal)   Resp 16   Ht 180.3 cm (71\")   Wt 74.8 kg (165 lb)   SpO2 98% Comment: room air  BMI 23.01 kg/m²         Result Review :   I personally viewed our last office notes.  Appreciate the " cardiologist note.  Chest he was also personally reviewed showing stable changes from previous.  Labs reviewed showing chronic peripheral eosinophilia but no evidence of chronic hypercapnic respiratory failure.  Of note, the patient's EKG was also grossly unremarkable.       Assessment and Plan      Assessment:  1. Chronic eosinophilic pneumonia, stable.      2. Lipoid pneumonia.      3. Chronic dyspnea.      4. Chronic wheezing.      5. Never smoker.   6.  Iron deficiency anemia/thrombocytopenia patient is under the care of MIKE Lyon   7.  Intermittent chest pain.  No chest pain in the office today per patient report.  Getting stress test per cardiologist  8.  Seasonal allergies/allergic rhinitis.      Plan:  Continue Symbicort 2 puffs twice a day with albuterol as needed  Continue Singulair and Zyrtec  Stress test per cardiologist.  His chest CT was personally reviewed and stable  Lab work-up was grossly unremarkable   Patient reports he is up-to-date with his flu and Covid vaccines.  I will give him a Pneumovax vaccination today.  He will be due for Prevnar when he turns 63   follow-up with hematology as scheduled for iron deficiency anemia/thrombocytopenia.         Follow Up   Return in about 1 year (around 8/25/2022).  Patient was given instructions and counseling regarding his condition or for health maintenance advice. Please see specific information pulled into the AVS if appropriate.     Electronically signed by Pernell Bryant MD, 08/25/21, 12:30 PM EDT.

## 2021-08-27 ENCOUNTER — OFFICE VISIT (OUTPATIENT)
Dept: FAMILY MEDICINE CLINIC | Facility: CLINIC | Age: 61
End: 2021-08-27

## 2021-08-27 VITALS
WEIGHT: 168.8 LBS | OXYGEN SATURATION: 98 % | HEIGHT: 68 IN | BODY MASS INDEX: 25.58 KG/M2 | HEART RATE: 75 BPM | DIASTOLIC BLOOD PRESSURE: 62 MMHG | SYSTOLIC BLOOD PRESSURE: 100 MMHG | TEMPERATURE: 98.2 F

## 2021-08-27 DIAGNOSIS — E55.9 VITAMIN D DEFICIENCY: ICD-10-CM

## 2021-08-27 DIAGNOSIS — D64.9 ANEMIA, UNSPECIFIED TYPE: ICD-10-CM

## 2021-08-27 DIAGNOSIS — E78.2 MIXED HYPERLIPIDEMIA: Primary | ICD-10-CM

## 2021-08-27 LAB
25(OH)D3 SERPL-MCNC: 34.8 NG/ML
ALBUMIN SERPL-MCNC: 4.4 G/DL (ref 3.5–5.2)
ALBUMIN/GLOB SERPL: 1.8 G/DL
ALP SERPL-CCNC: 101 U/L (ref 39–117)
ALT SERPL W P-5'-P-CCNC: 20 U/L (ref 1–41)
ANION GAP SERPL CALCULATED.3IONS-SCNC: 11.8 MMOL/L (ref 5–15)
AST SERPL-CCNC: 19 U/L (ref 1–40)
BASOPHILS # BLD AUTO: 0.02 10*3/MM3 (ref 0–0.2)
BASOPHILS NFR BLD AUTO: 0.4 % (ref 0–1.5)
BILIRUB SERPL-MCNC: 0.8 MG/DL (ref 0–1.2)
BUN SERPL-MCNC: 11 MG/DL (ref 8–23)
BUN/CREAT SERPL: 14.9 (ref 7–25)
CALCIUM SPEC-SCNC: 9.3 MG/DL (ref 8.6–10.5)
CHLORIDE SERPL-SCNC: 104 MMOL/L (ref 98–107)
CHOLEST SERPL-MCNC: 154 MG/DL (ref 0–200)
CO2 SERPL-SCNC: 25.2 MMOL/L (ref 22–29)
CREAT SERPL-MCNC: 0.74 MG/DL (ref 0.76–1.27)
DEPRECATED RDW RBC AUTO: 48 FL (ref 37–54)
EOSINOPHIL # BLD AUTO: 0.07 10*3/MM3 (ref 0–0.4)
EOSINOPHIL NFR BLD AUTO: 1.3 % (ref 0.3–6.2)
ERYTHROCYTE [DISTWIDTH] IN BLOOD BY AUTOMATED COUNT: 13.5 % (ref 12.3–15.4)
GFR SERPL CREATININE-BSD FRML MDRD: 107 ML/MIN/1.73
GLOBULIN UR ELPH-MCNC: 2.5 GM/DL
GLUCOSE SERPL-MCNC: 103 MG/DL (ref 65–99)
HCT VFR BLD AUTO: 47.9 % (ref 37.5–51)
HDLC SERPL-MCNC: 41 MG/DL (ref 40–60)
HGB BLD-MCNC: 15.7 G/DL (ref 13–17.7)
IMM GRANULOCYTES # BLD AUTO: 0.02 10*3/MM3 (ref 0–0.05)
IMM GRANULOCYTES NFR BLD AUTO: 0.4 % (ref 0–0.5)
LDLC SERPL CALC-MCNC: 99 MG/DL (ref 0–100)
LDLC/HDLC SERPL: 2.4 {RATIO}
LYMPHOCYTES # BLD AUTO: 2.03 10*3/MM3 (ref 0.7–3.1)
LYMPHOCYTES NFR BLD AUTO: 37.7 % (ref 19.6–45.3)
MCH RBC QN AUTO: 31.4 PG (ref 26.6–33)
MCHC RBC AUTO-ENTMCNC: 32.8 G/DL (ref 31.5–35.7)
MCV RBC AUTO: 95.8 FL (ref 79–97)
MONOCYTES # BLD AUTO: 0.33 10*3/MM3 (ref 0.1–0.9)
MONOCYTES NFR BLD AUTO: 6.1 % (ref 5–12)
NEUTROPHILS NFR BLD AUTO: 2.92 10*3/MM3 (ref 1.7–7)
NEUTROPHILS NFR BLD AUTO: 54.1 % (ref 42.7–76)
NRBC BLD AUTO-RTO: 0 /100 WBC (ref 0–0.2)
PLATELET # BLD AUTO: 120 10*3/MM3 (ref 140–450)
PMV BLD AUTO: 11.4 FL (ref 6–12)
POTASSIUM SERPL-SCNC: 4.5 MMOL/L (ref 3.5–5.2)
PROT SERPL-MCNC: 6.9 G/DL (ref 6–8.5)
RBC # BLD AUTO: 5 10*6/MM3 (ref 4.14–5.8)
SODIUM SERPL-SCNC: 141 MMOL/L (ref 136–145)
TRIGL SERPL-MCNC: 74 MG/DL (ref 0–150)
VLDLC SERPL-MCNC: 14 MG/DL (ref 5–40)
WBC # BLD AUTO: 5.39 10*3/MM3 (ref 3.4–10.8)

## 2021-08-27 PROCEDURE — 80053 COMPREHEN METABOLIC PANEL: CPT | Performed by: FAMILY MEDICINE

## 2021-08-27 PROCEDURE — 99214 OFFICE O/P EST MOD 30 MIN: CPT | Performed by: FAMILY MEDICINE

## 2021-08-27 PROCEDURE — 85025 COMPLETE CBC W/AUTO DIFF WBC: CPT | Performed by: FAMILY MEDICINE

## 2021-08-27 PROCEDURE — 80061 LIPID PANEL: CPT | Performed by: FAMILY MEDICINE

## 2021-08-27 PROCEDURE — 82306 VITAMIN D 25 HYDROXY: CPT | Performed by: FAMILY MEDICINE

## 2021-08-27 RX ORDER — ATORVASTATIN CALCIUM 20 MG/1
20 TABLET, FILM COATED ORAL DAILY
Qty: 30 TABLET | Refills: 5 | Status: SHIPPED | OUTPATIENT
Start: 2021-08-27 | End: 2022-03-04 | Stop reason: SDUPTHER

## 2021-08-27 NOTE — PROGRESS NOTES
"Chief Complaint  Hyperlipidemia (med refills, fasting labs) and Vitamin D Deficiency  The 10-year ASCVD risk score (Vandana DENNY Jr., et al., 2013) is: 6.6%    Values used to calculate the score:      Age: 61 years      Sex: Male      Is Non- : No      Diabetic: No      Tobacco smoker: No      Systolic Blood Pressure: 100 mmHg      Is BP treated: No      HDL Cholesterol: 39 mg/dL      Total Cholesterol: 145 mg/dL    Subjective          Andry Harper presents to [facility]  Hyperlipidemia  This is a chronic problem. The current episode started more than 1 year ago. The problem is controlled. Recent lipid tests were reviewed and are variable. There are no known factors aggravating his hyperlipidemia. Pertinent negatives include no chest pain, focal sensory loss, focal weakness, leg pain, myalgias or shortness of breath. Current antihyperlipidemic treatment includes statins. The current treatment provides significant improvement of lipids. There are no compliance problems.  Risk factors for coronary artery disease include dyslipidemia, male sex and family history.       Objective   Allergies   Allergen Reactions   • Penicillins Unknown - Low Severity     Immunization History   Administered Date(s) Administered   • COVID-19 (PFIZER) 04/05/2021, 04/26/2021   • Flu Vaccine Intradermal Quad 18-64YR 10/07/2020   • Flu Vaccine Quad PF >36MO 10/01/2018, 09/30/2019   • Influenza, Unspecified 09/01/2019   • Pneumococcal Polysaccharide (PPSV23) 08/25/2021       Vital Signs:   Vitals:    08/27/21 1017   BP: 100/62   Pulse: 75   Temp: 98.2 °F (36.8 °C)   SpO2: 98%   Weight: 76.6 kg (168 lb 12.8 oz)   Height: 172.7 cm (68\")       Physical Exam  Vitals reviewed.   Constitutional:       Appearance: Normal appearance. He is well-developed.   HENT:      Head: Normocephalic and atraumatic.      Right Ear: External ear normal.      Left Ear: External ear normal.      Mouth/Throat:    "   Pharynx: No oropharyngeal exudate.   Eyes:      Conjunctiva/sclera: Conjunctivae normal.      Pupils: Pupils are equal, round, and reactive to light.   Cardiovascular:      Rate and Rhythm: Normal rate and regular rhythm.      Pulses: Normal pulses.      Heart sounds: Normal heart sounds. No murmur heard.   No friction rub. No gallop.    Pulmonary:      Effort: Pulmonary effort is normal.      Breath sounds: Normal breath sounds. No wheezing or rhonchi.   Abdominal:      General: Bowel sounds are normal. There is no distension.      Palpations: Abdomen is soft.      Tenderness: There is no abdominal tenderness.   Skin:     General: Skin is warm and dry.   Neurological:      Mental Status: He is alert and oriented to person, place, and time.      Cranial Nerves: No cranial nerve deficit.   Psychiatric:         Mood and Affect: Mood and affect normal.         Behavior: Behavior normal.         Thought Content: Thought content normal.         Judgment: Judgment normal.        Result Review :   The following data was reviewed by: Keenan Craft MD on 08/27/2021:  CMP    CMP 2/27/21 6/14/21 7/10/21   Glucose  90 114 (A)   Glucose 95     BUN 12 12 11   Creatinine 0.79 1.01 0.75 (A)   eGFR Non African Am  75 105   Sodium 140 140 140   Potassium 4.5 4.3 4.3   Chloride 105 103 104   Calcium 9.0 9.3 9.2   Albumin 3.8 4.00 4.40   Total Bilirubin 0.86 0.4 1.0   Alkaline Phosphatase 103 91 102   AST (SGOT) 19 21 24   ALT (SGPT) 16 19 21   (A) Abnormal value       Comments are available for some flowsheets but are not being displayed.           CBC    CBC 3/13/21 7/10/21 8/4/21   WBC 6.75 4.64 5.95   RBC 4.57 (A) 4.85 4.58   Hemoglobin 14.3 15.4 14.5   Hematocrit 44.3 45.1 43.7   MCV 96.9 (A) 93.0 95.4   MCH 31.3 (A) 31.8 31.7   MCHC 32.3 (A) 34.1 33.2   RDW 13.5 13.3 14.0   Platelets 132 94 (A) 112 (A)   (A) Abnormal value       Comments are available for some flowsheets but are not being displayed.           Lipid Panel     Lipid Panel 2/27/21   Total Cholesterol 145   Triglycerides 71   HDL Cholesterol 39 (A)   VLDL Cholesterol 14   LDL Cholesterol  92   (A) Abnormal value       Comments are available for some flowsheets but are not being displayed.                     Assessment and Plan    Diagnoses and all orders for this visit:    1. Mixed hyperlipidemia (Primary)  -     atorvastatin (LIPITOR) 20 MG tablet; Take 1 tablet by mouth Daily.  Dispense: 30 tablet; Refill: 5  -     Comprehensive Metabolic Panel  -     Lipid Panel    2. Vitamin D deficiency  -     Vitamin D 25 Hydroxy    3. Anemia, unspecified type  -     CBC Auto Differential            Follow Up   Return in about 6 months (around 2/27/2022) for Recheck.  Patient was given instructions and counseling regarding his condition or for health maintenance advice. Please see specific information pulled into the AVS if appropriate.

## 2021-08-27 NOTE — PROGRESS NOTES
Venipuncture Blood Specimen Collection  Venipuncture performed in left arm by Iram Olivia with good hemostasis. Patient tolerated the procedure well without complications.   08/27/21   Iram Olivia

## 2021-08-28 NOTE — PROGRESS NOTES
Labs show no acute changes.  Vitamin D is normal, so no vitamin D needs to be taken.  Follow-up as needed.

## 2021-09-07 ENCOUNTER — HOSPITAL ENCOUNTER (OUTPATIENT)
Dept: NUCLEAR MEDICINE | Facility: HOSPITAL | Age: 61
Discharge: HOME | End: 2021-09-07

## 2021-09-07 DIAGNOSIS — R07.2 PRECORDIAL PAIN: ICD-10-CM

## 2021-09-07 DIAGNOSIS — R06.09 DYSPNEA ON EXERTION: ICD-10-CM

## 2021-09-07 LAB
BH CV IMMEDIATE POST RECOVERY TECH DATA SYMPTOMS: NORMAL
BH CV IMMEDIATE POST TECH DATA BLOOD PRESSURE: NORMAL MMHG
BH CV IMMEDIATE POST TECH DATA HEART RATE: 90 BPM
BH CV IMMEDIATE POST TECH DATA OXYGEN SATS: 98 %
BH CV REST NUCLEAR ISOTOPE DOSE: 10.1 MCI
BH CV SIX MINUTE RECOVERY TECH DATA BLOOD PRESSURE: NORMAL
BH CV SIX MINUTE RECOVERY TECH DATA HEART RATE: 75 BPM
BH CV SIX MINUTE RECOVERY TECH DATA OXYGEN SATURATION: 98 %
BH CV SIX MINUTE RECOVERY TECH DATA SYMPTOMS: NORMAL
BH CV STRESS BP STAGE 1: NORMAL
BH CV STRESS COMMENTS STAGE 1: NORMAL
BH CV STRESS DOSE REGADENOSON STAGE 1: 0.4
BH CV STRESS DURATION MIN STAGE 1: 0
BH CV STRESS DURATION SEC STAGE 1: 10
BH CV STRESS HR STAGE 1: 58
BH CV STRESS NUCLEAR ISOTOPE DOSE: 38 MCI
BH CV STRESS O2 STAGE 1: 95
BH CV STRESS PROTOCOL 1: NORMAL
BH CV STRESS RECOVERY BP: NORMAL MMHG
BH CV STRESS RECOVERY HR: 75 BPM
BH CV STRESS RECOVERY O2: 98 %
BH CV STRESS STAGE 1: 1
BH CV THREE MINUTE POST TECH DATA BLOOD PRESSURE: NORMAL MMHG
BH CV THREE MINUTE POST TECH DATA HEART RATE: 78 BPM
BH CV THREE MINUTE POST TECH DATA OXYGEN SATURATION: 98 %
LV EF NUC BP: 56 %
MAXIMAL PREDICTED HEART RATE: 157 BPM
PERCENT MAX PREDICTED HR: 57.32 %
STRESS BASELINE BP: NORMAL MMHG
STRESS BASELINE HR: 59 BPM
STRESS O2 SAT REST: 97 %
STRESS PERCENT HR: 67 %
STRESS POST O2 SAT PEAK: 97 %
STRESS POST PEAK BP: NORMAL MMHG
STRESS POST PEAK HR: 90 BPM
STRESS TARGET HR: 133 BPM

## 2021-09-07 PROCEDURE — 0 TECHNETIUM TETROFOSMIN KIT: Performed by: INTERNAL MEDICINE

## 2021-09-07 PROCEDURE — A9502 TC99M TETROFOSMIN: HCPCS | Performed by: INTERNAL MEDICINE

## 2021-09-07 PROCEDURE — 93018 CV STRESS TEST I&R ONLY: CPT | Performed by: INTERNAL MEDICINE

## 2021-09-07 PROCEDURE — 25010000002 REGADENOSON 0.4 MG/5ML SOLUTION

## 2021-09-07 PROCEDURE — 78452 HT MUSCLE IMAGE SPECT MULT: CPT | Performed by: INTERNAL MEDICINE

## 2021-09-07 PROCEDURE — 93017 CV STRESS TEST TRACING ONLY: CPT

## 2021-09-07 PROCEDURE — 93016 CV STRESS TEST SUPVJ ONLY: CPT | Performed by: NURSE PRACTITIONER

## 2021-09-07 PROCEDURE — 78452 HT MUSCLE IMAGE SPECT MULT: CPT

## 2021-09-07 RX ADMIN — REGADENOSON 0.4 MG: 0.08 INJECTION, SOLUTION INTRAVENOUS at 10:09

## 2021-09-07 RX ADMIN — TETROFOSMIN 1 DOSE: 1.38 INJECTION, POWDER, LYOPHILIZED, FOR SOLUTION INTRAVENOUS at 09:21

## 2021-09-07 RX ADMIN — TETROFOSMIN 1 DOSE: 1.38 INJECTION, POWDER, LYOPHILIZED, FOR SOLUTION INTRAVENOUS at 10:09

## 2021-09-08 ENCOUNTER — TELEPHONE (OUTPATIENT)
Dept: CARDIOLOGY | Facility: CLINIC | Age: 61
End: 2021-09-08

## 2021-09-08 NOTE — TELEPHONE ENCOUNTER
----- Message from Michelle Leigh RN sent at 9/8/2021  7:56 AM EDT -----    ----- Message -----  From: NANCY Smith MD  Sent: 9/7/2021   9:03 PM EDT  To: Michelle Leigh RN    Echo reviewed  Heart function was normal  Valve function was normal    SPECT pending    BD

## 2021-09-14 RX ORDER — BUDESONIDE AND FORMOTEROL FUMARATE DIHYDRATE 160; 4.5 UG/1; UG/1
AEROSOL RESPIRATORY (INHALATION)
Qty: 10.2 G | Refills: 0 | Status: SHIPPED | OUTPATIENT
Start: 2021-09-14 | End: 2021-10-25

## 2021-10-25 RX ORDER — BUDESONIDE AND FORMOTEROL FUMARATE DIHYDRATE 160; 4.5 UG/1; UG/1
AEROSOL RESPIRATORY (INHALATION)
Qty: 10.2 G | Refills: 0 | Status: SHIPPED | OUTPATIENT
Start: 2021-10-25 | End: 2022-02-01

## 2021-12-13 ENCOUNTER — LAB (OUTPATIENT)
Dept: LAB | Facility: HOSPITAL | Age: 61
End: 2021-12-13

## 2021-12-13 DIAGNOSIS — R97.20 ELEVATED PROSTATE SPECIFIC ANTIGEN (PSA): ICD-10-CM

## 2021-12-13 LAB — PSA SERPL-MCNC: 9.12 NG/ML (ref 0–4)

## 2021-12-13 PROCEDURE — 36415 COLL VENOUS BLD VENIPUNCTURE: CPT

## 2021-12-13 PROCEDURE — 84153 ASSAY OF PSA TOTAL: CPT

## 2021-12-16 RX ORDER — TAMSULOSIN HYDROCHLORIDE 0.4 MG/1
1 CAPSULE ORAL DAILY
COMMUNITY
Start: 2021-12-11 | End: 2021-12-20 | Stop reason: SDUPTHER

## 2021-12-20 ENCOUNTER — OFFICE VISIT (OUTPATIENT)
Dept: UROLOGY | Facility: CLINIC | Age: 61
End: 2021-12-20

## 2021-12-20 VITALS
HEART RATE: 62 BPM | DIASTOLIC BLOOD PRESSURE: 70 MMHG | SYSTOLIC BLOOD PRESSURE: 140 MMHG | WEIGHT: 173.8 LBS | HEIGHT: 71 IN | BODY MASS INDEX: 24.33 KG/M2

## 2021-12-20 DIAGNOSIS — R97.20 ELEVATED PROSTATE SPECIFIC ANTIGEN (PSA): Primary | ICD-10-CM

## 2021-12-20 DIAGNOSIS — N40.1 BPH WITH OBSTRUCTION/LOWER URINARY TRACT SYMPTOMS: ICD-10-CM

## 2021-12-20 DIAGNOSIS — N13.8 BPH WITH OBSTRUCTION/LOWER URINARY TRACT SYMPTOMS: ICD-10-CM

## 2021-12-20 LAB
BILIRUB BLD-MCNC: NEGATIVE MG/DL
CLARITY, POC: CLEAR
COLOR UR: YELLOW
EXPIRATION DATE: NORMAL
GLUCOSE UR STRIP-MCNC: NEGATIVE MG/DL
KETONES UR QL: NEGATIVE
LEUKOCYTE EST, POC: NEGATIVE
Lab: NORMAL
NITRITE UR-MCNC: NEGATIVE MG/ML
PH UR: 5.5 [PH] (ref 5–8)
PROT UR STRIP-MCNC: NEGATIVE MG/DL
RBC # UR STRIP: NEGATIVE /UL
SP GR UR: 1.02 (ref 1–1.03)
UROBILINOGEN UR QL: NORMAL

## 2021-12-20 PROCEDURE — 81003 URINALYSIS AUTO W/O SCOPE: CPT | Performed by: UROLOGY

## 2021-12-20 PROCEDURE — 99214 OFFICE O/P EST MOD 30 MIN: CPT | Performed by: UROLOGY

## 2021-12-20 RX ORDER — TAMSULOSIN HYDROCHLORIDE 0.4 MG/1
2 CAPSULE ORAL DAILY
Qty: 180 CAPSULE | Refills: 3 | Status: SHIPPED | OUTPATIENT
Start: 2021-12-20 | End: 2022-12-15

## 2021-12-21 NOTE — PROGRESS NOTES
Chief Complaint  Follow-up (elevated psa)    Subjective          Andry Harper presents to [facility]     The patient has consented to being recorded using RANDI.    History of Present Illness   6/4/19 - Patient presents in follow up. He is doing well. He denies bothersome urinary issues. He has not had a recent PSA.     7/22/19 - Patient presents in follow up. He is doing well. He has no urinary issues. He had an MRI of the prostate and this showed no areas suggestive of clinically significant prostate cancer. The volume of the gland was 77ml. The patient did have a recent PSA and this was 12. This was increased from 6 at the time of his biopsy. I have recommended following this.     12/18/2019: Patient is here for follow-up of his elevated PSA.  It was recently checked and is found to be 7.5 at this point.  That is down from 12.  It was at 6 at the time of his prostate biopsy which was negative.  He is also had an MRI of his prostate which showed no areas suggestive of clinically significant prostate cancer.     6/10/20:  He is having more issues with frequency and urgency.  He states he has a slow stream at times.  He would like to try medicine for that.  His most recent PSA was 8 which is down from a high of 12 but up from last check at 6.  He had a negative prostate MRI but that was over a year ago.       12/9/20:  His most recent PSA in Nov 2020 is now 7.71 which is down for him.  He had a negative prostate MRI in 2019 and again in June 2020.  He had a negative prostate biopsy in the past when his PSA was 6.  It has been as high as 12 in the past.     6/22/21:  Most recent PSA is now 10 in June 2021.  This is similar to his higher PSAs in the past.  His last prostate MRI was in June 2020 and was negative.  He has had two negative prostate MRIs.  He has also had a negative prostate biopsy in the past.     12/20/21: Most recent PSA from 12/13/2021 was 9.120, which is actually down a  "little bit from when he was seen in 06/2021, but is similar to his baseline, which he normally runs between 7 and 9. He inquires about making any changes. The patient reports occasional nocturia. He denies any changes in his flow of urine. He reports the amount he urinates varies. The patient inquires about the Flomax, changing doses and his PSA count. He notes decreased times of urination at night.    Objective   Vital Signs:   /70   Pulse 62   Ht 180.3 cm (71\")   Wt 78.8 kg (173 lb 12.8 oz)   BMI 24.24 kg/m²       Physical Exam  Vitals and nursing note reviewed.   Constitutional:       Appearance: Normal appearance. He is well-developed.   Pulmonary:      Effort: Pulmonary effort is normal.      Breath sounds: Normal air entry.   Neurological:      Mental Status: He is alert and oriented to person, place, and time.      Motor: Motor function is intact.   Psychiatric:         Mood and Affect: Mood normal.         Behavior: Behavior normal.        Result Review :                  Results for orders placed or performed in visit on 12/20/21   POC Urinalysis Dipstick, Automated    Specimen: Urine   Result Value Ref Range    Color Yellow Yellow, Straw, Dark Yellow, Nicole    Clarity, UA Clear Clear    Specific Gravity  1.025 1.005 - 1.030    pH, Urine 5.5 5.0 - 8.0    Leukocytes Negative Negative    Nitrite, UA Negative Negative    Protein, POC Negative Negative mg/dL    Glucose, UA Negative Negative, 1000 mg/dL (3+) mg/dL    Ketones, UA Negative Negative    Urobilinogen, UA Normal Normal    Bilirubin Negative Negative    Blood, UA Negative Negative    Lot Number [id number]     Expiration Date 12/2,022        Assessment and Plan    Diagnoses and all orders for this visit:    1. Elevated prostate specific antigen (PSA) (Primary)  -     POC Urinalysis Dipstick, Automated  -     PSA DIAGNOSTIC; Future  - Follow-up with PSA in 6 months. If still elevated, we will likely repeat his MRI at that time.    2. BPH with " obstruction/lower urinary tract symptoms  -     tamsulosin (FLOMAX) 0.4 MG capsule 24 hr capsule; Take 2 capsules by mouth Daily for 360 days.  Dispense: 180 capsule; Refill: 3  - We are going to increase his Flomax to 2 a day and we will see him back in 6 months. If that has not been effective, we will likely go ahead and start him on finasteride.        Follow Up   Return in about 6 months (around 6/20/2022) for PSA prior to visit, PVR check.  Patient was given instructions and counseling regarding his condition or for health maintenance advice. Please see specific information pulled into the AVS if appropriate.     Transcribed from ambient dictation for Michelle Hightower MD by Alyssa Dunaway.  12/20/21   23:52 EST    Patient verbalized consent to the visit recording.  I have personally performed the services described in this document as transcribed by the above individual, and it is both accurate and complete.  Michelle Hightower MD  12/21/2021  15:09 EST

## 2022-01-05 ENCOUNTER — HOSPITAL ENCOUNTER (OUTPATIENT)
Dept: CT IMAGING | Facility: HOSPITAL | Age: 62
Discharge: HOME | End: 2022-01-05
Admitting: PHYSICIAN ASSISTANT

## 2022-01-05 DIAGNOSIS — R91.1 LUNG NODULE: ICD-10-CM

## 2022-01-05 PROCEDURE — 71250 CT THORAX DX C-: CPT

## 2022-02-01 ENCOUNTER — LAB (OUTPATIENT)
Dept: LAB | Facility: HOSPITAL | Age: 62
End: 2022-02-01

## 2022-02-01 DIAGNOSIS — R97.20 ELEVATED PROSTATE SPECIFIC ANTIGEN (PSA): ICD-10-CM

## 2022-02-01 DIAGNOSIS — D64.9 ANEMIA, UNSPECIFIED TYPE: ICD-10-CM

## 2022-02-01 LAB
ALBUMIN SERPL-MCNC: 4.4 G/DL (ref 3.5–5.2)
ALBUMIN/GLOB SERPL: 1.8 G/DL
ALP SERPL-CCNC: 84 U/L (ref 39–117)
ALT SERPL W P-5'-P-CCNC: 23 U/L (ref 1–41)
ANION GAP SERPL CALCULATED.3IONS-SCNC: 11 MMOL/L (ref 5–15)
AST SERPL-CCNC: 22 U/L (ref 1–40)
BASOPHILS # BLD AUTO: 0.03 10*3/MM3 (ref 0–0.2)
BASOPHILS NFR BLD AUTO: 0.4 % (ref 0–1.5)
BILIRUB SERPL-MCNC: 0.7 MG/DL (ref 0–1.2)
BUN SERPL-MCNC: 17 MG/DL (ref 8–23)
BUN/CREAT SERPL: 21 (ref 7–25)
CALCIUM SPEC-SCNC: 9.4 MG/DL (ref 8.6–10.5)
CHLORIDE SERPL-SCNC: 102 MMOL/L (ref 98–107)
CO2 SERPL-SCNC: 25 MMOL/L (ref 22–29)
CREAT SERPL-MCNC: 0.81 MG/DL (ref 0.76–1.27)
DEPRECATED RDW RBC AUTO: 44.2 FL (ref 37–54)
EOSINOPHIL # BLD AUTO: 0.14 10*3/MM3 (ref 0–0.4)
EOSINOPHIL NFR BLD AUTO: 2 % (ref 0.3–6.2)
ERYTHROCYTE [DISTWIDTH] IN BLOOD BY AUTOMATED COUNT: 13.1 % (ref 12.3–15.4)
FERRITIN SERPL-MCNC: 540 NG/ML (ref 30–400)
GFR SERPL CREATININE-BSD FRML MDRD: 96 ML/MIN/1.73
GLOBULIN UR ELPH-MCNC: 2.5 GM/DL
GLUCOSE SERPL-MCNC: 95 MG/DL (ref 65–99)
HCT VFR BLD AUTO: 45.3 % (ref 37.5–51)
HGB BLD-MCNC: 15.1 G/DL (ref 13–17.7)
IMM GRANULOCYTES # BLD AUTO: 0.03 10*3/MM3 (ref 0–0.05)
IMM GRANULOCYTES NFR BLD AUTO: 0.4 % (ref 0–0.5)
IRON 24H UR-MRATE: 68 MCG/DL (ref 59–158)
IRON SATN MFR SERPL: 18 % (ref 20–50)
LYMPHOCYTES # BLD AUTO: 2.57 10*3/MM3 (ref 0.7–3.1)
LYMPHOCYTES NFR BLD AUTO: 37.5 % (ref 19.6–45.3)
MCH RBC QN AUTO: 31.1 PG (ref 26.6–33)
MCHC RBC AUTO-ENTMCNC: 33.3 G/DL (ref 31.5–35.7)
MCV RBC AUTO: 93.4 FL (ref 79–97)
MONOCYTES # BLD AUTO: 0.46 10*3/MM3 (ref 0.1–0.9)
MONOCYTES NFR BLD AUTO: 6.7 % (ref 5–12)
NEUTROPHILS NFR BLD AUTO: 3.62 10*3/MM3 (ref 1.7–7)
NEUTROPHILS NFR BLD AUTO: 53 % (ref 42.7–76)
NRBC BLD AUTO-RTO: 0 /100 WBC (ref 0–0.2)
PLATELET # BLD AUTO: 117 10*3/MM3 (ref 140–450)
PMV BLD AUTO: 11.1 FL (ref 6–12)
POTASSIUM SERPL-SCNC: 4.2 MMOL/L (ref 3.5–5.2)
PROT SERPL-MCNC: 6.9 G/DL (ref 6–8.5)
PSA SERPL-MCNC: 9.23 NG/ML (ref 0–4)
RBC # BLD AUTO: 4.85 10*6/MM3 (ref 4.14–5.8)
SODIUM SERPL-SCNC: 138 MMOL/L (ref 136–145)
TIBC SERPL-MCNC: 374 MCG/DL (ref 298–536)
TRANSFERRIN SERPL-MCNC: 251 MG/DL (ref 200–360)
WBC NRBC COR # BLD: 6.85 10*3/MM3 (ref 3.4–10.8)

## 2022-02-01 PROCEDURE — 85025 COMPLETE CBC W/AUTO DIFF WBC: CPT

## 2022-02-01 PROCEDURE — 84466 ASSAY OF TRANSFERRIN: CPT

## 2022-02-01 PROCEDURE — 83540 ASSAY OF IRON: CPT

## 2022-02-01 PROCEDURE — 36415 COLL VENOUS BLD VENIPUNCTURE: CPT

## 2022-02-01 PROCEDURE — 82728 ASSAY OF FERRITIN: CPT

## 2022-02-01 PROCEDURE — 80053 COMPREHEN METABOLIC PANEL: CPT

## 2022-02-01 PROCEDURE — 84153 ASSAY OF PSA TOTAL: CPT

## 2022-02-01 RX ORDER — BUDESONIDE AND FORMOTEROL FUMARATE DIHYDRATE 160; 4.5 UG/1; UG/1
AEROSOL RESPIRATORY (INHALATION)
Qty: 10.2 G | Refills: 0 | Status: SHIPPED | OUTPATIENT
Start: 2022-02-01 | End: 2022-06-14

## 2022-02-08 ENCOUNTER — OFFICE VISIT (OUTPATIENT)
Dept: ONCOLOGY | Facility: HOSPITAL | Age: 62
End: 2022-02-08

## 2022-02-08 VITALS
DIASTOLIC BLOOD PRESSURE: 62 MMHG | RESPIRATION RATE: 18 BRPM | HEART RATE: 87 BPM | TEMPERATURE: 97.8 F | OXYGEN SATURATION: 98 % | WEIGHT: 174.16 LBS | BODY MASS INDEX: 24.29 KG/M2 | SYSTOLIC BLOOD PRESSURE: 136 MMHG

## 2022-02-08 DIAGNOSIS — D64.9 ANEMIA, UNSPECIFIED TYPE: Primary | ICD-10-CM

## 2022-02-08 PROCEDURE — 99213 OFFICE O/P EST LOW 20 MIN: CPT | Performed by: NURSE PRACTITIONER

## 2022-02-08 PROCEDURE — G0463 HOSPITAL OUTPT CLINIC VISIT: HCPCS | Performed by: NURSE PRACTITIONER

## 2022-02-08 NOTE — PROGRESS NOTES
Chief Complaint  Anemia    Venancio, MD Venancio Pa, Keenan Estrada MD      Subjective          Andry Harper presents to [facility] for follow up anemia.     History of Present Illness     Mr. Andry Harper presents for 6 months follow up. 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 He follows with Dr. Hightower for this. His PSA have been stable.     Labs completed on 2/1/22: CMP is normal. CBC is stable. H/H is normal. Iron studies are stable. Denies any GI blood loss.           Oncology/Hematology History    No history exists.   Mr. Harper is a well known patient to [facility] for previous evaluation of     thrombocytopenia which resolved.  Most recently found to have iron deficiency.      In October 2018 Iron was 43  TIBC 347 and Percent saturation 12.  H/H was 14.9 /    43.2 MCV 89.      He underwent EGD and colonoscopy 10/26/2018.  Colonoscopy with 2 polyps and a     cecal AVM was noted and bleeding this was cauterized. EGD revealed a hiatal her    misha.              In December 2018 Iron was 59  TIBC 405 and Percent saturation 19 with H/H     13.9/40.4 and Platelet count 150,000.  This was repeated again in January 2019     with Iron 50 TIBC 383 Percent sat 15  H/H 14.8/42 and Platelet count 157,000.      He started on oral iron 2 months ago.  Denies hematemesis, melena and BRB with     BMs.  He denies fatigue and pagophagia.              Review of Systems   Constitutional: Positive for fatigue. Negative for appetite change, diaphoresis, fever, unexpected weight gain and unexpected weight loss.   HENT: Negative for hearing loss, sore throat and voice change.    Eyes: Negative for blurred vision, double vision, pain, redness and visual disturbance.   Respiratory: Positive for shortness of breath. Negative for cough and wheezing.    Cardiovascular: Negative for chest pain, palpitations and leg swelling.   Endocrine: Negative for cold  intolerance, heat intolerance, polydipsia and polyuria.   Genitourinary: Negative for decreased urine volume, difficulty urinating, frequency and urinary incontinence.   Musculoskeletal: Negative for arthralgias, back pain, joint swelling and myalgias.   Skin: Negative for color change, rash, skin lesions and wound.   Neurological: Negative for dizziness, seizures, numbness and headache.   Hematological: Negative for adenopathy. Does not bruise/bleed easily.   Psychiatric/Behavioral: Negative for depressed mood. The patient is not nervous/anxious.    All other systems reviewed and are negative.      Current Outpatient Medications on File Prior to Visit   Medication Sig Dispense Refill   • albuterol sulfate HFA (Ventolin HFA) 108 (90 Base) MCG/ACT inhaler Ventolin HFA 90 mcg/actuation inhalation HFA aerosol inhaler inhale 1 puff (90 mcg) by inhalation route every 4-6 hours as needed   Active     • atorvastatin (LIPITOR) 20 MG tablet Take 1 tablet by mouth Daily. 30 tablet 5   • budesonide-formoterol (SYMBICORT) 160-4.5 MCG/ACT inhaler INHALE TWO PUFFS BY MOUTH TWICE A DAY 10.2 g 0   • tamsulosin (FLOMAX) 0.4 MG capsule 24 hr capsule Take 2 capsules by mouth Daily for 360 days. 180 capsule 3   • vitamin D (ERGOCALCIFEROL) 1.25 MG (65396 UT) capsule capsule Take 1 capsule by mouth Every 7 (Seven) Days for 15 doses. 5 capsule 2     No current facility-administered medications on file prior to visit.       Allergies   Allergen Reactions   • Penicillins Unknown - Low Severity     Past Medical History:   Diagnosis Date   • Anemia    • AVM (arteriovenous malformation)     cecal AVM   • Hyperlipidemia    • Hypertension    • Lung disease      Past Surgical History:   Procedure Laterality Date   • COLONOSCOPY     • LUNG BIOPSY Right      Social History     Socioeconomic History   • Marital status: Single   Tobacco Use   • Smoking status: Never Smoker   • Smokeless tobacco: Never Used   Vaping Use   • Vaping Use: Never used    Substance and Sexual Activity   • Alcohol use: Not Currently   • Drug use: Never   • Sexual activity: Defer     Family History   Problem Relation Age of Onset   • Lymphoma Father    • Cancer Father    • Stroke Mother    • Heart disease Brother    • Heart attack Brother    • Stroke Brother    • Diabetes Brother    • Cancer Brother    • Kidney disease Brother      Immunization History   Administered Date(s) Administered   • COVID-19 (PFIZER) PURPLE CAP 04/05/2021, 04/26/2021   • Flu Vaccine Intradermal Quad 18-64YR 10/07/2020   • Flu Vaccine Quad PF >36MO 10/01/2018, 09/30/2019   • Influenza, Unspecified 11/09/2021   • Pneumococcal Polysaccharide (PPSV23) 08/25/2021       Objective   Physical Exam  Vitals and nursing note reviewed.   Constitutional:       Appearance: Normal appearance. He is normal weight.   HENT:      Head: Normocephalic.      Nose: Nose normal.      Mouth/Throat:      Mouth: Mucous membranes are moist.   Eyes:      Pupils: Pupils are equal, round, and reactive to light.   Cardiovascular:      Rate and Rhythm: Normal rate and regular rhythm.      Pulses: Normal pulses.   Pulmonary:      Effort: Pulmonary effort is normal.      Breath sounds: Normal breath sounds.   Abdominal:      Palpations: Abdomen is soft.   Musculoskeletal:         General: Normal range of motion.      Cervical back: Normal range of motion and neck supple.   Skin:     General: Skin is warm and dry.   Neurological:      Mental Status: He is alert and oriented to person, place, and time.   Psychiatric:         Mood and Affect: Mood normal.         Behavior: Behavior normal.         Thought Content: Thought content normal.         Judgment: Judgment normal.         Vitals:    02/08/22 1511   BP: 136/62   Pulse: 87   Resp: 18   Temp: 97.8 °F (36.6 °C)   SpO2: 98%   Weight: 79 kg (174 lb 2.6 oz)   PainSc: 0-No pain     ECOG score: 0         ECOG: (0) Fully Active - Able to Carry On All Pre-disease Performance Without  Restriction  Fall Risk Assessment was completed, and patient is at low risk for falls.  PHQ-9 Total Score: 0       The patient is  experiencing fatigue. Fatigue score: 2    PT/OT Functional Screening: PT fx screen: No needs identified  Speech Functional Screening: Speech fx screen: No needs identified  Rehab to be ordered: Rehab to be ordered: No needs identified        Result Review :   The following data was reviewed by: MIKE Greenwood on 02/08/2022:  Lab Results   Component Value Date    HGB 15.1 02/01/2022    HCT 45.3 02/01/2022    MCV 93.4 02/01/2022     (L) 02/01/2022    WBC 6.85 02/01/2022    NEUTROABS 3.62 02/01/2022    LYMPHSABS 2.57 02/01/2022    MONOSABS 0.46 02/01/2022    EOSABS 0.14 02/01/2022    BASOSABS 0.03 02/01/2022     Lab Results   Component Value Date    GLUCOSE 95 02/01/2022    BUN 17 02/01/2022    CREATININE 0.81 02/01/2022     02/01/2022    K 4.2 02/01/2022     02/01/2022    CO2 25.0 02/01/2022    CALCIUM 9.4 02/01/2022    PROTEINTOT 6.9 02/01/2022    ALBUMIN 4.40 02/01/2022    BILITOT 0.7 02/01/2022    ALKPHOS 84 02/01/2022    AST 22 02/01/2022    ALT 23 02/01/2022          Assessment and Plan    Diagnoses and all orders for this visit:    1. Anemia, unspecified type (Primary)  -     CBC & Differential; Future  -     Iron Profile; Future  -     Ferritin; Future  -     Folate; Future  -     Vitamin B12; Future    no need for any oral iron or iron infusions at this time. He is not taking the Vitamin D any longer. He took for 2 months after Vitamin D was low.     We will see in 1 year with labs 1-2 days prior with CBC, iron panel, ferritin, folate, B-12.         Patient Follow Up: 1 year with NP.     Patient was given instructions and counseling regarding his condition or for health maintenance advice. Please see specific information pulled into the AVS if appropriate.     MIKE Greenwood    2/8/2022

## 2022-03-01 DIAGNOSIS — E78.2 MIXED HYPERLIPIDEMIA: ICD-10-CM

## 2022-03-01 RX ORDER — ATORVASTATIN CALCIUM 20 MG/1
TABLET, FILM COATED ORAL
Qty: 30 TABLET | Refills: 5 | OUTPATIENT
Start: 2022-03-01

## 2022-03-04 ENCOUNTER — OFFICE VISIT (OUTPATIENT)
Dept: FAMILY MEDICINE CLINIC | Facility: CLINIC | Age: 62
End: 2022-03-04

## 2022-03-04 VITALS
TEMPERATURE: 98.2 F | SYSTOLIC BLOOD PRESSURE: 118 MMHG | BODY MASS INDEX: 25.08 KG/M2 | DIASTOLIC BLOOD PRESSURE: 76 MMHG | OXYGEN SATURATION: 96 % | HEART RATE: 97 BPM | WEIGHT: 179.8 LBS

## 2022-03-04 DIAGNOSIS — E78.2 MIXED HYPERLIPIDEMIA: ICD-10-CM

## 2022-03-04 DIAGNOSIS — J30.89 NON-SEASONAL ALLERGIC RHINITIS, UNSPECIFIED TRIGGER: ICD-10-CM

## 2022-03-04 DIAGNOSIS — J01.00 ACUTE NON-RECURRENT MAXILLARY SINUSITIS: Primary | ICD-10-CM

## 2022-03-04 PROCEDURE — 99214 OFFICE O/P EST MOD 30 MIN: CPT | Performed by: FAMILY MEDICINE

## 2022-03-04 RX ORDER — CLINDAMYCIN HYDROCHLORIDE 300 MG/1
300 CAPSULE ORAL 4 TIMES DAILY
Qty: 28 CAPSULE | Refills: 0 | Status: SHIPPED | OUTPATIENT
Start: 2022-03-04 | End: 2022-03-11

## 2022-03-04 RX ORDER — SACCHAROMYCES BOULARDII 250 MG
250 CAPSULE ORAL 2 TIMES DAILY
Qty: 20 CAPSULE | Refills: 0 | Status: SHIPPED | OUTPATIENT
Start: 2022-03-04 | End: 2022-03-14

## 2022-03-04 RX ORDER — ATORVASTATIN CALCIUM 20 MG/1
20 TABLET, FILM COATED ORAL DAILY
Qty: 30 TABLET | Refills: 5 | Status: SHIPPED | OUTPATIENT
Start: 2022-03-04 | End: 2022-09-06

## 2022-03-04 RX ORDER — LEVOCETIRIZINE DIHYDROCHLORIDE 5 MG/1
5 TABLET, FILM COATED ORAL EVERY EVENING
Qty: 30 TABLET | Refills: 5 | Status: SHIPPED | OUTPATIENT
Start: 2022-03-04

## 2022-03-04 RX ORDER — FLUTICASONE PROPIONATE 50 MCG
2 SPRAY, SUSPENSION (ML) NASAL DAILY
Qty: 16 G | Refills: 5 | Status: SHIPPED | OUTPATIENT
Start: 2022-03-04 | End: 2023-01-27 | Stop reason: SDUPTHER

## 2022-03-04 NOTE — PROGRESS NOTES
Chief Complaint  Hyperlipidemia (refills)    Subjective          Andry Harper presents to [facility]  Hyperlipidemia  This is a chronic problem. The current episode started more than 1 year ago. The problem is controlled. Recent lipid tests were reviewed and are variable. There are no known factors aggravating his hyperlipidemia. Pertinent negatives include no chest pain, focal sensory loss, focal weakness, leg pain, myalgias or shortness of breath. Current antihyperlipidemic treatment includes statins. The current treatment provides significant improvement of lipids. There are no compliance problems.  Risk factors for coronary artery disease include dyslipidemia and male sex.   URI   This is a new problem. The current episode started in the past 7 days. The problem has been gradually worsening. There has been no fever. Associated symptoms include congestion and sinus pain. Pertinent negatives include no abdominal pain, chest pain, coughing, diarrhea, dysuria, ear pain, headaches, joint pain, joint swelling, nausea, neck pain, plugged ear sensation, rash, rhinorrhea, sneezing, sore throat, swollen glands, vomiting or wheezing. He has tried antihistamine for the symptoms. The treatment provided no relief.       Objective   Allergies   Allergen Reactions   • Penicillins Unknown - Low Severity     Immunization History   Administered Date(s) Administered   • COVID-19 (PFIZER) PURPLE CAP 04/05/2021, 04/26/2021   • Flu Vaccine Intradermal Quad 18-64YR 10/07/2020   • Flu Vaccine Quad PF >36MO 10/01/2018, 09/30/2019   • Influenza, Unspecified 11/09/2021   • Pneumococcal Polysaccharide (PPSV23) 08/25/2021     Past Medical History:   Diagnosis Date   • Anemia    • AVM (arteriovenous malformation)     cecal AVM   • Hyperlipidemia    • Hypertension    • Lung disease       Past Surgical History:   Procedure Laterality Date   • COLONOSCOPY     • LUNG BIOPSY Right       Social History  [FreeTextEntry1] : A complete skin examination was performed.  There is no evidence of skin cancer.  We discussed the importance of photoprotection, including the use of hats, protective clothing and sunscreens with an SPF of at least 30.  Sun avoidance was also discussed.  The ABCDE's of melanoma was discussed.  Regular skin exams recommended.\par \par DF's - benign.\par \par Varicose telangiectasias\par Education.\par Sclerotherapy discussed at length with patient.  Risks and benefits including hyperpigmentation, ulceration, scarring, phlebitis and thrombus formation discussed.  Patient understands it may require more than one treatment, and they may slowly develop new or additional vessels.  Cosmetic.  CPP/tx.\par      Socioeconomic History   • Marital status: Single   Tobacco Use   • Smoking status: Never Smoker   • Smokeless tobacco: Never Used   Vaping Use   • Vaping Use: Never used   Substance and Sexual Activity   • Alcohol use: Not Currently   • Drug use: Never   • Sexual activity: Defer        Current Outpatient Medications:   •  albuterol sulfate HFA (Ventolin HFA) 108 (90 Base) MCG/ACT inhaler, Ventolin HFA 90 mcg/actuation inhalation HFA aerosol inhaler inhale 1 puff (90 mcg) by inhalation route every 4-6 hours as needed   Active, Disp: , Rfl:   •  atorvastatin (LIPITOR) 20 MG tablet, Take 1 tablet by mouth Daily., Disp: 30 tablet, Rfl: 5  •  budesonide-formoterol (SYMBICORT) 160-4.5 MCG/ACT inhaler, INHALE TWO PUFFS BY MOUTH TWICE A DAY, Disp: 10.2 g, Rfl: 0  •  tamsulosin (FLOMAX) 0.4 MG capsule 24 hr capsule, Take 2 capsules by mouth Daily for 360 days., Disp: 180 capsule, Rfl: 3  •  clindamycin (Cleocin) 300 MG capsule, Take 1 capsule by mouth 4 (Four) Times a Day for 7 days., Disp: 28 capsule, Rfl: 0  •  fluticasone (Flonase) 50 MCG/ACT nasal spray, 2 sprays into the nostril(s) as directed by provider Daily., Disp: 16 g, Rfl: 5  •  levocetirizine (Xyzal) 5 MG tablet, Take 1 tablet by mouth Every Evening., Disp: 30 tablet, Rfl: 5  •  saccharomyces boulardii (Florastor) 250 MG capsule, Take 1 capsule by mouth 2 (Two) Times a Day for 10 days., Disp: 20 capsule, Rfl: 0  •  vitamin D (ERGOCALCIFEROL) 1.25 MG (77489 UT) capsule capsule, Take 1 capsule by mouth Every 7 (Seven) Days for 15 doses., Disp: 5 capsule, Rfl: 2   Family History   Problem Relation Age of Onset   • Lymphoma Father    • Cancer Father    • Stroke Mother    • Heart disease Brother    • Heart attack Brother    • Stroke Brother    • Diabetes Brother    • Cancer Brother    • Kidney disease Brother           Vital Signs:   Vitals:    03/04/22 1537   BP: 118/76   Pulse: 97   Temp: 98.2 °F (36.8 °C)   SpO2: 96%   Weight: 81.6 kg (179 lb 12.8 oz)        Review of Systems   HENT: Positive for congestion and sinus pain. Negative for ear pain, rhinorrhea, sneezing and sore throat.    Respiratory: Negative for cough, shortness of breath and wheezing.    Cardiovascular: Negative for chest pain.   Gastrointestinal: Negative for abdominal pain, diarrhea, nausea and vomiting.   Genitourinary: Negative for dysuria.   Musculoskeletal: Negative for joint pain, myalgias and neck pain.   Skin: Negative for rash.   Neurological: Negative for focal weakness and headaches.      Physical Exam  Vitals reviewed.   Constitutional:       Appearance: Normal appearance. He is well-developed.   HENT:      Head: Normocephalic and atraumatic.      Right Ear: External ear normal.      Left Ear: External ear normal.      Mouth/Throat:      Pharynx: No oropharyngeal exudate.   Eyes:      Conjunctiva/sclera: Conjunctivae normal.      Pupils: Pupils are equal, round, and reactive to light.   Cardiovascular:      Rate and Rhythm: Normal rate and regular rhythm.      Pulses: Normal pulses.      Heart sounds: Normal heart sounds. No murmur heard.  No friction rub. No gallop.    Pulmonary:      Effort: Pulmonary effort is normal.      Breath sounds: Normal breath sounds. No wheezing or rhonchi.   Abdominal:      General: Bowel sounds are normal. There is no distension.      Palpations: Abdomen is soft.      Tenderness: There is no abdominal tenderness.   Skin:     General: Skin is warm and dry.   Neurological:      Mental Status: He is alert and oriented to person, place, and time.      Cranial Nerves: No cranial nerve deficit.   Psychiatric:         Mood and Affect: Mood and affect normal.         Behavior: Behavior normal.         Thought Content: Thought content normal.         Judgment: Judgment normal.        Result Review :     CMP    CMP 7/10/21 8/27/21 2/1/22   Glucose 114 (A) 103 (A) 95   BUN 11 11 17   Creatinine 0.75 (A) 0.74 (A) 0.81   eGFR Non African Am 105 107 96   Sodium 140  141 138   Potassium 4.3 4.5 4.2   Chloride 104 104 102   Calcium 9.2 9.3 9.4   Albumin 4.40 4.40 4.40   Total Bilirubin 1.0 0.8 0.7   Alkaline Phosphatase 102 101 84   AST (SGOT) 24 19 22   ALT (SGPT) 21 20 23   (A) Abnormal value       Comments are available for some flowsheets but are not being displayed.           CBC    CBC 8/4/21 8/27/21 2/1/22   WBC 5.95 5.39 6.85   RBC 4.58 5.00 4.85   Hemoglobin 14.5 15.7 15.1   Hematocrit 43.7 47.9 45.3   MCV 95.4 95.8 93.4   MCH 31.7 31.4 31.1   MCHC 33.2 32.8 33.3   RDW 14.0 13.5 13.1   Platelets 112 (A) 120 (A) 117 (A)   (A) Abnormal value       Comments are available for some flowsheets but are not being displayed.           Lipid Panel    Lipid Panel 8/27/21   Total Cholesterol 154   Triglycerides 74   HDL Cholesterol 41   VLDL Cholesterol 14   LDL Cholesterol  99   LDL/HDL Ratio 2.40                         Assessment and Plan    Diagnoses and all orders for this visit:    1. Acute non-recurrent maxillary sinusitis (Primary)  -     clindamycin (Cleocin) 300 MG capsule; Take 1 capsule by mouth 4 (Four) Times a Day for 7 days.  Dispense: 28 capsule; Refill: 0  -     saccharomyces boulardii (Florastor) 250 MG capsule; Take 1 capsule by mouth 2 (Two) Times a Day for 10 days.  Dispense: 20 capsule; Refill: 0  -     CBC Auto Differential  -     Comprehensive Metabolic Panel    2. Mixed hyperlipidemia  -     atorvastatin (LIPITOR) 20 MG tablet; Take 1 tablet by mouth Daily.  Dispense: 30 tablet; Refill: 5  -     Comprehensive Metabolic Panel  -     Lipid Panel  -     CBC Auto Differential; Future  -     Comprehensive Metabolic Panel; Future  -     Lipid Panel; Future    3. Non-seasonal allergic rhinitis, unspecified trigger  -     fluticasone (Flonase) 50 MCG/ACT nasal spray; 2 sprays into the nostril(s) as directed by provider Daily.  Dispense: 16 g; Refill: 5  -     levocetirizine (Xyzal) 5 MG tablet; Take 1 tablet by mouth Every Evening.  Dispense: 30 tablet; Refill: 5  -      CBC Auto Differential  -     Comprehensive Metabolic Panel  -     CBC Auto Differential; Future  -     Comprehensive Metabolic Panel; Future  -     Lipid Panel; Future            Follow Up   Return in about 6 months (around 9/4/2022), or if symptoms worsen or fail to improve.  Patient was given instructions and counseling regarding his condition or for health maintenance advice. Please see specific information pulled into the AVS if appropriate.

## 2022-03-05 ENCOUNTER — CLINICAL SUPPORT (OUTPATIENT)
Dept: FAMILY MEDICINE CLINIC | Facility: CLINIC | Age: 62
End: 2022-03-05

## 2022-03-05 DIAGNOSIS — D64.9 ANEMIA, UNSPECIFIED TYPE: ICD-10-CM

## 2022-03-05 LAB
ALBUMIN SERPL-MCNC: 4.2 G/DL (ref 3.5–5.2)
ALBUMIN/GLOB SERPL: 1.8 G/DL
ALP SERPL-CCNC: 79 U/L (ref 39–117)
ALT SERPL W P-5'-P-CCNC: 24 U/L (ref 1–41)
ANION GAP SERPL CALCULATED.3IONS-SCNC: 6 MMOL/L (ref 5–15)
AST SERPL-CCNC: 20 U/L (ref 1–40)
BASOPHILS # BLD AUTO: 0.02 10*3/MM3 (ref 0–0.2)
BASOPHILS NFR BLD AUTO: 0.5 % (ref 0–1.5)
BILIRUB SERPL-MCNC: 0.7 MG/DL (ref 0–1.2)
BUN SERPL-MCNC: 13 MG/DL (ref 8–23)
BUN/CREAT SERPL: 20 (ref 7–25)
CALCIUM SPEC-SCNC: 9.1 MG/DL (ref 8.6–10.5)
CHLORIDE SERPL-SCNC: 105 MMOL/L (ref 98–107)
CHOLEST SERPL-MCNC: 135 MG/DL (ref 0–200)
CO2 SERPL-SCNC: 29 MMOL/L (ref 22–29)
CREAT SERPL-MCNC: 0.65 MG/DL (ref 0.76–1.27)
DEPRECATED RDW RBC AUTO: 44.9 FL (ref 37–54)
EGFRCR SERPLBLD CKD-EPI 2021: 105.9 ML/MIN/1.73
EOSINOPHIL # BLD AUTO: 0.13 10*3/MM3 (ref 0–0.4)
EOSINOPHIL NFR BLD AUTO: 3.3 % (ref 0.3–6.2)
ERYTHROCYTE [DISTWIDTH] IN BLOOD BY AUTOMATED COUNT: 13.1 % (ref 12.3–15.4)
FERRITIN SERPL-MCNC: 492.4 NG/ML (ref 30–400)
FOLATE SERPL-MCNC: 14.6 NG/ML (ref 4.78–24.2)
GLOBULIN UR ELPH-MCNC: 2.4 GM/DL
GLUCOSE SERPL-MCNC: 107 MG/DL (ref 65–99)
HCT VFR BLD AUTO: 44.2 % (ref 37.5–51)
HDLC SERPL-MCNC: 40 MG/DL (ref 40–60)
HGB BLD-MCNC: 14.7 G/DL (ref 13–17.7)
IMM GRANULOCYTES # BLD AUTO: 0.01 10*3/MM3 (ref 0–0.05)
IMM GRANULOCYTES NFR BLD AUTO: 0.3 % (ref 0–0.5)
IRON 24H UR-MRATE: 104 MCG/DL (ref 59–158)
IRON SATN MFR SERPL: 32 % (ref 20–50)
LDLC SERPL CALC-MCNC: 83 MG/DL (ref 0–100)
LDLC/HDLC SERPL: 2.1 {RATIO}
LYMPHOCYTES # BLD AUTO: 1.83 10*3/MM3 (ref 0.7–3.1)
LYMPHOCYTES NFR BLD AUTO: 46.1 % (ref 19.6–45.3)
MCH RBC QN AUTO: 31.1 PG (ref 26.6–33)
MCHC RBC AUTO-ENTMCNC: 33.3 G/DL (ref 31.5–35.7)
MCV RBC AUTO: 93.6 FL (ref 79–97)
MONOCYTES # BLD AUTO: 0.37 10*3/MM3 (ref 0.1–0.9)
MONOCYTES NFR BLD AUTO: 9.3 % (ref 5–12)
NEUTROPHILS NFR BLD AUTO: 1.61 10*3/MM3 (ref 1.7–7)
NEUTROPHILS NFR BLD AUTO: 40.5 % (ref 42.7–76)
NRBC BLD AUTO-RTO: 0 /100 WBC (ref 0–0.2)
PLATELET # BLD AUTO: 116 10*3/MM3 (ref 140–450)
PMV BLD AUTO: 10.9 FL (ref 6–12)
POTASSIUM SERPL-SCNC: 4.6 MMOL/L (ref 3.5–5.2)
PROT SERPL-MCNC: 6.6 G/DL (ref 6–8.5)
RBC # BLD AUTO: 4.72 10*6/MM3 (ref 4.14–5.8)
SODIUM SERPL-SCNC: 140 MMOL/L (ref 136–145)
TIBC SERPL-MCNC: 326 MCG/DL (ref 298–536)
TRANSFERRIN SERPL-MCNC: 219 MG/DL (ref 200–360)
TRIGL SERPL-MCNC: 55 MG/DL (ref 0–150)
VIT B12 BLD-MCNC: 329 PG/ML (ref 211–946)
VLDLC SERPL-MCNC: 12 MG/DL (ref 5–40)
WBC NRBC COR # BLD: 3.97 10*3/MM3 (ref 3.4–10.8)

## 2022-03-05 PROCEDURE — 36415 COLL VENOUS BLD VENIPUNCTURE: CPT | Performed by: FAMILY MEDICINE

## 2022-03-05 PROCEDURE — 82746 ASSAY OF FOLIC ACID SERUM: CPT | Performed by: NURSE PRACTITIONER

## 2022-03-05 PROCEDURE — 80053 COMPREHEN METABOLIC PANEL: CPT | Performed by: FAMILY MEDICINE

## 2022-03-05 PROCEDURE — 84466 ASSAY OF TRANSFERRIN: CPT | Performed by: NURSE PRACTITIONER

## 2022-03-05 PROCEDURE — 85025 COMPLETE CBC W/AUTO DIFF WBC: CPT | Performed by: FAMILY MEDICINE

## 2022-03-05 PROCEDURE — 82607 VITAMIN B-12: CPT | Performed by: NURSE PRACTITIONER

## 2022-03-05 PROCEDURE — 83540 ASSAY OF IRON: CPT | Performed by: NURSE PRACTITIONER

## 2022-03-05 PROCEDURE — 80061 LIPID PANEL: CPT | Performed by: FAMILY MEDICINE

## 2022-03-05 PROCEDURE — 82728 ASSAY OF FERRITIN: CPT | Performed by: NURSE PRACTITIONER

## 2022-03-05 NOTE — PROGRESS NOTES
..  Venipuncture Blood Specimen Collection  Venipuncture performed in left arm by Ira Harper with good hemostasis. Patient tolerated the procedure well without complications.   03/05/22   Ira Harper

## 2022-06-14 ENCOUNTER — LAB (OUTPATIENT)
Dept: LAB | Facility: HOSPITAL | Age: 62
End: 2022-06-14

## 2022-06-14 DIAGNOSIS — R97.20 ELEVATED PROSTATE SPECIFIC ANTIGEN (PSA): ICD-10-CM

## 2022-06-14 DIAGNOSIS — D64.9 ANEMIA, UNSPECIFIED TYPE: ICD-10-CM

## 2022-06-14 DIAGNOSIS — R97.20 ELEVATED PROSTATE SPECIFIC ANTIGEN (PSA): Primary | ICD-10-CM

## 2022-06-14 LAB
BASOPHILS # BLD AUTO: 0.04 10*3/MM3 (ref 0–0.2)
BASOPHILS NFR BLD AUTO: 0.4 % (ref 0–1.5)
DEPRECATED RDW RBC AUTO: 45.2 FL (ref 37–54)
EOSINOPHIL # BLD AUTO: 0.07 10*3/MM3 (ref 0–0.4)
EOSINOPHIL NFR BLD AUTO: 0.8 % (ref 0.3–6.2)
ERYTHROCYTE [DISTWIDTH] IN BLOOD BY AUTOMATED COUNT: 13.2 % (ref 12.3–15.4)
HCT VFR BLD AUTO: 44 % (ref 37.5–51)
HGB BLD-MCNC: 14.9 G/DL (ref 13–17.7)
IMM GRANULOCYTES # BLD AUTO: 0.04 10*3/MM3 (ref 0–0.05)
IMM GRANULOCYTES NFR BLD AUTO: 0.4 % (ref 0–0.5)
LYMPHOCYTES # BLD AUTO: 1.89 10*3/MM3 (ref 0.7–3.1)
LYMPHOCYTES NFR BLD AUTO: 20.8 % (ref 19.6–45.3)
MCH RBC QN AUTO: 31.6 PG (ref 26.6–33)
MCHC RBC AUTO-ENTMCNC: 33.9 G/DL (ref 31.5–35.7)
MCV RBC AUTO: 93.4 FL (ref 79–97)
MONOCYTES # BLD AUTO: 0.72 10*3/MM3 (ref 0.1–0.9)
MONOCYTES NFR BLD AUTO: 7.9 % (ref 5–12)
NEUTROPHILS NFR BLD AUTO: 6.33 10*3/MM3 (ref 1.7–7)
NEUTROPHILS NFR BLD AUTO: 69.7 % (ref 42.7–76)
NRBC BLD AUTO-RTO: 0 /100 WBC (ref 0–0.2)
PLATELET # BLD AUTO: 115 10*3/MM3 (ref 140–450)
PMV BLD AUTO: 11.4 FL (ref 6–12)
RBC # BLD AUTO: 4.71 10*6/MM3 (ref 4.14–5.8)
RBC MORPH BLD: NORMAL
SMALL PLATELETS BLD QL SMEAR: NORMAL
WBC MORPH BLD: NORMAL
WBC NRBC COR # BLD: 9.09 10*3/MM3 (ref 3.4–10.8)

## 2022-06-14 PROCEDURE — 36415 COLL VENOUS BLD VENIPUNCTURE: CPT

## 2022-06-14 PROCEDURE — 85007 BL SMEAR W/DIFF WBC COUNT: CPT

## 2022-06-14 PROCEDURE — 85025 COMPLETE CBC W/AUTO DIFF WBC: CPT

## 2022-06-14 RX ORDER — BUDESONIDE AND FORMOTEROL FUMARATE DIHYDRATE 160; 4.5 UG/1; UG/1
AEROSOL RESPIRATORY (INHALATION)
Qty: 10.2 G | Refills: 0 | Status: SHIPPED | OUTPATIENT
Start: 2022-06-14 | End: 2022-08-30 | Stop reason: SDUPTHER

## 2022-06-20 ENCOUNTER — OFFICE VISIT (OUTPATIENT)
Dept: UROLOGY | Facility: CLINIC | Age: 62
End: 2022-06-20

## 2022-06-20 VITALS — HEIGHT: 70 IN | WEIGHT: 173.8 LBS | BODY MASS INDEX: 24.88 KG/M2

## 2022-06-20 DIAGNOSIS — N40.0 BPH WITHOUT URINARY OBSTRUCTION: Primary | ICD-10-CM

## 2022-06-20 DIAGNOSIS — R97.20 ELEVATED PSA: ICD-10-CM

## 2022-06-20 LAB
BILIRUB BLD-MCNC: NEGATIVE MG/DL
CLARITY, POC: CLEAR
COLOR UR: YELLOW
EXPIRATION DATE: NORMAL
GLUCOSE UR STRIP-MCNC: NEGATIVE MG/DL
KETONES UR QL: NEGATIVE
LEUKOCYTE EST, POC: NEGATIVE
Lab: NORMAL
NITRITE UR-MCNC: NEGATIVE MG/ML
PH UR: 5.5 [PH] (ref 5–8)
PROT UR STRIP-MCNC: NEGATIVE MG/DL
RBC # UR STRIP: NEGATIVE /UL
SP GR UR: 1.02 (ref 1–1.03)
URINE VOLUME: NORMAL
UROBILINOGEN UR QL: NORMAL

## 2022-06-20 PROCEDURE — 99213 OFFICE O/P EST LOW 20 MIN: CPT | Performed by: UROLOGY

## 2022-06-20 PROCEDURE — 81003 URINALYSIS AUTO W/O SCOPE: CPT | Performed by: UROLOGY

## 2022-06-20 NOTE — ASSESSMENT & PLAN NOTE
- We will recheck his PSA and then again in 6 months. I will see him at that time with a PSA prior.

## 2022-06-20 NOTE — PROGRESS NOTES
Chief Complaint  Benign Prostatic Hypertrophy and Follow-up (6 month follow up)    Subjective     {Problem List  Visit Diagnosis   Encounters  Notes  Medications  Labs  Result Review Imaging  Media :23}     Andry Harper presents to [facility] UROLOGY  6/4/19 - Patient presents in follow up. He is doing well. He denies bothersome urinary issues. He has not had a recent PSA.     7/22/19 - Patient presents in follow up. He is doing well. He has no urinary issues. He had an MRI of the prostate and this showed no areas suggestive of clinically significant prostate cancer. The volume of the gland was 77ml. The patient did have a recent PSA and this was 12. This was increased from 6 at the time of his biopsy. I have recommended following this.     12/18/2019: Patient is here for follow-up of his elevated PSA.  It was recently checked and is found to be 7.5 at this point.  That is down from 12.  It was at 6 at the time of his prostate biopsy which was negative.  He is also had an MRI of his prostate which showed no areas suggestive of clinically significant prostate cancer.     6/10/20:  He is having more issues with frequency and urgency.  He states he has a slow stream at times.  He would like to try medicine for that.  His most recent PSA was 8 which is down from a high of 12 but up from last check at 6.  He had a negative prostate MRI but that was over a year ago.       12/9/20:  His most recent PSA in Nov 2020 is now 7.71 which is down for him.  He had a negative prostate MRI in 2019 and again in June 2020.  He had a negative prostate biopsy in the past when his PSA was 6.  It has been as high as 12 in the past.     6/22/21:  Most recent PSA is now 10 in June 2021.  This is similar to his higher PSAs in the past.  His last prostate MRI was in June 2020 and was negative.  He has had two negative prostate MRIs.  He has also had a negative prostate biopsy in the past.      12/20/21: Most  "recent PSA from 12/13/2021 was 9.120, which is actually down a little bit from when he was seen in 06/2021, but is similar to his baseline, which he normally runs between 7 and 9. He inquires about making any changes. The patient reports occasional nocturia. He denies any changes in his flow of urine. He reports the amount he urinates varies. The patient inquires about the Flomax, changing doses and his PSA count. He notes decreased times of urination at night.    6/20/22:    Mr. Harper, 1960, is a follow-up for elevated PSA and BPH. His most recent PSA from 02/01/2022 was 9.2 ng/mL, which is around his baseline. He has had 2 negative prostate MRIs and a negative prostate biopsy as well.    The patient reports that he is doing well. He states that he has an appointment with MIKE Lyon, in 02/2023. The patient sees her once a year if his counts are all leveled out.    A review of systems was completed and positive findings are noted in the HPI.   Objective   Vital Signs:   Ht 177.8 cm (70\")   Wt 78.8 kg (173 lb 12.8 oz)   BMI 24.94 kg/m²     Physical Exam  Vitals and nursing note reviewed.   Constitutional:       Appearance: Normal appearance. He is well-developed.   Pulmonary:      Effort: Pulmonary effort is normal.      Breath sounds: Normal air entry.   Neurological:      Mental Status: He is alert and oriented to person, place, and time.      Motor: Motor function is intact.   Psychiatric:         Mood and Affect: Mood normal.         Behavior: Behavior normal.            Result Review :       Results for orders placed or performed in visit on 06/20/22   Bladder Scan   Result Value Ref Range    Urine Volume 10 mL    POC Urinalysis Dipstick, Automated    Specimen: Urine   Result Value Ref Range    Color Yellow Yellow, Straw, Dark Yellow, Nicole    Clarity, UA Clear Clear    Specific Gravity  1.025 1.005 - 1.030    pH, Urine 5.5 5.0 - 8.0    Leukocytes Negative Negative    Nitrite, UA Negative " Negative    Protein, POC Negative Negative mg/dL    Glucose, UA Negative Negative, 1000 mg/dL (3+) mg/dL    Ketones, UA Negative Negative    Urobilinogen, UA Normal Normal    Bilirubin Negative Negative    Blood, UA Negative Negative    Lot Number [id number]     Expiration Date 5/23      PSA    PSA 12/13/21 2/1/22   PSA 9.120 (A) 9.230 (A)   (A) Abnormal value                            Assessment and Plan    Diagnoses and all orders for this visit:    1. BPH without urinary obstruction (Primary)  -     Bladder Scan  -     POC Urinalysis Dipstick, Automated    2. Elevated PSA  -     PSA DIAGNOSTIC; Future  -     PSA DIAGNOSTIC; Future     Assessment & Plan        -     We will recheck his PSA and then again in 6 months. I will see him at that time with a PSA prior.        Follow Up       No follow-ups on file.  Patient was given instructions and counseling regarding his condition or for health maintenance advice. Please see specific information pulled into the AVS if appropriate.       Transcribed from ambient dictation for Michelle Hightower MD by Guillermina Spears.  06/20/22   17:27 EDT    Patient verbalized consent to the visit recording.

## 2022-07-07 ENCOUNTER — LAB (OUTPATIENT)
Dept: LAB | Facility: HOSPITAL | Age: 62
End: 2022-07-07

## 2022-07-07 DIAGNOSIS — R97.20 ELEVATED PSA: ICD-10-CM

## 2022-07-07 LAB — PSA SERPL-MCNC: 9.18 NG/ML (ref 0–4)

## 2022-07-07 PROCEDURE — 36415 COLL VENOUS BLD VENIPUNCTURE: CPT

## 2022-07-07 PROCEDURE — 84153 ASSAY OF PSA TOTAL: CPT

## 2022-07-18 ENCOUNTER — TELEPHONE (OUTPATIENT)
Dept: SURGERY | Facility: CLINIC | Age: 62
End: 2022-07-18

## 2022-07-18 NOTE — TELEPHONE ENCOUNTER
His PSA is now 9 which is exactly what it has been the last 3 times.  I will see him in 6 months with PSA prior.

## 2022-07-18 NOTE — TELEPHONE ENCOUNTER
Spoke with patient informing him that, per Dr. Hightower, his PSA is 9 which is exactly what it has been the last 3 times it has been checked. She would like to recheck his PSA in 6 months, prior to his 12/19/22 appointment.

## 2022-08-30 ENCOUNTER — OFFICE VISIT (OUTPATIENT)
Dept: PULMONOLOGY | Facility: CLINIC | Age: 62
End: 2022-08-30

## 2022-08-30 VITALS
HEIGHT: 70 IN | BODY MASS INDEX: 25.05 KG/M2 | HEART RATE: 67 BPM | DIASTOLIC BLOOD PRESSURE: 66 MMHG | RESPIRATION RATE: 18 BRPM | TEMPERATURE: 97.8 F | SYSTOLIC BLOOD PRESSURE: 109 MMHG | WEIGHT: 175 LBS | OXYGEN SATURATION: 97 %

## 2022-08-30 DIAGNOSIS — J69.1 LIPOID PNEUMONIA: ICD-10-CM

## 2022-08-30 DIAGNOSIS — J82.81 EOSINOPHILIC PNEUMONIA: Primary | ICD-10-CM

## 2022-08-30 PROCEDURE — 99213 OFFICE O/P EST LOW 20 MIN: CPT | Performed by: INTERNAL MEDICINE

## 2022-08-30 RX ORDER — BUDESONIDE AND FORMOTEROL FUMARATE DIHYDRATE 160; 4.5 UG/1; UG/1
2 AEROSOL RESPIRATORY (INHALATION)
Qty: 1 EACH | Refills: 11 | Status: SHIPPED | OUTPATIENT
Start: 2022-08-30

## 2022-08-30 NOTE — PROGRESS NOTES
Primary Care Provider  Keenan Craft MD     Referring Provider  No ref. provider found     Chief Complaint  Chronic Eosinophilic Pneumonia , Follow-up (1 Year Follow Up ), Fatigue (With exertion ), and Shortness of Breath (With exertion )            History of Presenting Illness  62-year-old  male with history of chronic eosinophilic pneumonia lipoid pneumonia here for follow-up.  He remains on Symbicort twice a day and his symptoms are doing the same.  He gets short of breath walking about 1500 feet or up 2 flights of steps.  At work he can lift a heavy object for a little bit the next to stop and take a 5-minute break.  He will use rescue inhaler a few times a week.  Overall his disease has been stable.  He reports he is getting a chest CT this fall.  There is been no changes noted on previous chest CTs with groundglass infiltrates.  Dyspnea is mild in severity, worse with activity only with rest.  He has occasional wheezing and cough productive with thin clear sputum associated with it.    Patient denies fever, chills, night sweats, swollen glands in the head and neck, hemoptysis, unintentional weight loss, dysphagia, chest pain, chest tightness, palpitations, abdominal pain, nausea, vomiting, and diarrhea.  Patient also denies any myalgias, changes in sense of taste and/or smell, sore throat, any other coronavirus flulike symptoms.  Patient denies any leg swelling, orthopnea, paroxysmal nocturnal dyspnea.  Patient denies any history of any bleeding or blood clotting disorders.  Patient denies any recent travel.  Patient denies any exposure to any ill contacts.  Patient states he is not taking any hormone replacement therapy.  He is able to perform his ADLs without difficulty and denies any swollen glands or lymph nodes in his head and neck.      Review of Systems   Constitutional: Negative for activity change, appetite change, chills, diaphoresis, fatigue, fever, unexpected weight gain and  unexpected weight loss.        Negative for Insomnia   HENT: Negative for congestion (Nasal), mouth sores, nosebleeds, postnasal drip, sore throat, swollen glands and trouble swallowing.         Negative for Thrush  Negative for Hoarseness  Negative for Allergies/Hay Fever  Negative for Recent Head injury  Negative for Ear Fullness  Negative for Nasal or Sinus pain  Negative for Dry lips  Negative for Nasal discharge   Respiratory: Positive for cough, shortness of breath and wheezing. Negative for apnea and chest tightness.         Negative for Hemoptysis  Negative for Pleuritic pain   Cardiovascular: Negative for chest pain, palpitations and leg swelling.        Negative for Claudication  Negative for Cyanosis  Negative for Dyspnea on exertion   Gastrointestinal: Negative for abdominal pain, diarrhea, nausea, vomiting and GERD.   Musculoskeletal: Negative for joint swelling and myalgias.        Negative for Joint pain  Negative for Joint stiffness   Skin: Negative for color change, dry skin, pallor and rash.   Neurological: Negative for syncope, weakness and headache.   Hematological: Negative for adenopathy. Does not bruise/bleed easily.          Family History   Problem Relation Age of Onset   • Lymphoma Father    • Cancer Father    • Stroke Mother    • Heart disease Brother    • Heart attack Brother    • Stroke Brother    • Diabetes Brother    • Cancer Brother    • Kidney disease Brother         Social History     Socioeconomic History   • Marital status: Single   Tobacco Use   • Smoking status: Never Smoker   • Smokeless tobacco: Never Used   Vaping Use   • Vaping Use: Never used   Substance and Sexual Activity   • Alcohol use: Not Currently   • Drug use: Never   • Sexual activity: Defer        Past Medical History:   Diagnosis Date   • Anemia    • AVM (arteriovenous malformation)     cecal AVM   • Hyperlipidemia    • Hypertension    • Lung disease         Immunization History   Administered Date(s)  Administered   • COVID-19 (PFIZER) PURPLE CAP 04/05/2021, 04/26/2021   • Flu Vaccine Intradermal Quad 18-64YR 10/07/2020   • Flu Vaccine Quad PF >36MO 10/01/2018, 09/30/2019   • Influenza, Unspecified 11/09/2021   • Pneumococcal Polysaccharide (PPSV23) 08/25/2021       Allergies   Allergen Reactions   • Penicillins Unknown - Low Severity          Current Outpatient Medications:   •  albuterol sulfate  (90 Base) MCG/ACT inhaler, Ventolin HFA 90 mcg/actuation inhalation HFA aerosol inhaler inhale 1 puff (90 mcg) by inhalation route every 4-6 hours as needed   Active, Disp: , Rfl:   •  atorvastatin (LIPITOR) 20 MG tablet, Take 1 tablet by mouth Daily., Disp: 30 tablet, Rfl: 5  •  budesonide-formoterol (SYMBICORT) 160-4.5 MCG/ACT inhaler, Inhale 2 puffs 2 (Two) Times a Day., Disp: 1 each, Rfl: 11  •  fluticasone (Flonase) 50 MCG/ACT nasal spray, 2 sprays into the nostril(s) as directed by provider Daily., Disp: 16 g, Rfl: 5  •  levocetirizine (Xyzal) 5 MG tablet, Take 1 tablet by mouth Every Evening., Disp: 30 tablet, Rfl: 5  •  tamsulosin (FLOMAX) 0.4 MG capsule 24 hr capsule, Take 2 capsules by mouth Daily for 360 days., Disp: 180 capsule, Rfl: 3  •  vitamin D (ERGOCALCIFEROL) 1.25 MG (08339 UT) capsule capsule, Take 1 capsule by mouth Every 7 (Seven) Days for 15 doses., Disp: 5 capsule, Rfl: 2         Physical Exam  Vital Signs Reviewed  WDWN, Alert, NAD.    HEENT:  PERRL, EOMI.  OP, nares clear, no sinus tenderness  Chest: mildly decreased breath sounds throughout. No wheezes, rales, or rhonchi appreciated.  Normal work of breathing noted.  Patient is able speak full sentences without difficulty.  CV: RRR, no MGR, pulses 2+, equal.  Abd:  Soft, NT, ND, + BS, no HSM  EXT:  no clubbing, no cyanosis, no edema  Neuro:  A&Ox3, CN grossly intact, no focal deficits.  Skin: No rashes or lesions noted.      Vital Signs:   /66 (BP Location: Left arm, Patient Position: Sitting, Cuff Size: Adult)   Pulse 67    "Temp 97.8 °F (36.6 °C) (Infrared)   Resp 18   Ht 177.8 cm (70\")   Wt 79.4 kg (175 lb)   SpO2 97% Comment: Room air  BMI 25.11 kg/m²         Result Review :   I personally viewed my last office note       Assessment and Plan      Assessment:  1. Chronic eosinophilic pneumonia, stable.      2. Lipoid pneumonia.      3. Chronic dyspnea.      4. Chronic wheezing.      5. Never smoker.   6.  Iron deficiency anemia/thrombocytopenia patient is under the care of MIKE Lyon   7.  Intermittent chest pain.  No chest pain in the office today per patient report.  Getting stress test per cardiologist  8.  Seasonal allergies/allergic rhinitis.      Plan:  Continue Symbicort 2 puffs twice a day with albuterol as needed  Continue Singulair and Zyrtec  Encourage activity   Patient reports he is up-to-date with his flu, Pneumovax and COVID vaccinations.  Prevnar due when he is 63  Anemia and thrombocytopenia per hematology      Follow Up   Return in about 1 year (around 8/30/2023).  Patient was given instructions and counseling regarding his condition or for health maintenance advice. Please see specific information pulled into the AVS if appropriate.     Electronically signed by Pernell Bryant MD, 08/30/22, 10:41 AM EDT.      "

## 2022-09-03 DIAGNOSIS — E78.2 MIXED HYPERLIPIDEMIA: ICD-10-CM

## 2022-09-06 RX ORDER — ATORVASTATIN CALCIUM 20 MG/1
TABLET, FILM COATED ORAL
Qty: 30 TABLET | Refills: 1 | Status: SHIPPED | OUTPATIENT
Start: 2022-09-06 | End: 2022-09-13 | Stop reason: SDUPTHER

## 2022-09-13 ENCOUNTER — OFFICE VISIT (OUTPATIENT)
Dept: FAMILY MEDICINE CLINIC | Facility: CLINIC | Age: 62
End: 2022-09-13

## 2022-09-13 VITALS
WEIGHT: 171 LBS | DIASTOLIC BLOOD PRESSURE: 60 MMHG | HEART RATE: 80 BPM | TEMPERATURE: 98.3 F | SYSTOLIC BLOOD PRESSURE: 98 MMHG | OXYGEN SATURATION: 99 % | BODY MASS INDEX: 24.54 KG/M2

## 2022-09-13 DIAGNOSIS — E78.2 MIXED HYPERLIPIDEMIA: ICD-10-CM

## 2022-09-13 DIAGNOSIS — D69.3 CHRONIC IDIOPATHIC THROMBOCYTOPENIA: Primary | ICD-10-CM

## 2022-09-13 PROCEDURE — 99214 OFFICE O/P EST MOD 30 MIN: CPT | Performed by: FAMILY MEDICINE

## 2022-09-13 RX ORDER — ATORVASTATIN CALCIUM 20 MG/1
20 TABLET, FILM COATED ORAL DAILY
Qty: 30 TABLET | Refills: 5 | Status: SHIPPED | OUTPATIENT
Start: 2022-09-13 | End: 2023-01-27 | Stop reason: SDUPTHER

## 2022-09-13 NOTE — PROGRESS NOTES
Chief Complaint  Hyperlipidemia (refills)    Subjective          Andry Harper presents to [facility]  Hyperlipidemia  This is a chronic problem. The current episode started more than 1 year ago. The problem is controlled. Recent lipid tests were reviewed and are variable. There are no known factors aggravating his hyperlipidemia. Pertinent negatives include no chest pain, focal sensory loss, focal weakness, leg pain, myalgias or shortness of breath. Current antihyperlipidemic treatment includes statins. The current treatment provides significant improvement of lipids. There are no compliance problems.  Risk factors for coronary artery disease include dyslipidemia, male sex and family history.       Objective   Allergies   Allergen Reactions   • Penicillins Unknown - Low Severity     Immunization History   Administered Date(s) Administered   • COVID-19 (PFIZER) PURPLE CAP 04/05/2021, 04/26/2021   • Flu Vaccine Intradermal Quad 18-64YR 10/07/2020   • Flu Vaccine Quad PF >36MO 10/01/2018, 09/30/2019   • Influenza, Unspecified 11/09/2021   • Pneumococcal Polysaccharide (PPSV23) 08/25/2021     Past Medical History:   Diagnosis Date   • Anemia    • AVM (arteriovenous malformation)     cecal AVM   • Hyperlipidemia    • Hypertension    • Lung disease       Past Surgical History:   Procedure Laterality Date   • COLONOSCOPY     • LUNG BIOPSY Right       Social History     Socioeconomic History   • Marital status: Single   Tobacco Use   • Smoking status: Never Smoker   • Smokeless tobacco: Never Used   Vaping Use   • Vaping Use: Never used   Substance and Sexual Activity   • Alcohol use: Not Currently   • Drug use: Never   • Sexual activity: Defer        Current Outpatient Medications:   •  albuterol sulfate  (90 Base) MCG/ACT inhaler, Ventolin HFA 90 mcg/actuation inhalation HFA aerosol inhaler inhale 1 puff (90 mcg) by inhalation route every 4-6 hours as needed   Active, Disp: ,  Rfl:   •  atorvastatin (LIPITOR) 20 MG tablet, Take 1 tablet by mouth Daily., Disp: 30 tablet, Rfl: 5  •  budesonide-formoterol (SYMBICORT) 160-4.5 MCG/ACT inhaler, Inhale 2 puffs 2 (Two) Times a Day., Disp: 1 each, Rfl: 11  •  fluticasone (Flonase) 50 MCG/ACT nasal spray, 2 sprays into the nostril(s) as directed by provider Daily., Disp: 16 g, Rfl: 5  •  levocetirizine (Xyzal) 5 MG tablet, Take 1 tablet by mouth Every Evening., Disp: 30 tablet, Rfl: 5  •  tamsulosin (FLOMAX) 0.4 MG capsule 24 hr capsule, Take 2 capsules by mouth Daily for 360 days., Disp: 180 capsule, Rfl: 3  •  vitamin D (ERGOCALCIFEROL) 1.25 MG (03687 UT) capsule capsule, Take 1 capsule by mouth Every 7 (Seven) Days for 15 doses., Disp: 5 capsule, Rfl: 2   Family History   Problem Relation Age of Onset   • Lymphoma Father    • Cancer Father    • Stroke Mother    • Heart disease Brother    • Heart attack Brother    • Stroke Brother    • Diabetes Brother    • Cancer Brother    • Kidney disease Brother           Vital Signs:   Vitals:    09/13/22 1552   BP: 98/60   Pulse: 80   Temp: 98.3 °F (36.8 °C)   SpO2: 99%   Weight: 77.6 kg (171 lb)       Review of Systems   Respiratory: Negative for shortness of breath.    Cardiovascular: Negative for chest pain.   Musculoskeletal: Negative for myalgias.   Neurological: Negative for focal weakness.      Physical Exam  Vitals reviewed.   Constitutional:       Appearance: Normal appearance. He is well-developed.   HENT:      Head: Normocephalic and atraumatic.      Right Ear: External ear normal.      Left Ear: External ear normal.      Mouth/Throat:      Pharynx: No oropharyngeal exudate.   Eyes:      Conjunctiva/sclera: Conjunctivae normal.      Pupils: Pupils are equal, round, and reactive to light.   Cardiovascular:      Rate and Rhythm: Normal rate and regular rhythm.      Pulses: Normal pulses.      Heart sounds: Normal heart sounds. No murmur heard.    No friction rub. No gallop.   Pulmonary:       Effort: Pulmonary effort is normal.      Breath sounds: Normal breath sounds. No wheezing or rhonchi.   Abdominal:      General: Abdomen is flat. Bowel sounds are normal. There is no distension.      Palpations: Abdomen is soft. There is no mass.      Tenderness: There is no abdominal tenderness. There is no guarding or rebound.      Hernia: No hernia is present.   Musculoskeletal:         General: Normal range of motion.   Skin:     General: Skin is warm and dry.      Capillary Refill: Capillary refill takes less than 2 seconds.   Neurological:      General: No focal deficit present.      Mental Status: He is alert and oriented to person, place, and time.      Cranial Nerves: No cranial nerve deficit.   Psychiatric:         Mood and Affect: Mood and affect normal.         Behavior: Behavior normal.         Thought Content: Thought content normal.         Judgment: Judgment normal.        Result Review :   The following data was reviewed by: Keenan Craft MD on 09/13/2022:  CMP    CMP 2/1/22 3/5/22   Glucose 95 107 (A)   BUN 17 13   Creatinine 0.81 0.65 (A)   eGFR Non African Am 96    Sodium 138 140   Potassium 4.2 4.6   Chloride 102 105   Calcium 9.4 9.1   Albumin 4.40 4.20   Total Bilirubin 0.7 0.7   Alkaline Phosphatase 84 79   AST (SGOT) 22 20   ALT (SGPT) 23 24   (A) Abnormal value            CBC    CBC 2/1/22 3/5/22 6/14/22   WBC 6.85 3.97 9.09   RBC 4.85 4.72 4.71   Hemoglobin 15.1 14.7 14.9   Hematocrit 45.3 44.2 44.0   MCV 93.4 93.6 93.4   MCH 31.1 31.1 31.6   MCHC 33.3 33.3 33.9   RDW 13.1 13.1 13.2   Platelets 117 (A) 116 (A) 115 (A)   (A) Abnormal value            Lipid Panel    Lipid Panel 3/5/22   Total Cholesterol 135   Triglycerides 55   HDL Cholesterol 40   VLDL Cholesterol 12   LDL Cholesterol  83   LDL/HDL Ratio 2.10               PSA    PSA 12/13/21 2/1/22 7/7/22   PSA 9.120 (A) 9.230 (A) 9.180 (A)   (A) Abnormal value                      Assessment and Plan    Diagnoses and all orders for  this visit:    1. Chronic idiopathic thrombocytopenia (HCC) (Primary)  -     CBC Auto Differential    2. Mixed hyperlipidemia  -     atorvastatin (LIPITOR) 20 MG tablet; Take 1 tablet by mouth Daily.  Dispense: 30 tablet; Refill: 5  -     Comprehensive Metabolic Panel  -     Lipid Panel            Follow Up   Return in about 6 months (around 3/13/2023) for Recheck.  Patient was given instructions and counseling regarding his condition or for health maintenance advice. Please see specific information pulled into the AVS if appropriate.

## 2022-09-16 ENCOUNTER — CLINICAL SUPPORT (OUTPATIENT)
Dept: FAMILY MEDICINE CLINIC | Facility: CLINIC | Age: 62
End: 2022-09-16

## 2022-09-16 DIAGNOSIS — N40.0 BPH WITHOUT URINARY OBSTRUCTION: ICD-10-CM

## 2022-09-16 LAB
ALBUMIN SERPL-MCNC: 4 G/DL (ref 3.5–5.2)
ALBUMIN/GLOB SERPL: 1.8 G/DL
ALP SERPL-CCNC: 82 U/L (ref 39–117)
ALT SERPL W P-5'-P-CCNC: 17 U/L (ref 1–41)
ANION GAP SERPL CALCULATED.3IONS-SCNC: 8.2 MMOL/L (ref 5–15)
AST SERPL-CCNC: 20 U/L (ref 1–40)
BASOPHILS # BLD AUTO: 0.02 10*3/MM3 (ref 0–0.2)
BASOPHILS NFR BLD AUTO: 0.4 % (ref 0–1.5)
BILIRUB SERPL-MCNC: 0.8 MG/DL (ref 0–1.2)
BUN SERPL-MCNC: 10 MG/DL (ref 8–23)
BUN/CREAT SERPL: 12.7 (ref 7–25)
CALCIUM SPEC-SCNC: 8.9 MG/DL (ref 8.6–10.5)
CHLORIDE SERPL-SCNC: 102 MMOL/L (ref 98–107)
CHOLEST SERPL-MCNC: 151 MG/DL (ref 0–200)
CO2 SERPL-SCNC: 28.8 MMOL/L (ref 22–29)
CREAT SERPL-MCNC: 0.79 MG/DL (ref 0.76–1.27)
DEPRECATED RDW RBC AUTO: 44.2 FL (ref 37–54)
EGFRCR SERPLBLD CKD-EPI 2021: 99.2 ML/MIN/1.73
EOSINOPHIL # BLD AUTO: 0.1 10*3/MM3 (ref 0–0.4)
EOSINOPHIL NFR BLD AUTO: 2.2 % (ref 0.3–6.2)
ERYTHROCYTE [DISTWIDTH] IN BLOOD BY AUTOMATED COUNT: 13.1 % (ref 12.3–15.4)
GLOBULIN UR ELPH-MCNC: 2.2 GM/DL
GLUCOSE SERPL-MCNC: 105 MG/DL (ref 65–99)
HCT VFR BLD AUTO: 43.2 % (ref 37.5–51)
HDLC SERPL-MCNC: 37 MG/DL (ref 40–60)
HGB BLD-MCNC: 15 G/DL (ref 13–17.7)
IMM GRANULOCYTES # BLD AUTO: 0.01 10*3/MM3 (ref 0–0.05)
IMM GRANULOCYTES NFR BLD AUTO: 0.2 % (ref 0–0.5)
LDLC SERPL CALC-MCNC: 101 MG/DL (ref 0–100)
LDLC/HDLC SERPL: 2.75 {RATIO}
LYMPHOCYTES # BLD AUTO: 1.8 10*3/MM3 (ref 0.7–3.1)
LYMPHOCYTES NFR BLD AUTO: 39.3 % (ref 19.6–45.3)
MCH RBC QN AUTO: 31.8 PG (ref 26.6–33)
MCHC RBC AUTO-ENTMCNC: 34.7 G/DL (ref 31.5–35.7)
MCV RBC AUTO: 91.7 FL (ref 79–97)
MONOCYTES # BLD AUTO: 0.37 10*3/MM3 (ref 0.1–0.9)
MONOCYTES NFR BLD AUTO: 8.1 % (ref 5–12)
NEUTROPHILS NFR BLD AUTO: 2.28 10*3/MM3 (ref 1.7–7)
NEUTROPHILS NFR BLD AUTO: 49.8 % (ref 42.7–76)
NRBC BLD AUTO-RTO: 0 /100 WBC (ref 0–0.2)
PLATELET # BLD AUTO: 105 10*3/MM3 (ref 140–450)
PMV BLD AUTO: 11.4 FL (ref 6–12)
POTASSIUM SERPL-SCNC: 4.3 MMOL/L (ref 3.5–5.2)
PROT SERPL-MCNC: 6.2 G/DL (ref 6–8.5)
RBC # BLD AUTO: 4.71 10*6/MM3 (ref 4.14–5.8)
SODIUM SERPL-SCNC: 139 MMOL/L (ref 136–145)
TRIGL SERPL-MCNC: 62 MG/DL (ref 0–150)
VLDLC SERPL-MCNC: 13 MG/DL (ref 5–40)
WBC NRBC COR # BLD: 4.58 10*3/MM3 (ref 3.4–10.8)

## 2022-09-16 PROCEDURE — 80053 COMPREHEN METABOLIC PANEL: CPT | Performed by: FAMILY MEDICINE

## 2022-09-16 PROCEDURE — 85025 COMPLETE CBC W/AUTO DIFF WBC: CPT | Performed by: FAMILY MEDICINE

## 2022-09-16 PROCEDURE — 36415 COLL VENOUS BLD VENIPUNCTURE: CPT | Performed by: FAMILY MEDICINE

## 2022-09-16 PROCEDURE — 80061 LIPID PANEL: CPT | Performed by: FAMILY MEDICINE

## 2022-09-16 NOTE — PROGRESS NOTES
Venipuncture Blood Specimen Collection  Venipuncture performed in left arm  by Peace Chanel with good hemostasis. Patient tolerated the procedure well without complications.   09/16/22   Peace Chanel

## 2022-09-22 NOTE — PROGRESS NOTES
Labs showed no significant abnormalities.  Continue to watch diet and exercise for slight cholesterol elevation.

## 2022-12-12 ENCOUNTER — LAB (OUTPATIENT)
Dept: LAB | Facility: HOSPITAL | Age: 62
End: 2022-12-12

## 2022-12-12 DIAGNOSIS — R97.20 ELEVATED PSA: ICD-10-CM

## 2022-12-12 LAB — PSA SERPL-MCNC: 9.74 NG/ML (ref 0–4)

## 2022-12-12 PROCEDURE — 36415 COLL VENOUS BLD VENIPUNCTURE: CPT

## 2022-12-12 PROCEDURE — 84153 ASSAY OF PSA TOTAL: CPT

## 2022-12-19 ENCOUNTER — OFFICE VISIT (OUTPATIENT)
Dept: UROLOGY | Facility: CLINIC | Age: 62
End: 2022-12-19

## 2022-12-19 VITALS — HEIGHT: 70 IN | WEIGHT: 165.8 LBS | BODY MASS INDEX: 23.74 KG/M2

## 2022-12-19 DIAGNOSIS — N40.0 BPH WITHOUT URINARY OBSTRUCTION: Primary | ICD-10-CM

## 2022-12-19 DIAGNOSIS — R97.20 ELEVATED PSA: ICD-10-CM

## 2022-12-19 DIAGNOSIS — R97.20 ELEVATED PROSTATE SPECIFIC ANTIGEN (PSA): ICD-10-CM

## 2022-12-19 LAB
BILIRUB BLD-MCNC: NEGATIVE MG/DL
CLARITY, POC: CLEAR
COLOR UR: YELLOW
EXPIRATION DATE: ABNORMAL
GLUCOSE UR STRIP-MCNC: NEGATIVE MG/DL
KETONES UR QL: NEGATIVE
LEUKOCYTE EST, POC: NEGATIVE
Lab: ABNORMAL
NITRITE UR-MCNC: NEGATIVE MG/ML
PH UR: 5 [PH] (ref 5–8)
PROT UR STRIP-MCNC: NEGATIVE MG/DL
RBC # UR STRIP: ABNORMAL /UL
SP GR UR: 1.01 (ref 1–1.03)
UROBILINOGEN UR QL: ABNORMAL

## 2022-12-19 PROCEDURE — 99212 OFFICE O/P EST SF 10 MIN: CPT | Performed by: UROLOGY

## 2022-12-19 PROCEDURE — 81003 URINALYSIS AUTO W/O SCOPE: CPT | Performed by: UROLOGY

## 2022-12-19 NOTE — PROGRESS NOTES
Chief Complaint  Benign Prostatic Hypertrophy    Subjective          Andry Harper presents to [facility] UROLOGY  History of Present Illness  6/4/19 - Patient presents in follow up. He is doing well. He denies bothersome urinary issues. He has not had a recent PSA.     7/22/19 - Patient presents in follow up. He is doing well. He has no urinary issues. He had an MRI of the prostate and this showed no areas suggestive of clinically significant prostate cancer. The volume of the gland was 77ml. The patient did have a recent PSA and this was 12. This was increased from 6 at the time of his biopsy. I have recommended following this.     12/18/2019: Patient is here for follow-up of his elevated PSA.  It was recently checked and is found to be 7.5 at this point.  That is down from 12.  It was at 6 at the time of his prostate biopsy which was negative.  He is also had an MRI of his prostate which showed no areas suggestive of clinically significant prostate cancer.     6/10/20:  He is having more issues with frequency and urgency.  He states he has a slow stream at times.  He would like to try medicine for that.  His most recent PSA was 8 which is down from a high of 12 but up from last check at 6.  He had a negative prostate MRI but that was over a year ago.       12/9/20:  His most recent PSA in Nov 2020 is now 7.71 which is down for him.  He had a negative prostate MRI in 2019 and again in June 2020.  He had a negative prostate biopsy in the past when his PSA was 6.  It has been as high as 12 in the past.     6/22/21:  Most recent PSA is now 10 in June 2021.  This is similar to his higher PSAs in the past.  His last prostate MRI was in June 2020 and was negative.  He has had two negative prostate MRIs.  He has also had a negative prostate biopsy in the past.      12/20/21: Most recent PSA from 12/13/2021 was 9.120, which is actually down a little bit from when he was seen in 06/2021, but is similar  "to his baseline, which he normally runs between 7 and 9. He inquires about making any changes. The patient reports occasional nocturia. He denies any changes in his flow of urine. He reports the amount he urinates varies. The patient inquires about the Flomax, changing doses and his PSA count. He notes decreased times of urination at night.     6/20/22:    Mr. Harper, 1960, is a follow-up for elevated PSA and BPH. His most recent PSA from 02/01/2022 was 9.2 ng/mL, which is around his baseline. He has had 2 negative prostate MRIs and a negative prostate biopsy as well.     12/19/22:    He reports that he is doing well. His PSA has been about the same the past 3 times it was checked.      Objective   Vital Signs:   Ht 177.8 cm (70\")   Wt 75.2 kg (165 lb 12.8 oz)   BMI 23.79 kg/m²     Physical Exam  Vitals and nursing note reviewed.   Constitutional:       Appearance: Normal appearance. He is well-developed.   Pulmonary:      Effort: Pulmonary effort is normal.      Breath sounds: Normal air entry.   Neurological:      Mental Status: He is alert and oriented to person, place, and time.      Motor: Motor function is intact.   Psychiatric:         Mood and Affect: Mood normal.         Behavior: Behavior normal.          Result Review :   The following data was reviewed by: Michelle Hightower MD on 12/19/2022:    Results for orders placed or performed in visit on 12/19/22   POC Urinalysis Dipstick, Automated    Specimen: Urine   Result Value Ref Range    Color Yellow Yellow, Straw, Dark Yellow, Nicole    Clarity, UA Clear Clear    Specific Gravity  1.015 1.005 - 1.030    pH, Urine 5.0 5.0 - 8.0    Leukocytes Negative Negative    Nitrite, UA Negative Negative    Protein, POC Negative Negative mg/dL    Glucose, UA Negative Negative mg/dL    Ketones, UA Negative Negative    Urobilinogen, UA 0.2 E.U./dL Normal, 0.2 E.U./dL    Bilirubin Negative Negative    Blood, UA Trace (A) Negative    Lot Number [id number]     Expiration " Date 22,024        PSA    PSA 2/1/22 7/7/22 12/12/22   PSA 9.230 (A) 9.180 (A) 9.740 (A)   (A) Abnormal value                   Assessment and Plan    Diagnoses and all orders for this visit:    1. BPH without urinary obstruction (Primary)  -     POC Urinalysis Dipstick, Automated    2. Elevated PSA  -     PSA DIAGNOSTIC; Future    3. Elevated prostate specific antigen (PSA)  Assessment & Plan:  We will see him back with a repeat PSA in 6 months.          Follow Up       No follow-ups on file.  Patient was given instructions and counseling regarding his condition or for health maintenance advice. Please see specific information pulled into the AVS if appropriate.     Transcribed from ambient dictation for Michelle Hightower MD by Denisse Dean.  12/19/22   16:19 EST    Patient or patient representative verbalized consent to the visit recording.  I have personally performed the services described in this document as transcribed by the above individual, and it is both accurate and complete.  Michelle Hightower MD  12/21/2022  17:42 EST

## 2023-01-10 DIAGNOSIS — N40.1 BPH WITH OBSTRUCTION/LOWER URINARY TRACT SYMPTOMS: ICD-10-CM

## 2023-01-10 DIAGNOSIS — N13.8 BPH WITH OBSTRUCTION/LOWER URINARY TRACT SYMPTOMS: ICD-10-CM

## 2023-01-10 DIAGNOSIS — N40.0 BPH WITHOUT URINARY OBSTRUCTION: Primary | ICD-10-CM

## 2023-01-10 RX ORDER — TAMSULOSIN HYDROCHLORIDE 0.4 MG/1
2 CAPSULE ORAL DAILY
Qty: 180 CAPSULE | Refills: 3 | Status: SHIPPED | OUTPATIENT
Start: 2023-01-10

## 2023-01-27 ENCOUNTER — OFFICE VISIT (OUTPATIENT)
Dept: FAMILY MEDICINE CLINIC | Facility: CLINIC | Age: 63
End: 2023-01-27
Payer: COMMERCIAL

## 2023-01-27 VITALS
OXYGEN SATURATION: 98 % | BODY MASS INDEX: 24.05 KG/M2 | HEART RATE: 100 BPM | SYSTOLIC BLOOD PRESSURE: 120 MMHG | WEIGHT: 168 LBS | DIASTOLIC BLOOD PRESSURE: 72 MMHG | TEMPERATURE: 98 F | HEIGHT: 70 IN

## 2023-01-27 DIAGNOSIS — E78.2 MIXED HYPERLIPIDEMIA: ICD-10-CM

## 2023-01-27 DIAGNOSIS — Z12.11 SCREEN FOR COLON CANCER: Primary | ICD-10-CM

## 2023-01-27 DIAGNOSIS — J30.89 NON-SEASONAL ALLERGIC RHINITIS, UNSPECIFIED TRIGGER: ICD-10-CM

## 2023-01-27 PROCEDURE — 99213 OFFICE O/P EST LOW 20 MIN: CPT | Performed by: FAMILY MEDICINE

## 2023-01-27 RX ORDER — FLUTICASONE PROPIONATE 50 MCG
2 SPRAY, SUSPENSION (ML) NASAL DAILY
Qty: 16 G | Refills: 5 | Status: SHIPPED | OUTPATIENT
Start: 2023-01-27

## 2023-01-27 RX ORDER — ATORVASTATIN CALCIUM 20 MG/1
20 TABLET, FILM COATED ORAL DAILY
Qty: 30 TABLET | Refills: 5 | Status: SHIPPED | OUTPATIENT
Start: 2023-01-27

## 2023-01-27 NOTE — PROGRESS NOTES
Chief Complaint  Hyperlipidemia (Refills )    Subjective          Andry Harper presents to [facility]  Hyperlipidemia  This is a chronic problem. The current episode started more than 1 year ago. The problem is uncontrolled. Recent lipid tests were reviewed and are variable. There are no known factors aggravating his hyperlipidemia. Pertinent negatives include no chest pain, focal sensory loss, focal weakness, leg pain, myalgias or shortness of breath. Current antihyperlipidemic treatment includes statins. The current treatment provides significant improvement of lipids. There are no compliance problems.  Risk factors for coronary artery disease include dyslipidemia, family history and male sex.       Objective   Allergies   Allergen Reactions   • Penicillins Unknown - Low Severity     Immunization History   Administered Date(s) Administered   • COVID-19 (PFIZER) PURPLE CAP 04/05/2021, 04/26/2021   • Flu Vaccine Intradermal Quad 18-64YR 10/07/2020   • Flu Vaccine Quad PF >36MO 10/01/2018, 09/30/2019   • FluLaval/Fluzone >6mos 10/21/2022   • Influenza, Unspecified 11/09/2021   • Pneumococcal Polysaccharide (PPSV23) 08/25/2021     Past Medical History:   Diagnosis Date   • Anemia    • AVM (arteriovenous malformation)     cecal AVM   • Hyperlipidemia    • Hypertension    • Lung disease       Past Surgical History:   Procedure Laterality Date   • COLONOSCOPY     • CYST REMOVAL      Neck   • LUNG BIOPSY Right       Social History     Socioeconomic History   • Marital status: Single   Tobacco Use   • Smoking status: Never   • Smokeless tobacco: Never   Vaping Use   • Vaping Use: Never used   Substance and Sexual Activity   • Alcohol use: Not Currently   • Drug use: Never   • Sexual activity: Defer        Current Outpatient Medications:   •  albuterol sulfate  (90 Base) MCG/ACT inhaler, Ventolin HFA 90 mcg/actuation inhalation HFA aerosol inhaler inhale 1 puff (90 mcg) by  "inhalation route every 4-6 hours as needed   Active, Disp: , Rfl:   •  atorvastatin (LIPITOR) 20 MG tablet, Take 1 tablet by mouth Daily., Disp: 30 tablet, Rfl: 5  •  budesonide-formoterol (SYMBICORT) 160-4.5 MCG/ACT inhaler, Inhale 2 puffs 2 (Two) Times a Day., Disp: 1 each, Rfl: 11  •  fluticasone (Flonase) 50 MCG/ACT nasal spray, 2 sprays into the nostril(s) as directed by provider Daily., Disp: 16 g, Rfl: 5  •  tamsulosin (FLOMAX) 0.4 MG capsule 24 hr capsule, Take 2 capsules by mouth Daily., Disp: 180 capsule, Rfl: 3  •  levocetirizine (Xyzal) 5 MG tablet, Take 1 tablet by mouth Every Evening., Disp: 30 tablet, Rfl: 5  •  vitamin D (ERGOCALCIFEROL) 1.25 MG (60018 UT) capsule capsule, Take 1 capsule by mouth Every 7 (Seven) Days for 15 doses., Disp: 5 capsule, Rfl: 2   Family History   Problem Relation Age of Onset   • Lymphoma Father    • Cancer Father    • Stroke Mother    • Heart disease Brother    • Heart attack Brother    • Stroke Brother    • Diabetes Brother    • Cancer Brother    • Kidney disease Brother           Vital Signs:   Vitals:    01/27/23 1644   BP: 120/72   BP Location: Left arm   Pulse: 100   Temp: 98 °F (36.7 °C)   SpO2: 98%   Weight: 76.2 kg (168 lb)   Height: 177.8 cm (70\")       Review of Systems   Constitutional: Negative for fatigue and fever.   Eyes: Negative for visual disturbance.   Respiratory: Negative for cough, chest tightness, shortness of breath and wheezing.    Cardiovascular: Negative for chest pain, palpitations and leg swelling.   Gastrointestinal: Negative for abdominal pain, diarrhea, nausea and vomiting.   Musculoskeletal: Negative for myalgias.   Neurological: Negative for focal weakness, light-headedness and headaches.      Physical Exam  Vitals reviewed.   Constitutional:       Appearance: Normal appearance. He is well-developed.   HENT:      Head: Normocephalic and atraumatic.      Right Ear: External ear normal.      Left Ear: External ear normal.      Mouth/Throat: "      Pharynx: No oropharyngeal exudate.   Eyes:      Conjunctiva/sclera: Conjunctivae normal.      Pupils: Pupils are equal, round, and reactive to light.   Cardiovascular:      Rate and Rhythm: Normal rate and regular rhythm.      Pulses: Normal pulses.      Heart sounds: Normal heart sounds. No murmur heard.    No friction rub. No gallop.   Pulmonary:      Effort: Pulmonary effort is normal.      Breath sounds: Normal breath sounds. No wheezing or rhonchi.   Abdominal:      General: Abdomen is flat. Bowel sounds are normal. There is no distension.      Palpations: Abdomen is soft. There is no mass.      Tenderness: There is no abdominal tenderness. There is no guarding or rebound.      Hernia: No hernia is present.   Musculoskeletal:         General: Normal range of motion.   Skin:     General: Skin is warm and dry.      Capillary Refill: Capillary refill takes less than 2 seconds.   Neurological:      General: No focal deficit present.      Mental Status: He is alert and oriented to person, place, and time.      Cranial Nerves: No cranial nerve deficit.   Psychiatric:         Mood and Affect: Mood and affect normal.         Behavior: Behavior normal.         Thought Content: Thought content normal.         Judgment: Judgment normal.        Result Review :   The following data was reviewed by: Keenan Craft MD on 01/27/2023:  CMP    CMP 3/5/22 9/16/22   Glucose 107 (A) 105 (A)   BUN 13 10   Creatinine 0.65 (A) 0.79   eGFR 105.9 99.2   Sodium 140 139   Potassium 4.6 4.3   Chloride 105 102   Calcium 9.1 8.9   Total Protein 6.6 6.2   Albumin 4.20 4.00   Globulin 2.4 2.2   Total Bilirubin 0.7 0.8   Alkaline Phosphatase 79 82   AST (SGOT) 20 20   ALT (SGPT) 24 17   Albumin/Globulin Ratio 1.8 1.8   BUN/Creatinine Ratio 20.0 12.7   Anion Gap 6.0 8.2   (A) Abnormal value       Comments are available for some flowsheets but are not being displayed.           CBC    CBC 3/5/22 6/14/22 9/16/22   WBC 3.97 9.09 4.58   RBC  4.72 4.71 4.71   Hemoglobin 14.7 14.9 15.0   Hematocrit 44.2 44.0 43.2   MCV 93.6 93.4 91.7   MCH 31.1 31.6 31.8   MCHC 33.3 33.9 34.7   RDW 13.1 13.2 13.1   Platelets 116 (A) 115 (A) 105 (A)   (A) Abnormal value            Lipid Panel    Lipid Panel 3/5/22 9/16/22   Total Cholesterol 135 151   Triglycerides 55 62   HDL Cholesterol 40 37 (A)   VLDL Cholesterol 12 13   LDL Cholesterol  83 101 (A)   LDL/HDL Ratio 2.10 2.75   (A) Abnormal value                          Assessment and Plan    Diagnoses and all orders for this visit:    1. Screen for colon cancer (Primary)  -     Ambulatory Referral For Screening Colonoscopy    2. Mixed hyperlipidemia  -     atorvastatin (LIPITOR) 20 MG tablet; Take 1 tablet by mouth Daily.  Dispense: 30 tablet; Refill: 5  -     Comprehensive Metabolic Panel  -     Lipid Panel    3. Non-seasonal allergic rhinitis, unspecified trigger  -     fluticasone (Flonase) 50 MCG/ACT nasal spray; 2 sprays into the nostril(s) as directed by provider Daily.  Dispense: 16 g; Refill: 5  -     CBC Auto Differential  -     Comprehensive Metabolic Panel            Follow Up   Return in about 6 months (around 7/27/2023) for Recheck.  Patient was given instructions and counseling regarding his condition or for health maintenance advice. Please see specific information pulled into the AVS if appropriate.

## 2023-01-28 ENCOUNTER — CLINICAL SUPPORT (OUTPATIENT)
Dept: FAMILY MEDICINE CLINIC | Facility: CLINIC | Age: 63
End: 2023-01-28
Payer: COMMERCIAL

## 2023-01-28 DIAGNOSIS — E78.2 MIXED HYPERLIPIDEMIA: ICD-10-CM

## 2023-01-28 PROCEDURE — 80053 COMPREHEN METABOLIC PANEL: CPT | Performed by: FAMILY MEDICINE

## 2023-01-28 PROCEDURE — 85025 COMPLETE CBC W/AUTO DIFF WBC: CPT | Performed by: FAMILY MEDICINE

## 2023-01-28 PROCEDURE — 80061 LIPID PANEL: CPT | Performed by: FAMILY MEDICINE

## 2023-01-28 PROCEDURE — 36415 COLL VENOUS BLD VENIPUNCTURE: CPT | Performed by: FAMILY MEDICINE

## 2023-01-28 NOTE — PROGRESS NOTES
Venipuncture Blood Specimen Collection  Venipuncture performed in left arm by Iram Gao with good hemostasis. Patient tolerated the procedure well without complications.   01/28/23   Iram Gao

## 2023-01-29 LAB
ALBUMIN SERPL-MCNC: 4.1 G/DL (ref 3.5–5.2)
ALBUMIN/GLOB SERPL: 1.6 G/DL
ALP SERPL-CCNC: 84 U/L (ref 39–117)
ALT SERPL W P-5'-P-CCNC: 19 U/L (ref 1–41)
ANION GAP SERPL CALCULATED.3IONS-SCNC: 10.9 MMOL/L (ref 5–15)
AST SERPL-CCNC: 17 U/L (ref 1–40)
BASOPHILS # BLD AUTO: 0.02 10*3/MM3 (ref 0–0.2)
BASOPHILS NFR BLD AUTO: 0.4 % (ref 0–1.5)
BILIRUB SERPL-MCNC: 0.7 MG/DL (ref 0–1.2)
BUN SERPL-MCNC: 18 MG/DL (ref 8–23)
BUN/CREAT SERPL: 19.6 (ref 7–25)
CALCIUM SPEC-SCNC: 9.2 MG/DL (ref 8.6–10.5)
CHLORIDE SERPL-SCNC: 104 MMOL/L (ref 98–107)
CHOLEST SERPL-MCNC: 115 MG/DL (ref 0–200)
CO2 SERPL-SCNC: 28.1 MMOL/L (ref 22–29)
CREAT SERPL-MCNC: 0.92 MG/DL (ref 0.76–1.27)
DEPRECATED RDW RBC AUTO: 45.3 FL (ref 37–54)
EGFRCR SERPLBLD CKD-EPI 2021: 92.9 ML/MIN/1.73
EOSINOPHIL # BLD AUTO: 0.21 10*3/MM3 (ref 0–0.4)
EOSINOPHIL NFR BLD AUTO: 3.9 % (ref 0.3–6.2)
ERYTHROCYTE [DISTWIDTH] IN BLOOD BY AUTOMATED COUNT: 13.4 % (ref 12.3–15.4)
GLOBULIN UR ELPH-MCNC: 2.5 GM/DL
GLUCOSE SERPL-MCNC: 98 MG/DL (ref 65–99)
HCT VFR BLD AUTO: 42.2 % (ref 37.5–51)
HDLC SERPL-MCNC: 41 MG/DL (ref 40–60)
HGB BLD-MCNC: 14.3 G/DL (ref 13–17.7)
IMM GRANULOCYTES # BLD AUTO: 0.02 10*3/MM3 (ref 0–0.05)
IMM GRANULOCYTES NFR BLD AUTO: 0.4 % (ref 0–0.5)
LDLC SERPL CALC-MCNC: 62 MG/DL (ref 0–100)
LDLC/HDLC SERPL: 1.56 {RATIO}
LYMPHOCYTES # BLD AUTO: 2.52 10*3/MM3 (ref 0.7–3.1)
LYMPHOCYTES NFR BLD AUTO: 46.6 % (ref 19.6–45.3)
MCH RBC QN AUTO: 31 PG (ref 26.6–33)
MCHC RBC AUTO-ENTMCNC: 33.9 G/DL (ref 31.5–35.7)
MCV RBC AUTO: 91.5 FL (ref 79–97)
MONOCYTES # BLD AUTO: 0.4 10*3/MM3 (ref 0.1–0.9)
MONOCYTES NFR BLD AUTO: 7.4 % (ref 5–12)
NEUTROPHILS NFR BLD AUTO: 2.24 10*3/MM3 (ref 1.7–7)
NEUTROPHILS NFR BLD AUTO: 41.3 % (ref 42.7–76)
NRBC BLD AUTO-RTO: 0.2 /100 WBC (ref 0–0.2)
PLATELET # BLD AUTO: 121 10*3/MM3 (ref 140–450)
PMV BLD AUTO: 11.6 FL (ref 6–12)
POTASSIUM SERPL-SCNC: 4.4 MMOL/L (ref 3.5–5.2)
PROT SERPL-MCNC: 6.6 G/DL (ref 6–8.5)
RBC # BLD AUTO: 4.61 10*6/MM3 (ref 4.14–5.8)
SODIUM SERPL-SCNC: 143 MMOL/L (ref 136–145)
TRIGL SERPL-MCNC: 50 MG/DL (ref 0–150)
VLDLC SERPL-MCNC: 12 MG/DL (ref 5–40)
WBC NRBC COR # BLD: 5.41 10*3/MM3 (ref 3.4–10.8)

## 2023-02-07 DIAGNOSIS — D64.9 ANEMIA, UNSPECIFIED TYPE: Primary | ICD-10-CM

## 2023-02-08 ENCOUNTER — OFFICE VISIT (OUTPATIENT)
Dept: ONCOLOGY | Facility: HOSPITAL | Age: 63
End: 2023-02-08
Payer: COMMERCIAL

## 2023-02-08 ENCOUNTER — LAB (OUTPATIENT)
Dept: LAB | Facility: HOSPITAL | Age: 63
End: 2023-02-08
Payer: COMMERCIAL

## 2023-02-08 VITALS
HEART RATE: 62 BPM | DIASTOLIC BLOOD PRESSURE: 56 MMHG | WEIGHT: 167.33 LBS | BODY MASS INDEX: 24.01 KG/M2 | RESPIRATION RATE: 18 BRPM | OXYGEN SATURATION: 98 % | TEMPERATURE: 97.6 F | SYSTOLIC BLOOD PRESSURE: 117 MMHG

## 2023-02-08 DIAGNOSIS — D69.6 THROMBOCYTOPENIA: Primary | ICD-10-CM

## 2023-02-08 DIAGNOSIS — R97.20 ELEVATED PSA: ICD-10-CM

## 2023-02-08 DIAGNOSIS — D64.9 ANEMIA, UNSPECIFIED TYPE: ICD-10-CM

## 2023-02-08 LAB
BASOPHILS # BLD AUTO: 0.02 10*3/MM3 (ref 0–0.2)
BASOPHILS NFR BLD AUTO: 0.4 % (ref 0–1.5)
DEPRECATED RDW RBC AUTO: 43 FL (ref 37–54)
EOSINOPHIL # BLD AUTO: 0.14 10*3/MM3 (ref 0–0.4)
EOSINOPHIL NFR BLD AUTO: 2.6 % (ref 0.3–6.2)
ERYTHROCYTE [DISTWIDTH] IN BLOOD BY AUTOMATED COUNT: 13.1 % (ref 12.3–15.4)
FERRITIN SERPL-MCNC: 475.9 NG/ML (ref 30–400)
FOLATE SERPL-MCNC: 14.8 NG/ML (ref 4.78–24.2)
HCT VFR BLD AUTO: 41.6 % (ref 37.5–51)
HGB BLD-MCNC: 14.3 G/DL (ref 13–17.7)
IMM GRANULOCYTES # BLD AUTO: 0.02 10*3/MM3 (ref 0–0.05)
IMM GRANULOCYTES NFR BLD AUTO: 0.4 % (ref 0–0.5)
IRON 24H UR-MRATE: 93 MCG/DL (ref 59–158)
IRON SATN MFR SERPL: 26 % (ref 20–50)
LYMPHOCYTES # BLD AUTO: 2.26 10*3/MM3 (ref 0.7–3.1)
LYMPHOCYTES NFR BLD AUTO: 42.6 % (ref 19.6–45.3)
MCH RBC QN AUTO: 30.9 PG (ref 26.6–33)
MCHC RBC AUTO-ENTMCNC: 34.4 G/DL (ref 31.5–35.7)
MCV RBC AUTO: 89.8 FL (ref 79–97)
MONOCYTES # BLD AUTO: 0.39 10*3/MM3 (ref 0.1–0.9)
MONOCYTES NFR BLD AUTO: 7.4 % (ref 5–12)
NEUTROPHILS NFR BLD AUTO: 2.47 10*3/MM3 (ref 1.7–7)
NEUTROPHILS NFR BLD AUTO: 46.6 % (ref 42.7–76)
NRBC BLD AUTO-RTO: 0 /100 WBC (ref 0–0.2)
PLATELET # BLD AUTO: 109 10*3/MM3 (ref 140–450)
PMV BLD AUTO: 11.4 FL (ref 6–12)
PSA SERPL-MCNC: 8.87 NG/ML (ref 0–4)
RBC # BLD AUTO: 4.63 10*6/MM3 (ref 4.14–5.8)
TIBC SERPL-MCNC: 362 MCG/DL (ref 298–536)
TRANSFERRIN SERPL-MCNC: 243 MG/DL (ref 200–360)
VIT B12 BLD-MCNC: 374 PG/ML (ref 211–946)
WBC NRBC COR # BLD: 5.3 10*3/MM3 (ref 3.4–10.8)

## 2023-02-08 PROCEDURE — 83540 ASSAY OF IRON: CPT

## 2023-02-08 PROCEDURE — 82728 ASSAY OF FERRITIN: CPT

## 2023-02-08 PROCEDURE — G0463 HOSPITAL OUTPT CLINIC VISIT: HCPCS | Performed by: NURSE PRACTITIONER

## 2023-02-08 PROCEDURE — 85025 COMPLETE CBC W/AUTO DIFF WBC: CPT

## 2023-02-08 PROCEDURE — 84153 ASSAY OF PSA TOTAL: CPT

## 2023-02-08 PROCEDURE — 36415 COLL VENOUS BLD VENIPUNCTURE: CPT

## 2023-02-08 PROCEDURE — 99213 OFFICE O/P EST LOW 20 MIN: CPT | Performed by: NURSE PRACTITIONER

## 2023-02-08 PROCEDURE — 84466 ASSAY OF TRANSFERRIN: CPT

## 2023-02-08 PROCEDURE — 82607 VITAMIN B-12: CPT

## 2023-02-08 PROCEDURE — 82746 ASSAY OF FOLIC ACID SERUM: CPT

## 2023-04-28 ENCOUNTER — TELEPHONE (OUTPATIENT)
Dept: GASTROENTEROLOGY | Facility: CLINIC | Age: 63
End: 2023-04-28
Payer: COMMERCIAL

## 2023-04-28 NOTE — TELEPHONE ENCOUNTER
CALLED PT FOR DA SCREENING PHONE CALL APPT - NO ANSWER ON HOME PHONE -LMOVM    CALLED MOBILE NUMBER - UNABLE TO LEAVE MESSAGE -  NOT SET UP

## 2023-05-05 ENCOUNTER — CLINICAL SUPPORT (OUTPATIENT)
Dept: GASTROENTEROLOGY | Facility: CLINIC | Age: 63
End: 2023-05-05

## 2023-05-05 ENCOUNTER — PREP FOR SURGERY (OUTPATIENT)
Dept: OTHER | Facility: HOSPITAL | Age: 63
End: 2023-05-05
Payer: COMMERCIAL

## 2023-05-05 DIAGNOSIS — Z86.010 HISTORY OF COLON POLYPS: Primary | ICD-10-CM

## 2023-05-05 PROBLEM — Z86.0100 HISTORY OF COLON POLYPS: Status: ACTIVE | Noted: 2023-05-05

## 2023-05-05 NOTE — PROGRESS NOTES
Andry Harper  1960  62 y.o.    Reason for call: Recall 5 YR COLON RECALL    Prep prescribed: [place]  Prep instructions reviewed with patient and sent to patient via regular mail to the home address on file    Clearance needed? PULM -[facility]    The patient has been scheduled for: Colonoscopy  Procedure Date: 8/14/2023    Family history of colon cancer? No  If yes, indicate relative:     Family History   Problem Relation Age of Onset   • Stroke Mother    • Lymphoma Father    • Cancer Father    • Heart disease Brother    • Heart attack Brother    • Stroke Brother    • Diabetes Brother    • Cancer Brother    • Kidney disease Brother    • Colon cancer Neg Hx      Past Medical History:   Diagnosis Date   • Anemia    • AVM (arteriovenous malformation)     cecal AVM   • Colon polyp    • Hyperlipidemia    • Hypertension    • Lung disease      Allergies   Allergen Reactions   • Penicillins Unknown - Low Severity     Past Surgical History:   Procedure Laterality Date   • COLONOSCOPY  10/26/2018    5 YR RECALL - POLYPS   • CYST REMOVAL      Neck   • LUNG BIOPSY Right      Social History     Socioeconomic History   • Marital status: Single   Tobacco Use   • Smoking status: Never   • Smokeless tobacco: Never   Vaping Use   • Vaping Use: Never used   Substance and Sexual Activity   • Alcohol use: Not Currently   • Drug use: Never   • Sexual activity: Defer       Current Outpatient Medications:   •  albuterol sulfate  (90 Base) MCG/ACT inhaler, Ventolin HFA 90 mcg/actuation inhalation HFA aerosol inhaler inhale 1 puff (90 mcg) by inhalation route every 4-6 hours as needed   Active, Disp: , Rfl:   •  atorvastatin (LIPITOR) 20 MG tablet, Take 1 tablet by mouth Daily., Disp: 30 tablet, Rfl: 5  •  budesonide-formoterol (SYMBICORT) 160-4.5 MCG/ACT inhaler, Inhale 2 puffs 2 (Two) Times a Day., Disp: 1 each, Rfl: 11  •  fluticasone (Flonase) 50 MCG/ACT nasal spray, 2 sprays into the nostril(s) as directed by provider  Daily., Disp: 16 g, Rfl: 5  •  levocetirizine (Xyzal) 5 MG tablet, Take 1 tablet by mouth Every Evening., Disp: 30 tablet, Rfl: 5  •  tamsulosin (FLOMAX) 0.4 MG capsule 24 hr capsule, Take 2 capsules by mouth Daily., Disp: 180 capsule, Rfl: 3  •  vitamin D (ERGOCALCIFEROL) 1.25 MG (84386 UT) capsule capsule, Take 1 capsule by mouth Every 7 (Seven) Days for 15 doses., Disp: 5 capsule, Rfl: 2

## 2023-05-26 ENCOUNTER — TELEPHONE (OUTPATIENT)
Dept: GASTROENTEROLOGY | Facility: CLINIC | Age: 63
End: 2023-05-26
Payer: COMMERCIAL

## 2023-05-30 ENCOUNTER — TELEPHONE (OUTPATIENT)
Dept: PULMONOLOGY | Facility: CLINIC | Age: 63
End: 2023-05-30

## 2023-05-30 NOTE — TELEPHONE ENCOUNTER
Andry Charlie Harper  1960 is needing Pulmonary clearance for endoscopy and colonoscopy, Please advise.

## 2023-06-12 ENCOUNTER — TELEPHONE (OUTPATIENT)
Dept: UROLOGY | Facility: CLINIC | Age: 63
End: 2023-06-12
Payer: COMMERCIAL

## 2023-06-12 DIAGNOSIS — R97.20 ELEVATED PROSTATE SPECIFIC ANTIGEN (PSA): Primary | ICD-10-CM

## 2023-06-12 NOTE — TELEPHONE ENCOUNTER
Pt is asking for someone to call him. He did not want to give a lot of detail. He said that he wanted to see about getting an x-ray of his prostate. When I asked if he was having any problem he said that when he urinates he has a foul odor. I told him that someone from her clinic would call and he could give them the details.

## 2023-06-12 NOTE — TELEPHONE ENCOUNTER
Patient was requesting a mri of the prostate. Appt made for 7/26 23 arriving at 615 pm. I then rescheduled his appt with Dr. Hightower to 7/28/23 at 930. Patient verbalized understanding.

## 2023-07-19 NOTE — PROGRESS NOTES
Chief Complaint/Reason for Referral:  Iron Deficiency Anemia     Keenan Craft MD Smith, Robert Eugene, MD      Subjective    History of Present Illness     Mr. Andry Harper presents for 1 year follow up for anemia and thrombocytopenia. Reports he is planning to retire soon and is following with all of his specialties. He does report he has had some shortness of breath with activity and follows with Dr. Bryant. It appears he has a follow up for 8/2023.     Denies any GI blood loss. History of bleeding AVM's in the past. Weight is quite stable.     Lab work: 2/8/2023: iron normal at 93, ferritin of 475 is elevated, iron sat is normal at 26%. Hemoglobin of 14.3 on 1/28/23 is normal.       Oncology/Hematology History    No history exists.          Oncology/Hematology History     No history exists.   Mr. Harper is a well known patient to [facility] for previous evaluation of     thrombocytopenia which resolved.  Most recently found to have iron deficiency.      In October 2018 Iron was 43  TIBC 347 and Percent saturation 12.  H/H was 14.9 /    43.2 MCV 89.      He underwent EGD and colonoscopy 10/26/2018.  Colonoscopy with 2 polyps and a     cecal AVM was noted and bleeding this was cauterized. EGD revealed a hiatal her    misha.              In December 2018 Iron was 59  TIBC 405 and Percent saturation 19 with H/H     13.9/40.4 and Platelet count 150,000.  This was repeated again in January 2019     with Iron 50 TIBC 383 Percent sat 15  H/H 14.8/42 and Platelet count 157,000.      He started on oral iron 2 months ago.  Denies hematemesis, melena and BRB with     BMs.  He denies fatigue and pagophagia.      Review of Systems   Constitutional: Positive for fatigue. Negative for appetite change, diaphoresis, fever, unexpected weight gain and unexpected weight loss.   HENT: Negative for hearing loss, sore throat and voice change.    Eyes: Negative for blurred vision, double vision, pain, redness and visual disturbance.    Respiratory: Negative for cough, shortness of breath and wheezing.    Cardiovascular: Negative for chest pain, palpitations and leg swelling.   Endocrine: Negative for cold intolerance, heat intolerance, polydipsia and polyuria.   Genitourinary: Negative for decreased urine volume, difficulty urinating, frequency and urinary incontinence.   Musculoskeletal: Negative for arthralgias, back pain, joint swelling and myalgias.   Skin: Negative for color change, rash, skin lesions and wound.   Neurological: Negative for dizziness, seizures, numbness and headache.   Hematological: Negative for adenopathy. Does not bruise/bleed easily.   Psychiatric/Behavioral: Negative for depressed mood. The patient is not nervous/anxious.    All other systems reviewed and are negative.      Current Outpatient Medications on File Prior to Visit   Medication Sig Dispense Refill   • albuterol sulfate  (90 Base) MCG/ACT inhaler Ventolin HFA 90 mcg/actuation inhalation HFA aerosol inhaler inhale 1 puff (90 mcg) by inhalation route every 4-6 hours as needed   Active     • atorvastatin (LIPITOR) 20 MG tablet Take 1 tablet by mouth Daily. 30 tablet 5   • budesonide-formoterol (SYMBICORT) 160-4.5 MCG/ACT inhaler Inhale 2 puffs 2 (Two) Times a Day. 1 each 11   • fluticasone (Flonase) 50 MCG/ACT nasal spray 2 sprays into the nostril(s) as directed by provider Daily. 16 g 5   • levocetirizine (Xyzal) 5 MG tablet Take 1 tablet by mouth Every Evening. 30 tablet 5   • tamsulosin (FLOMAX) 0.4 MG capsule 24 hr capsule Take 2 capsules by mouth Daily. 180 capsule 3   • vitamin D (ERGOCALCIFEROL) 1.25 MG (37060 UT) capsule capsule Take 1 capsule by mouth Every 7 (Seven) Days for 15 doses. 5 capsule 2     No current facility-administered medications on file prior to visit.       Allergies   Allergen Reactions   • Penicillins Unknown - Low Severity     Past Medical History:   Diagnosis Date   • Anemia    • AVM (arteriovenous malformation)     cecal  AVM   • Hyperlipidemia    • Hypertension    • Lung disease      Past Surgical History:   Procedure Laterality Date   • COLONOSCOPY     • CYST REMOVAL      Neck   • LUNG BIOPSY Right      Social History     Socioeconomic History   • Marital status: Single   Tobacco Use   • Smoking status: Never   • Smokeless tobacco: Never   Vaping Use   • Vaping Use: Never used   Substance and Sexual Activity   • Alcohol use: Not Currently   • Drug use: Never   • Sexual activity: Defer     Family History   Problem Relation Age of Onset   • Lymphoma Father    • Cancer Father    • Stroke Mother    • Heart disease Brother    • Heart attack Brother    • Stroke Brother    • Diabetes Brother    • Cancer Brother    • Kidney disease Brother      Immunization History   Administered Date(s) Administered   • COVID-19 (PFIZER) PURPLE CAP 04/05/2021, 04/26/2021   • Flu Vaccine Intradermal Quad 18-64YR 10/07/2020   • Flu Vaccine Quad PF >36MO 10/01/2018, 09/30/2019   • FluLaval/Fluzone >6mos 10/21/2022   • Influenza, Unspecified 11/09/2021   • Pneumococcal Polysaccharide (PPSV23) 08/25/2021       Tobacco Use: Low Risk    • Smoking Tobacco Use: Never   • Smokeless Tobacco Use: Never   • Passive Exposure: Not on file       Objective     Physical Exam  Vitals and nursing note reviewed.   Constitutional:       Appearance: Normal appearance. He is normal weight.   HENT:      Head: Normocephalic.      Nose: Nose normal.      Mouth/Throat:      Mouth: Mucous membranes are moist.   Eyes:      Pupils: Pupils are equal, round, and reactive to light.   Cardiovascular:      Rate and Rhythm: Normal rate and regular rhythm.      Pulses: Normal pulses.      Heart sounds: Normal heart sounds.   Pulmonary:      Effort: Pulmonary effort is normal. No respiratory distress.      Breath sounds: Wheezing (wheezes in the bases posteriorly) present.   Abdominal:      Palpations: Abdomen is soft.   Musculoskeletal:         General: Normal range of motion.      Cervical  back: Normal range of motion and neck supple.   Skin:     General: Skin is warm and dry.      Capillary Refill: Capillary refill takes less than 2 seconds.   Neurological:      General: No focal deficit present.      Mental Status: He is alert and oriented to person, place, and time.   Psychiatric:         Mood and Affect: Mood normal.         Behavior: Behavior normal.         Thought Content: Thought content normal.         Judgment: Judgment normal.         Vitals:    02/08/23 1529   BP: 117/56   Pulse: 62   Resp: 18   Temp: 97.6 °F (36.4 °C)   TempSrc: Temporal   SpO2: 98%   Weight: 75.9 kg (167 lb 5.3 oz)   PainSc: 0-No pain       Wt Readings from Last 3 Encounters:   02/08/23 75.9 kg (167 lb 5.3 oz)   01/27/23 76.2 kg (168 lb)   12/19/22 75.2 kg (165 lb 12.8 oz)        BMI is within normal parameters. No other follow-up for BMI required.       ECOG score: 0         ECOG: (0) Fully Active - Able to Carry On All Pre-disease Performance Without Restriction  Fall Risk Assessment was completed, and patient is at low risk for falls.  PHQ-9 Total Score:         The patient is  experiencing fatigue. Fatigue score: 4    PT/OT Functional Screening: PT fx screen: No needs identified  Speech Functional Screening: Speech fx screen: No needs identified  Rehab to be ordered: Rehab to be ordered: No needs identified        Result Review :  The following data was reviewed by: MIKE Greenwood on 02/08/2023:  Lab Results   Component Value Date    HGB 14.3 01/28/2023    HCT 42.2 01/28/2023    MCV 91.5 01/28/2023     (L) 01/28/2023    WBC 5.41 01/28/2023    NEUTROABS 2.24 01/28/2023    LYMPHSABS 2.52 01/28/2023    MONOSABS 0.40 01/28/2023    EOSABS 0.21 01/28/2023    BASOSABS 0.02 01/28/2023     Lab Results   Component Value Date    GLUCOSE 98 01/28/2023    BUN 18 01/28/2023    CREATININE 0.92 01/28/2023     01/28/2023    K 4.4 01/28/2023     01/28/2023    CO2 28.1 01/28/2023    CALCIUM 9.2  01/28/2023    PROTEINTOT 6.6 01/28/2023    ALBUMIN 4.1 01/28/2023    BILITOT 0.7 01/28/2023    ALKPHOS 84 01/28/2023    AST 17 01/28/2023    ALT 19 01/28/2023     Lab Results   Component Value Date     07/29/2020    FERRITIN 475.90 (H) 02/08/2023    [id number] 329 03/05/2022    FOLATE 14.60 03/05/2022     Lab Results   Component Value Date    IRON 93 02/08/2023    LABIRON 26 02/08/2023    TRANSFERRIN 243 02/08/2023    TIBC 362 02/08/2023     Lab Results   Component Value Date     07/29/2020    FERRITIN 475.90 (H) 02/08/2023    [id number] 329 03/05/2022    FOLATE 14.60 03/05/2022     Lab Results   Component Value Date    PSA 9.740 (H) 12/12/2022       No Images in the past 120 days found..         Assessment and Plan:  Diagnoses and all orders for this visit:    1. Thrombocytopenia (HCC) (Primary)  -     CBC & Differential; Future      No evidence for anemia on recent CBC with normal hemoglobin, although his platelet count was mildly decreased at 120,000. He denies any bleeding or persistent bruising.     Follow up with GI as scheduled upcoming C scope.     Follow up with pulmonary for the SOA and PN's history.     Will see in 1 year with CBC prior to visit and follow up with .         Patient was given instructions and counseling regarding his condition or for health maintenance advice. Please see specific information pulled into the AVS if appropriate.     Dalia Rogers, APRN    2/8/2023    .tob     Cyclophosphamide Counseling:  I discussed with the patient the risks of cyclophosphamide including but not limited to hair loss, hormonal abnormalities, decreased fertility, abdominal pain, diarrhea, nausea and vomiting, bone marrow suppression and infection. The patient understands that monitoring is required while taking this medication.

## 2023-07-25 ENCOUNTER — LAB (OUTPATIENT)
Dept: LAB | Facility: HOSPITAL | Age: 63
End: 2023-07-25
Payer: MEDICARE

## 2023-07-25 DIAGNOSIS — D69.6 THROMBOCYTOPENIA: ICD-10-CM

## 2023-07-25 DIAGNOSIS — R97.20 ELEVATED PSA: ICD-10-CM

## 2023-07-25 DIAGNOSIS — R97.20 ELEVATED PSA: Primary | ICD-10-CM

## 2023-07-25 LAB
BASOPHILS # BLD AUTO: 0.02 10*3/MM3 (ref 0–0.2)
BASOPHILS NFR BLD AUTO: 0.4 % (ref 0–1.5)
DEPRECATED RDW RBC AUTO: 44.6 FL (ref 37–54)
EOSINOPHIL # BLD AUTO: 0.09 10*3/MM3 (ref 0–0.4)
EOSINOPHIL NFR BLD AUTO: 1.8 % (ref 0.3–6.2)
ERYTHROCYTE [DISTWIDTH] IN BLOOD BY AUTOMATED COUNT: 13.3 % (ref 12.3–15.4)
HCT VFR BLD AUTO: 41.9 % (ref 37.5–51)
HGB BLD-MCNC: 14.4 G/DL (ref 13–17.7)
LYMPHOCYTES # BLD AUTO: 1.85 10*3/MM3 (ref 0.7–3.1)
LYMPHOCYTES NFR BLD AUTO: 36.4 % (ref 19.6–45.3)
MCH RBC QN AUTO: 31.6 PG (ref 26.6–33)
MCHC RBC AUTO-ENTMCNC: 34.4 G/DL (ref 31.5–35.7)
MCV RBC AUTO: 91.9 FL (ref 79–97)
MONOCYTES # BLD AUTO: 0.34 10*3/MM3 (ref 0.1–0.9)
MONOCYTES NFR BLD AUTO: 6.7 % (ref 5–12)
NEUTROPHILS NFR BLD AUTO: 2.76 10*3/MM3 (ref 1.7–7)
NEUTROPHILS NFR BLD AUTO: 54.3 % (ref 42.7–76)
PLATELET # BLD AUTO: 97 10*3/MM3 (ref 140–450)
PMV BLD AUTO: 11.6 FL (ref 6–12)
PSA SERPL-MCNC: 7 NG/ML (ref 0–4)
RBC # BLD AUTO: 4.56 10*6/MM3 (ref 4.14–5.8)
WBC NRBC COR # BLD: 5.08 10*3/MM3 (ref 3.4–10.8)

## 2023-07-25 PROCEDURE — 36415 COLL VENOUS BLD VENIPUNCTURE: CPT

## 2023-07-25 PROCEDURE — 84153 ASSAY OF PSA TOTAL: CPT

## 2023-07-25 PROCEDURE — 85025 COMPLETE CBC W/AUTO DIFF WBC: CPT

## 2023-07-26 ENCOUNTER — HOSPITAL ENCOUNTER (OUTPATIENT)
Dept: MRI IMAGING | Facility: HOSPITAL | Age: 63
Discharge: HOME | End: 2023-07-26
Admitting: UROLOGY
Payer: MEDICARE

## 2023-07-26 DIAGNOSIS — R97.20 ELEVATED PROSTATE SPECIFIC ANTIGEN (PSA): ICD-10-CM

## 2023-07-26 PROCEDURE — A9577 INJ MULTIHANCE: HCPCS | Performed by: UROLOGY

## 2023-07-26 PROCEDURE — 72197 MRI PELVIS W/O & W/DYE: CPT

## 2023-07-26 PROCEDURE — 82565 ASSAY OF CREATININE: CPT

## 2023-07-26 PROCEDURE — 0 GADOBENATE DIMEGLUMINE 529 MG/ML SOLUTION: Performed by: UROLOGY

## 2023-07-26 RX ADMIN — GADOBENATE DIMEGLUMINE 15 ML: 529 INJECTION, SOLUTION INTRAVENOUS at 19:31

## 2023-07-27 LAB — CREAT BLDA-MCNC: 0.9 MG/DL (ref 0.6–1.3)

## 2023-07-28 ENCOUNTER — OFFICE VISIT (OUTPATIENT)
Dept: UROLOGY | Facility: CLINIC | Age: 63
End: 2023-07-28
Payer: MEDICARE

## 2023-07-28 VITALS — WEIGHT: 173.8 LBS | HEIGHT: 70 IN | BODY MASS INDEX: 24.88 KG/M2

## 2023-07-28 DIAGNOSIS — R97.20 ELEVATED PROSTATE SPECIFIC ANTIGEN (PSA): Primary | ICD-10-CM

## 2023-07-28 LAB
BILIRUB BLD-MCNC: NEGATIVE MG/DL
CLARITY, POC: CLEAR
COLOR UR: YELLOW
EXPIRATION DATE: NORMAL
GLUCOSE UR STRIP-MCNC: NEGATIVE MG/DL
KETONES UR QL: NEGATIVE
LEUKOCYTE EST, POC: NEGATIVE
Lab: NORMAL
NITRITE UR-MCNC: NEGATIVE MG/ML
PH UR: 6.5 [PH] (ref 5–8)
PROT UR STRIP-MCNC: NEGATIVE MG/DL
RBC # UR STRIP: NEGATIVE /UL
SP GR UR: 1 (ref 1–1.03)
UROBILINOGEN UR QL: NORMAL

## 2023-07-28 NOTE — PROGRESS NOTES
Chief Complaint  Elevated PSA    Subjective          Andry Harper presents to [facility]  History of Present Illness  History of Present Illness  6/4/19 - Patient presents in follow up. He is doing well. He denies bothersome urinary issues. He has not had a recent PSA.     7/22/19 - Patient presents in follow up. He is doing well. He has no urinary issues. He had an MRI of the prostate and this showed no areas suggestive of clinically significant prostate cancer. The volume of the gland was 77ml. The patient did have a recent PSA and this was 12. This was increased from 6 at the time of his biopsy. I have recommended following this.     12/18/2019: Patient is here for follow-up of his elevated PSA.  It was recently checked and is found to be 7.5 at this point.  That is down from 12.  It was at 6 at the time of his prostate biopsy which was negative.  He is also had an MRI of his prostate which showed no areas suggestive of clinically significant prostate cancer.     6/10/20:  He is having more issues with frequency and urgency.  He states he has a slow stream at times.  He would like to try medicine for that.  His most recent PSA was 8 which is down from a high of 12 but up from last check at 6.  He had a negative prostate MRI but that was over a year ago.       12/9/20:  His most recent PSA in Nov 2020 is now 7.71 which is down for him.  He had a negative prostate MRI in 2019 and again in June 2020.  He had a negative prostate biopsy in the past when his PSA was 6.  It has been as high as 12 in the past.     6/22/21:  Most recent PSA is now 10 in June 2021.  This is similar to his higher PSAs in the past.  His last prostate MRI was in June 2020 and was negative.  He has had two negative prostate MRIs.  He has also had a negative prostate biopsy in the past.      12/20/21: Most recent PSA from 12/13/2021 was 9.120, which is actually down a little bit from when he was seen in 06/2021, but  "is similar to his baseline, which he normally runs between 7 and 9. He inquires about making any changes. The patient reports occasional nocturia. He denies any changes in his flow of urine. He reports the amount he urinates varies. The patient inquires about the Flomax, changing doses and his PSA count. He notes decreased times of urination at night.     6/20/22:    Mr. Harper, 1960, is a follow-up for elevated PSA and BPH. His most recent PSA from 02/01/2022 was 9.2 ng/mL, which is around his baseline. He has had 2 negative prostate MRIs and a negative prostate biopsy as well.     12/19/22:    He reports that he is doing well. His PSA has been about the same the past 3 times it was checked.    7/28/23:  His most recent PSA is down again, now 7.  It was 8 in Dec 2022. He had a prostate MRI two days ago but is has not been read yet.  I will call him with those results.     Objective   Vital Signs:   Ht 177.8 cm (70\")   Wt 78.8 kg (173 lb 12.8 oz)   BMI 24.94 kg/m²       Physical Exam     Result Review :   The following data was reviewed by: Michelle Hightower MD on 07/28/2023:    Results for orders placed or performed in visit on 07/28/23   POC Urinalysis Dipstick, Automated    Specimen: Urine   Result Value Ref Range    Color Yellow Yellow, Straw, Dark Yellow, Nicole    Clarity, UA Clear Clear    Specific Gravity  1.005 1.005 - 1.030    pH, Urine 6.5 5.0 - 8.0    Leukocytes Negative Negative    Nitrite, UA Negative Negative    Protein, POC Negative Negative mg/dL    Glucose, UA Negative Negative mg/dL    Ketones, UA Negative Negative    Urobilinogen, UA 0.2 E.U./dL Normal, 0.2 E.U./dL    Bilirubin Negative Negative    Blood, UA Negative Negative    Lot Number [id number]     Expiration Date 72,024        PSA          12/12/2022    11:49 2/8/2023    08:59 7/25/2023    09:12   PSA   PSA 9.740  8.870  7.000        MRI of prostate report pending, I did review images myself today.            Assessment and Plan  "   Diagnoses and all orders for this visit:    1. Elevated prostate specific antigen (PSA) (Primary)  -     POC Urinalysis Dipstick, Automated    Follow up in 6 months with PSA prior, I will call him with MRI results.           Follow Up       No follow-ups on file.  Patient was given instructions and counseling regarding his condition or for health maintenance advice. Please see specific information pulled into the AVS if appropriate.

## 2023-08-01 ENCOUNTER — TELEPHONE (OUTPATIENT)
Dept: UROLOGY | Facility: CLINIC | Age: 63
End: 2023-08-01
Payer: MEDICARE

## 2023-08-14 ENCOUNTER — ANESTHESIA (OUTPATIENT)
Dept: GASTROENTEROLOGY | Facility: HOSPITAL | Age: 63
End: 2023-08-14
Payer: MEDICARE

## 2023-08-14 ENCOUNTER — HOSPITAL ENCOUNTER (OUTPATIENT)
Facility: HOSPITAL | Age: 63
Setting detail: HOSPITAL OUTPATIENT SURGERY
Discharge: HOME | End: 2023-08-14
Attending: INTERNAL MEDICINE | Admitting: INTERNAL MEDICINE
Payer: MEDICARE

## 2023-08-14 ENCOUNTER — ANESTHESIA EVENT (OUTPATIENT)
Dept: GASTROENTEROLOGY | Facility: HOSPITAL | Age: 63
End: 2023-08-14
Payer: MEDICARE

## 2023-08-14 VITALS
HEART RATE: 62 BPM | BODY MASS INDEX: 24.01 KG/M2 | DIASTOLIC BLOOD PRESSURE: 65 MMHG | RESPIRATION RATE: 14 BRPM | WEIGHT: 171.52 LBS | OXYGEN SATURATION: 96 % | TEMPERATURE: 97.6 F | SYSTOLIC BLOOD PRESSURE: 100 MMHG | HEIGHT: 71 IN

## 2023-08-14 DIAGNOSIS — Z86.010 HISTORY OF COLON POLYPS: ICD-10-CM

## 2023-08-14 PROCEDURE — 88305 TISSUE EXAM BY PATHOLOGIST: CPT | Performed by: INTERNAL MEDICINE

## 2023-08-14 PROCEDURE — 25010000002 PROPOFOL 10 MG/ML EMULSION: Performed by: NURSE ANESTHETIST, CERTIFIED REGISTERED

## 2023-08-14 RX ORDER — LIDOCAINE HYDROCHLORIDE 20 MG/ML
INJECTION, SOLUTION EPIDURAL; INFILTRATION; INTRACAUDAL; PERINEURAL AS NEEDED
Status: DISCONTINUED | OUTPATIENT
Start: 2023-08-14 | End: 2023-08-14 | Stop reason: SURG

## 2023-08-14 RX ORDER — SODIUM CHLORIDE, SODIUM LACTATE, POTASSIUM CHLORIDE, CALCIUM CHLORIDE 600; 310; 30; 20 MG/100ML; MG/100ML; MG/100ML; MG/100ML
30 INJECTION, SOLUTION INTRAVENOUS CONTINUOUS
Status: DISCONTINUED | OUTPATIENT
Start: 2023-08-14 | End: 2023-08-14 | Stop reason: HOSPADM

## 2023-08-14 RX ORDER — PROPOFOL 10 MG/ML
VIAL (ML) INTRAVENOUS AS NEEDED
Status: DISCONTINUED | OUTPATIENT
Start: 2023-08-14 | End: 2023-08-14 | Stop reason: SURG

## 2023-08-14 RX ADMIN — PROPOFOL 200 MCG/KG/MIN: 10 INJECTION, EMULSION INTRAVENOUS at 08:18

## 2023-08-14 RX ADMIN — PROPOFOL 70 MG: 10 INJECTION, EMULSION INTRAVENOUS at 08:18

## 2023-08-14 RX ADMIN — LIDOCAINE HYDROCHLORIDE 100 MG: 20 INJECTION, SOLUTION EPIDURAL; INFILTRATION; INTRACAUDAL; PERINEURAL at 08:18

## 2023-08-14 RX ADMIN — SODIUM CHLORIDE, POTASSIUM CHLORIDE, SODIUM LACTATE AND CALCIUM CHLORIDE 30 ML/HR: 600; 310; 30; 20 INJECTION, SOLUTION INTRAVENOUS at 07:08

## 2023-08-14 NOTE — ANESTHESIA POSTPROCEDURE EVALUATION
Patient: Andry Harper    Procedure Summary       Date: 08/14/23 Room / Location: [facility] ENDOSCOPY 4 / [facility]    Anesthesia Start: 0816 Anesthesia Stop: 0851    Procedure: COLONOSCOPY WITH POLYPECTOMY/SNARE Diagnosis:       History of colon polyps      (History of colon polyps [Z86.010])    Surgeons: Ion Carrera MD Provider: Jacob Wood MD    Anesthesia Type: general ASA Status: 3            Anesthesia Type: general    Vitals  Vitals Value Taken Time   /65 08/14/23 0910   Temp 36.4 øC (97.6 øF) 08/14/23 0910   Pulse 61 08/14/23 0912   Resp 14 08/14/23 0910   SpO2 98 % 08/14/23 0912   Vitals shown include unvalidated device data.        Post Anesthesia Care and Evaluation    Patient location during evaluation: bedside  Patient participation: complete - patient participated  Level of consciousness: awake  Pain management: adequate    Airway patency: patent  PONV Status: none  Cardiovascular status: acceptable and stable  Respiratory status: acceptable  Hydration status: acceptable    Comments: An Anesthesiologist personally participated in the most demanding procedures (including induction and emergence if applicable) in the anesthesia plan, monitored the course of anesthesia administration at frequent intervals and remained physically present and available for immediate diagnosis and treatment of emergencies.

## 2023-08-14 NOTE — ANESTHESIA PREPROCEDURE EVALUATION
Anesthesia Evaluation     Patient summary reviewed and Nursing notes reviewed   no history of anesthetic complications:   NPO Solid Status: > 8 hours  NPO Liquid Status: > 2 hours           Airway   Mallampati: II  TM distance: >3 FB  Neck ROM: full  No difficulty expected  Dental      Pulmonary - negative pulmonary ROS and normal exam    breath sounds clear to auscultation  Cardiovascular - normal exam  Exercise tolerance: good (4-7 METS)    Rhythm: regular  Rate: normal    (+) hypertension      Neuro/Psych- negative ROS  GI/Hepatic/Renal/Endo - negative ROS     Musculoskeletal (-) negative ROS    Abdominal    Substance History - negative use     OB/GYN negative ob/gyn ROS         Other - negative ROS       ROS/Med Hx Other: PAT Nursing Notes unavailable.                 Anesthesia Plan    ASA 3     general       Anesthetic plan, risks, benefits, and alternatives have been provided, discussed and informed consent has been obtained with: patient and sibling.    CODE STATUS:

## 2023-08-14 NOTE — H&P
Pre Procedure History & Physical    Chief Complaint:   History of colon polyps    Subjective     HPI:   History of colon polyps    Past Medical History:   Past Medical History:   Diagnosis Date    Anemia     AVM (arteriovenous malformation)     cecal AVM    Colon polyp     Hyperlipidemia     Hypertension     Lung disease        Past Surgical History:  Past Surgical History:   Procedure Laterality Date    COLONOSCOPY  10/26/2018    5 YR RECALL - POLYPS    CYST REMOVAL      Neck    LUNG BIOPSY Right        Family History:  Family History   Problem Relation Age of Onset    Stroke Mother     Lymphoma Father     Cancer Father     Heart disease Brother     Heart attack Brother     Stroke Brother     Diabetes Brother     Cancer Brother     Kidney disease Brother     Colon cancer Neg Hx        Social History:   reports that he has never smoked. He has never used smokeless tobacco. He reports that he does not currently use alcohol. He reports that he does not use drugs.    Medications:   Medications Prior to Admission   Medication Sig Dispense Refill Last Dose    albuterol sulfate  (90 Base) MCG/ACT inhaler Ventolin HFA 90 mcg/actuation inhalation HFA aerosol inhaler inhale 1 puff (90 mcg) by inhalation route every 4-6 hours as needed   Active   Past Month    atorvastatin (LIPITOR) 20 MG tablet Take 1 tablet by mouth Daily. 30 tablet 5 8/13/2023    budesonide-formoterol (SYMBICORT) 160-4.5 MCG/ACT inhaler Inhale 2 puffs 2 (Two) Times a Day. 1 each 11 8/13/2023    fluticasone (Flonase) 50 MCG/ACT nasal spray 2 sprays into the nostril(s) as directed by provider Daily. 16 g 5 8/13/2023    levocetirizine (Xyzal) 5 MG tablet Take 1 tablet by mouth Every Evening. 30 tablet 5 8/13/2023    tamsulosin (FLOMAX) 0.4 MG capsule 24 hr capsule Take 2 capsules by mouth Daily. 180 capsule 3 8/13/2023       Allergies:  Penicillins        Objective     Blood pressure 125/74, pulse 58, temperature 97.8 øF (36.6 øC), temperature source  "Temporal, resp. rate 18, height 180.3 cm (71\"), weight 77.8 kg (171 lb 8.3 oz), SpO2 96 %.    Physical Exam   Constitutional: Pt is oriented to person, place, and time and well-developed, well-nourished, and in no distress.   Mouth/Throat: Oropharynx is clear and moist.   Neck: Normal range of motion.   Cardiovascular: Normal rate, regular rhythm and normal heart sounds.    Pulmonary/Chest: Effort normal and breath sounds normal.   Abdominal: Soft. Nontender  Skin: Skin is warm and dry.   Psychiatric: Mood, memory, affect and judgment normal.     Assessment & Plan     Diagnosis:  Surveillance    Anticipated Surgical Procedure:  Colonoscopy    The risks, benefits, and alternatives of this procedure have been discussed with the patient or the responsible party- the patient understands and agrees to proceed.            "

## 2023-08-15 LAB
CYTO UR: NORMAL
LAB AP CASE REPORT: NORMAL
LAB AP CLINICAL INFORMATION: NORMAL
PATH REPORT.FINAL DX SPEC: NORMAL
PATH REPORT.GROSS SPEC: NORMAL

## 2023-08-17 ENCOUNTER — TELEPHONE (OUTPATIENT)
Dept: GASTROENTEROLOGY | Facility: CLINIC | Age: 63
End: 2023-08-17
Payer: MEDICARE

## 2023-08-17 DIAGNOSIS — Z86.010 HISTORY OF COLON POLYPS: Primary | ICD-10-CM

## 2023-08-17 NOTE — TELEPHONE ENCOUNTER
----- Message from MIKE Lala sent at 8/16/2023  8:36 AM EDT -----  I reviewed the patient's most recent colonoscopy report.  Patient had 1 benign polyp removed.  Colonoscopy could not be completed due to inadequate prep.  Please schedule patient for repeat colonoscopy in 2 months.  Troubleshoot any previous prep issues.

## 2023-08-17 NOTE — TELEPHONE ENCOUNTER
I have been unable to reach this patient by phone.  Home number goes straight to busy signal, attempted several time.  Mobile number is invalid.      I have left a message with alternate contact to verify correct phone number for patient.

## 2023-08-18 ENCOUNTER — PREP FOR SURGERY (OUTPATIENT)
Dept: OTHER | Facility: HOSPITAL | Age: 63
End: 2023-08-18
Payer: MEDICARE

## 2023-08-18 DIAGNOSIS — Z86.010 ENCOUNTER FOR COLONOSCOPY FOLLOWING COLON POLYP REMOVAL: Primary | ICD-10-CM

## 2023-08-18 DIAGNOSIS — Z09 ENCOUNTER FOR COLONOSCOPY FOLLOWING COLON POLYP REMOVAL: Primary | ICD-10-CM

## 2023-08-18 NOTE — TELEPHONE ENCOUNTER
"Patient returned call regarding results and plan of care.    Spoke to patient and informed of MIKE Lugo result note and recommendations. Patient verified understanding.     Patient agreeable to repeat colonoscopy.  In reviewing previous bowel prep instructions patient reports he was not on clear liquids fully the day prior, stating he \"ate breakfast then had chicken noodle soup\".  Patient reports he drank \"most of the prep\" but in one dose only.      Re-educated patient on bowel prep instructions.  Patient asked for a lower dose prep this time.  However, does stated that he tolerated it well.     CR entered for second sign.    Would patient be a candidate for lower volume prep?  Would you want to still ask for an extended prep as well?  "

## 2023-08-21 ENCOUNTER — TELEPHONE (OUTPATIENT)
Dept: GASTROENTEROLOGY | Facility: CLINIC | Age: 63
End: 2023-08-21
Payer: MEDICARE

## 2023-08-21 NOTE — TELEPHONE ENCOUNTER
Spoke to patient and informed of MIKE Lugo result note and recommendations. Patient verified understanding.     Colonoscopy scheduled on 10/16/23 with expected arrival-time of 0830. Plenvu ordered and sent to preferred pharmacy.      Educated patient on extended bowel prep instructions, need for  post-transportation, and to  the bowel prep from the pharmacy within the next 10 days.      Provided my direct line phone number for any issues.  Verified address and mailed extended bowel prep instructions, bowel prep brochure and recommended low residue diet information.    Pulmonology clearance requested from Dr. Bryant via TE dated 8/21 and routed to Fide HUGO RN.

## 2023-09-04 NOTE — PROGRESS NOTES
Primary Care Provider  Keenan Craft MD     Referring Provider  No ref. provider found     Chief Complaint  Follow-up (1 year follow-up), Shortness of Breath (On exertion), Wheezing (At night along with patient feeling like he chanda while lying down ), and pulmonary clearance (Colonoscopy in 2 weeks)    Subjective          History of Presenting Illness  Patient is a 63-year-old male, patient of Dr. Bryant's who presents for management of chronic dyspnea and has a history of chronic eosinophilic pneumonia and lipoid pneumonia who presents for a follow-up visit today.  Patient states that he does get short of breath that is worse with exertion and at times when laying down at night, mild in severity, and improved with rest.  Patient states that he also at times has wheezing when laying down at night.  Patient states that he has a Symbicort inhaler, however only admits to taking it 1-2 times a week.  Patient states that he does have an albuterol inhaler to take as needed.  Patient states that he is going to be having colonoscopy by Dr. Carrera and is needing pulmonary clearance.  Patient denies any reflux.  Patient states that he does have seasonal allergies and at times does have postnasal drip and a runny nose and is currently not taking any allergy medication. Patient denies fever, chills, night sweats, swollen glands in the head and neck, unintentional weight loss, hemoptysis, purulent sputum production, dysphagia, chest pain, palpitations, chest tightness, abdominal pain, nausea, vomiting, and diarrhea.  Patient also denies any myalgias, changes in sense of taste and/or smell, sore throat, any other coronavirus or flu-like symptoms.  Patient denies any leg swelling, orthopnea, paroxysmal nocturnal dyspnea.  Patient also denies any hematuria, hematochezia, hematemesis, and epistaxis.  Patient is able to perform activities of daily living.        Review of Systems   Constitutional:  Negative for activity  change, appetite change, chills, diaphoresis, fatigue, fever, unexpected weight gain and unexpected weight loss.        Negative for Insomnia   HENT:  Positive for postnasal drip (intermittent). Negative for congestion (Nasal), mouth sores, nosebleeds, sore throat, swollen glands and trouble swallowing.         Negative for Thrush  Negative for Hoarseness  Negative for Allergies/Hay Fever  Negative for Recent Head injury  Negative for Ear Fullness  Negative for Nasal or Sinus pain  Negative for Dry lips  Negative for Nasal discharge   Respiratory:  Positive for cough (intermittent), shortness of breath and wheezing. Negative for apnea and chest tightness.         Negative for Hemoptysis  Negative for Pleuritic pain   Cardiovascular:  Negative for chest pain, palpitations and leg swelling.        Negative for Claudication  Negative for Cyanosis  Negative for Dyspnea on exertion   Gastrointestinal:  Negative for abdominal pain, diarrhea, nausea, vomiting and GERD.   Musculoskeletal:  Negative for joint swelling and myalgias.        Negative for Joint pain  Negative for Joint stiffness   Skin:  Negative for color change, dry skin, pallor and rash.   Neurological:  Negative for syncope, weakness and headache.   Hematological:  Negative for adenopathy. Does not bruise/bleed easily.      Family History   Problem Relation Age of Onset    Stroke Mother     Lymphoma Father     Cancer Father     Heart disease Brother     Heart attack Brother     Stroke Brother     Diabetes Brother     Cancer Brother     Kidney disease Brother     Colon cancer Neg Hx         Social History     Socioeconomic History    Marital status: Single   Tobacco Use    Smoking status: Never    Smokeless tobacco: Never   Vaping Use    Vaping Use: Never used   Substance and Sexual Activity    Alcohol use: Not Currently    Drug use: Never    Sexual activity: Defer        Past Medical History:   Diagnosis Date    Anemia     AVM (arteriovenous malformation)      cecal AVM    Colon polyp     Hyperlipidemia     Hypertension     Lung disease         Immunization History   Administered Date(s) Administered    COVID-19 (PFIZER) Purple Cap Monovalent 04/05/2021, 04/26/2021    Flu Vaccine Intradermal Quad 18-64YR 10/07/2020    Flu Vaccine Quad PF >36MO 10/01/2018, 09/30/2019    Fluzone (or Fluarix & Flulaval for VFC) >6mos 10/21/2022    Influenza, Unspecified 11/09/2021    Pneumococcal Polysaccharide (PPSV23) 08/25/2021       Allergies   Allergen Reactions    Penicillins Unknown - Low Severity          Current Outpatient Medications:     albuterol sulfate  (90 Base) MCG/ACT inhaler, Inhale 2 puffs Every 4 (Four) Hours As Needed for Wheezing or Shortness of Air for up to 30 days., Disp: 18 g, Rfl: 11    atorvastatin (LIPITOR) 20 MG tablet, Take 1 tablet by mouth Daily., Disp: 30 tablet, Rfl: 5    budesonide-formoterol (SYMBICORT) 160-4.5 MCG/ACT inhaler, Inhale 2 puffs 2 (Two) Times a Day. Rinse mouth out after each use, Disp: 1 each, Rfl: 11    levocetirizine (Xyzal) 5 MG tablet, Take 1 tablet by mouth Every Evening., Disp: 30 tablet, Rfl: 5    tamsulosin (FLOMAX) 0.4 MG capsule 24 hr capsule, Take 2 capsules by mouth Daily., Disp: 180 capsule, Rfl: 3    fluticasone (Flonase) 50 MCG/ACT nasal spray, 2 sprays into the nostril(s) as directed by provider Daily. (Patient not taking: Reported on 9/26/2023), Disp: 16 g, Rfl: 5    GaviLyte-G 236 g solution, , Disp: , Rfl:     montelukast (SINGULAIR) 10 MG tablet, Take 1 tablet by mouth Every Night., Disp: 30 tablet, Rfl: 11     Objective     Physical Exam  Vital Signs:   WDWN, Alert, NAD.    HEENT:  PERRL, EOMI.  OP, nares clear, no sinus tenderness  Neck:  Supple, no JVD, no thyromegaly.  Lymph: no axillary, cervical, supraclavicular lymphadenopathy noted bilaterally  Chest: Mildly decreased breath sounds throughout. No wheezes, rales, or rhonchi appreciated.  Normal work of breathing noted.  Patient is able speak full  "sentences without difficulty.  CV: RRR, no MGR, pulses 2+, equal.  Abd:  Soft, NT, ND, + BS, no HSM  EXT:  no clubbing, no cyanosis, no edema, no joint tenderness  Neuro:  A&Ox3, CN grossly intact, no focal deficits.  Skin: No rashes or lesions noted.    BP 98/54 (BP Location: Right arm, Patient Position: Sitting, Cuff Size: Large Adult)   Pulse 66   Temp 98.9 °F (37.2 °C) (Tympanic)   Resp 18   Ht 180.3 cm (71\")   Wt 78.5 kg (173 lb)   SpO2 97% Comment: room air  BMI 24.13 kg/m²         Result Review :   I have reviewed Dr. Bryant's last office visit note.    Procedures:            Assessment and Plan      Assessment:  1.  History of chronic eosinophilic pneumonia, stable.      2.  History of lipoid pneumonia.      3. Chronic dyspnea.      4. Chronic wheezing.      5. Iron deficiency anemia/thrombocytopenia patient is under the care of MIKE Lyon.  6.  Lung nodule.  7.  Allergic rhinitis.  8.  Seasonal allergies.  9.  Never smoker.         Plan:  1.  Feno/exhaled nitric oxide testing performed in the office today with a level of 17 signifying no eosinophilic airway inflammation.    2. Patient reports that he is only taking Symbicort sporadically.  Patient is advised to take Symbicort 2 puffs twice daily as prescribed and rinse mouth out after each use.  Medication compliance discussed with patient in the office today.  Risks of not taking medications as prescribed discussed with the patient.  Patient verbalized understanding and compliance. Patient is advised to take all medications as prescribed.   3.  Continue albuterol inhaler as needed.  4.  Will restart patient on singular 10 mg at bedtime.  5.  Will restart patient on Xyzal 5 mg at bedtime.  6.  Will order a follow-up chest CT scan, CBC, CMP, and IgE level.  7.  Anemia and thrombocytopenia per hematology   8.  Vaccination status: patient reports they are up-to-date with pneumonia and Covid vaccines.  Flu vaccination is not available in " the office today.  Patient is advised to receive his flu vaccination through his primary care provider or through his pharmacy.  Patient is advised to continue to follow CDC recommendations such as social distancing wearing a mask and washing hands for at least 20 seconds.  9.  Smoking status: never smoker.  10.  Patient to call the office, 911, or go to the ER with new or worsening symptoms.  11.  Follow-up in 6 to 8 weeks, sooner if needed.              Follow Up   Return for 6-8 weeks.  Patient was given instructions and counseling regarding his condition or for health maintenance advice. Please see specific information pulled into the AVS if appropriate.

## 2023-09-26 ENCOUNTER — TELEPHONE (OUTPATIENT)
Dept: PULMONOLOGY | Facility: CLINIC | Age: 63
End: 2023-09-26

## 2023-09-26 ENCOUNTER — OFFICE VISIT (OUTPATIENT)
Dept: PULMONOLOGY | Facility: CLINIC | Age: 63
End: 2023-09-26
Payer: MEDICARE

## 2023-09-26 VITALS
HEART RATE: 66 BPM | WEIGHT: 173 LBS | OXYGEN SATURATION: 97 % | TEMPERATURE: 98.9 F | DIASTOLIC BLOOD PRESSURE: 54 MMHG | HEIGHT: 71 IN | SYSTOLIC BLOOD PRESSURE: 98 MMHG | BODY MASS INDEX: 24.22 KG/M2 | RESPIRATION RATE: 18 BRPM

## 2023-09-26 DIAGNOSIS — J69.1 LIPOID PNEUMONIA: ICD-10-CM

## 2023-09-26 DIAGNOSIS — J30.9 ALLERGIC RHINITIS, UNSPECIFIED SEASONALITY, UNSPECIFIED TRIGGER: Primary | ICD-10-CM

## 2023-09-26 DIAGNOSIS — J30.2 SEASONAL ALLERGIES: ICD-10-CM

## 2023-09-26 DIAGNOSIS — J82.81 CHRONIC EOSINOPHILIC PNEUMONIA: ICD-10-CM

## 2023-09-26 DIAGNOSIS — R05.3 CHRONIC COUGH: ICD-10-CM

## 2023-09-26 DIAGNOSIS — D69.6 THROMBOCYTOPENIA: ICD-10-CM

## 2023-09-26 DIAGNOSIS — J30.89 NON-SEASONAL ALLERGIC RHINITIS, UNSPECIFIED TRIGGER: ICD-10-CM

## 2023-09-26 DIAGNOSIS — R06.2 WHEEZING: ICD-10-CM

## 2023-09-26 DIAGNOSIS — R06.09 CHRONIC DYSPNEA: ICD-10-CM

## 2023-09-26 DIAGNOSIS — D50.9 IRON DEFICIENCY ANEMIA, UNSPECIFIED IRON DEFICIENCY ANEMIA TYPE: ICD-10-CM

## 2023-09-26 LAB — EXHALED NITROUS OXIDE: 17

## 2023-09-26 RX ORDER — ALBUTEROL SULFATE 90 UG/1
2 AEROSOL, METERED RESPIRATORY (INHALATION) EVERY 4 HOURS PRN
Qty: 18 G | Refills: 11 | Status: SHIPPED | OUTPATIENT
Start: 2023-09-26 | End: 2023-10-26

## 2023-09-26 RX ORDER — LEVOCETIRIZINE DIHYDROCHLORIDE 5 MG/1
5 TABLET, FILM COATED ORAL EVERY EVENING
Qty: 30 TABLET | Refills: 5 | Status: SHIPPED | OUTPATIENT
Start: 2023-09-26

## 2023-09-26 RX ORDER — MONTELUKAST SODIUM 10 MG/1
10 TABLET ORAL NIGHTLY
Qty: 30 TABLET | Refills: 11 | Status: SHIPPED | OUTPATIENT
Start: 2023-09-26

## 2023-09-26 RX ORDER — POLYETHYLENE GLYCOL-3350 AND ELECTROLYTES 236; 6.74; 5.86; 2.97; 22.74 G/274.31G; G/274.31G; G/274.31G; G/274.31G; G/274.31G
POWDER, FOR SOLUTION ORAL
COMMUNITY
Start: 2023-08-26

## 2023-09-26 RX ORDER — BUDESONIDE AND FORMOTEROL FUMARATE DIHYDRATE 160; 4.5 UG/1; UG/1
2 AEROSOL RESPIRATORY (INHALATION)
Qty: 1 EACH | Refills: 11 | Status: SHIPPED | OUTPATIENT
Start: 2023-09-26

## 2023-09-26 NOTE — TELEPHONE ENCOUNTER
Patient is cleared with moderate risk for colonoscopy with Dr. Carrera. Please fax over pulmonary clearance. Thanks.

## 2023-10-01 DIAGNOSIS — E78.2 MIXED HYPERLIPIDEMIA: ICD-10-CM

## 2023-10-02 RX ORDER — ATORVASTATIN CALCIUM 20 MG/1
TABLET, FILM COATED ORAL
Qty: 30 TABLET | Refills: 5 | OUTPATIENT
Start: 2023-10-02

## 2023-10-03 NOTE — PRE-PROCEDURE INSTRUCTIONS
"Instructed on date and arrival time of 0730. Come to entrance \"C\". Must have  over age 18 to drive home.  May have two visitors; however, children under 12 must stay in waiting room.  Discussed clear liquid diet (no red or purple) and bowel prep.  May take medications as usual except for blood thinners, diabetic medications, and weight loss medications.  Bring list of medications.  Verbalized understanding of instructions given.  Instructed to call for questions or concerns.  Pulmonary clearance noted in chart.  "

## 2023-10-06 ENCOUNTER — OFFICE VISIT (OUTPATIENT)
Dept: FAMILY MEDICINE CLINIC | Facility: CLINIC | Age: 63
End: 2023-10-06
Payer: MEDICARE

## 2023-10-06 VITALS
WEIGHT: 170.4 LBS | HEART RATE: 84 BPM | TEMPERATURE: 97.1 F | OXYGEN SATURATION: 98 % | SYSTOLIC BLOOD PRESSURE: 110 MMHG | HEIGHT: 71 IN | DIASTOLIC BLOOD PRESSURE: 60 MMHG | BODY MASS INDEX: 23.85 KG/M2

## 2023-10-06 DIAGNOSIS — R06.2 WHEEZING: ICD-10-CM

## 2023-10-06 DIAGNOSIS — R05.3 CHRONIC COUGH: ICD-10-CM

## 2023-10-06 DIAGNOSIS — J69.1 LIPOID PNEUMONIA: ICD-10-CM

## 2023-10-06 DIAGNOSIS — Z00.00 MEDICARE ANNUAL WELLNESS VISIT, INITIAL: Primary | ICD-10-CM

## 2023-10-06 DIAGNOSIS — D50.9 IRON DEFICIENCY ANEMIA, UNSPECIFIED IRON DEFICIENCY ANEMIA TYPE: ICD-10-CM

## 2023-10-06 DIAGNOSIS — J82.81 CHRONIC EOSINOPHILIC PNEUMONIA: ICD-10-CM

## 2023-10-06 DIAGNOSIS — D69.6 THROMBOCYTOPENIA: ICD-10-CM

## 2023-10-06 DIAGNOSIS — R06.09 CHRONIC DYSPNEA: ICD-10-CM

## 2023-10-06 DIAGNOSIS — J30.9 ALLERGIC RHINITIS, UNSPECIFIED SEASONALITY, UNSPECIFIED TRIGGER: ICD-10-CM

## 2023-10-06 DIAGNOSIS — E78.2 MIXED HYPERLIPIDEMIA: ICD-10-CM

## 2023-10-06 DIAGNOSIS — Z23 NEED FOR INFLUENZA VACCINATION: ICD-10-CM

## 2023-10-06 LAB
ALBUMIN SERPL-MCNC: 4.3 G/DL (ref 3.5–5.2)
ALBUMIN/GLOB SERPL: 1.7 G/DL
ALP SERPL-CCNC: 77 U/L (ref 39–117)
ALT SERPL W P-5'-P-CCNC: 19 U/L (ref 1–41)
ANION GAP SERPL CALCULATED.3IONS-SCNC: 11.5 MMOL/L (ref 5–15)
AST SERPL-CCNC: 19 U/L (ref 1–40)
BASOPHILS # BLD AUTO: 0.03 10*3/MM3 (ref 0–0.2)
BASOPHILS NFR BLD AUTO: 0.6 % (ref 0–1.5)
BILIRUB SERPL-MCNC: 0.6 MG/DL (ref 0–1.2)
BUN SERPL-MCNC: 12 MG/DL (ref 8–23)
BUN/CREAT SERPL: 14.3 (ref 7–25)
CALCIUM SPEC-SCNC: 9.3 MG/DL (ref 8.6–10.5)
CHLORIDE SERPL-SCNC: 104 MMOL/L (ref 98–107)
CHOLEST SERPL-MCNC: 124 MG/DL (ref 0–200)
CO2 SERPL-SCNC: 25.5 MMOL/L (ref 22–29)
CREAT SERPL-MCNC: 0.84 MG/DL (ref 0.76–1.27)
DEPRECATED RDW RBC AUTO: 43.9 FL (ref 37–54)
EGFRCR SERPLBLD CKD-EPI 2021: 96.8 ML/MIN/1.73
EOSINOPHIL # BLD AUTO: 0.12 10*3/MM3 (ref 0–0.4)
EOSINOPHIL NFR BLD AUTO: 2.2 % (ref 0.3–6.2)
ERYTHROCYTE [DISTWIDTH] IN BLOOD BY AUTOMATED COUNT: 13 % (ref 12.3–15.4)
GLOBULIN UR ELPH-MCNC: 2.6 GM/DL
GLUCOSE SERPL-MCNC: 98 MG/DL (ref 65–99)
HCT VFR BLD AUTO: 43.9 % (ref 37.5–51)
HDLC SERPL-MCNC: 38 MG/DL (ref 40–60)
HGB BLD-MCNC: 15.4 G/DL (ref 13–17.7)
LDLC SERPL CALC-MCNC: 69 MG/DL (ref 0–100)
LDLC/HDLC SERPL: 1.82 {RATIO}
LYMPHOCYTES # BLD AUTO: 2.46 10*3/MM3 (ref 0.7–3.1)
LYMPHOCYTES NFR BLD AUTO: 46 % (ref 19.6–45.3)
MCH RBC QN AUTO: 32.3 PG (ref 26.6–33)
MCHC RBC AUTO-ENTMCNC: 35.1 G/DL (ref 31.5–35.7)
MCV RBC AUTO: 92 FL (ref 79–97)
MONOCYTES # BLD AUTO: 0.33 10*3/MM3 (ref 0.1–0.9)
MONOCYTES NFR BLD AUTO: 6.2 % (ref 5–12)
NEUTROPHILS NFR BLD AUTO: 2.38 10*3/MM3 (ref 1.7–7)
NEUTROPHILS NFR BLD AUTO: 44.4 % (ref 42.7–76)
PLATELET # BLD AUTO: 109 10*3/MM3 (ref 140–450)
PMV BLD AUTO: 11.4 FL (ref 6–12)
POTASSIUM SERPL-SCNC: 4.2 MMOL/L (ref 3.5–5.2)
PROT SERPL-MCNC: 6.9 G/DL (ref 6–8.5)
RBC # BLD AUTO: 4.77 10*6/MM3 (ref 4.14–5.8)
SODIUM SERPL-SCNC: 141 MMOL/L (ref 136–145)
TRIGL SERPL-MCNC: 85 MG/DL (ref 0–150)
VLDLC SERPL-MCNC: 17 MG/DL (ref 5–40)
WBC NRBC COR # BLD: 5.35 10*3/MM3 (ref 3.4–10.8)

## 2023-10-06 PROCEDURE — 82785 ASSAY OF IGE: CPT | Performed by: NURSE PRACTITIONER

## 2023-10-06 PROCEDURE — 1159F MED LIST DOCD IN RCRD: CPT | Performed by: FAMILY MEDICINE

## 2023-10-06 PROCEDURE — 80061 LIPID PANEL: CPT | Performed by: FAMILY MEDICINE

## 2023-10-06 PROCEDURE — 1170F FXNL STATUS ASSESSED: CPT | Performed by: FAMILY MEDICINE

## 2023-10-06 PROCEDURE — 85025 COMPLETE CBC W/AUTO DIFF WBC: CPT | Performed by: NURSE PRACTITIONER

## 2023-10-06 PROCEDURE — 90662 IIV NO PRSV INCREASED AG IM: CPT | Performed by: FAMILY MEDICINE

## 2023-10-06 PROCEDURE — 1160F RVW MEDS BY RX/DR IN RCRD: CPT | Performed by: FAMILY MEDICINE

## 2023-10-06 PROCEDURE — 80053 COMPREHEN METABOLIC PANEL: CPT | Performed by: FAMILY MEDICINE

## 2023-10-06 PROCEDURE — G0008 ADMIN INFLUENZA VIRUS VAC: HCPCS | Performed by: FAMILY MEDICINE

## 2023-10-06 RX ORDER — ATORVASTATIN CALCIUM 20 MG/1
20 TABLET, FILM COATED ORAL DAILY
Qty: 30 TABLET | Refills: 5 | Status: SHIPPED | OUTPATIENT
Start: 2023-10-06

## 2023-10-06 NOTE — PROGRESS NOTES
The ABCs of the Annual Wellness Visit  Subsequent Medicare Wellness Visit    Subjective    Andry Harper is a 63 y.o. male who presents for a Subsequent Medicare Wellness Visit.    The following portions of the patient's history were reviewed and   updated as appropriate: He  has a past medical history of Anemia, AVM (arteriovenous malformation), Colon polyp, Hyperlipidemia, Hypertension, and Lung disease.  He does not have any pertinent problems on file.  He  has a past surgical history that includes Lung biopsy (Right); Colonoscopy (10/26/2018); Cyst Removal; and Colonoscopy (N/A, 8/14/2023).  His family history includes Cancer in his brother and father; Diabetes in his brother; Heart attack in his brother; Heart disease in his brother; Kidney disease in his brother; Lymphoma in his father; Stroke in his brother and mother.  He  reports that he has never smoked. He has never used smokeless tobacco. He reports that he does not currently use alcohol. He reports that he does not use drugs.  Current Outpatient Medications   Medication Sig Dispense Refill    albuterol sulfate  (90 Base) MCG/ACT inhaler Inhale 2 puffs Every 4 (Four) Hours As Needed for Wheezing or Shortness of Air for up to 30 days. 18 g 11    atorvastatin (LIPITOR) 20 MG tablet Take 1 tablet by mouth Daily. 30 tablet 5    budesonide-formoterol (SYMBICORT) 160-4.5 MCG/ACT inhaler Inhale 2 puffs 2 (Two) Times a Day. Rinse mouth out after each use 1 each 11    levocetirizine (Xyzal) 5 MG tablet Take 1 tablet by mouth Every Evening. 30 tablet 5    montelukast (SINGULAIR) 10 MG tablet Take 1 tablet by mouth Every Night. 30 tablet 11    tamsulosin (FLOMAX) 0.4 MG capsule 24 hr capsule Take 2 capsules by mouth Daily. 180 capsule 3     No current facility-administered medications for this visit.     Current Outpatient Medications on File Prior to Visit   Medication Sig    albuterol sulfate  (90 Base) MCG/ACT inhaler Inhale 2 puffs Every 4  (Four) Hours As Needed for Wheezing or Shortness of Air for up to 30 days.    budesonide-formoterol (SYMBICORT) 160-4.5 MCG/ACT inhaler Inhale 2 puffs 2 (Two) Times a Day. Rinse mouth out after each use    levocetirizine (Xyzal) 5 MG tablet Take 1 tablet by mouth Every Evening.    montelukast (SINGULAIR) 10 MG tablet Take 1 tablet by mouth Every Night.    tamsulosin (FLOMAX) 0.4 MG capsule 24 hr capsule Take 2 capsules by mouth Daily.    [DISCONTINUED] atorvastatin (LIPITOR) 20 MG tablet Take 1 tablet by mouth Daily.    [DISCONTINUED] fluticasone (Flonase) 50 MCG/ACT nasal spray 2 sprays into the nostril(s) as directed by provider Daily. (Patient not taking: Reported on 9/26/2023)    [DISCONTINUED] GaviLyte-G 236 g solution  (Patient not taking: Reported on 9/26/2023)     No current facility-administered medications on file prior to visit.     He is allergic to penicillins..    Compared to one year ago, the patient feels his physical   health is better.    Compared to one year ago, the patient feels his mental   health is better.    Recent Hospitalizations:  He was not admitted to the hospital during the last year.       Current Medical Providers:  Patient Care Team:  Keenan Craft MD as PCP - General (Family Medicine)  Dalia Rogers APRN as Nurse Practitioner (Nurse Practitioner)  Michelle Hightower MD as Consulting Physician (Urology)    Outpatient Medications Prior to Visit   Medication Sig Dispense Refill    albuterol sulfate  (90 Base) MCG/ACT inhaler Inhale 2 puffs Every 4 (Four) Hours As Needed for Wheezing or Shortness of Air for up to 30 days. 18 g 11    budesonide-formoterol (SYMBICORT) 160-4.5 MCG/ACT inhaler Inhale 2 puffs 2 (Two) Times a Day. Rinse mouth out after each use 1 each 11    levocetirizine (Xyzal) 5 MG tablet Take 1 tablet by mouth Every Evening. 30 tablet 5    montelukast (SINGULAIR) 10 MG tablet Take 1 tablet by mouth Every Night. 30 tablet 11    tamsulosin (FLOMAX)  "0.4 MG capsule 24 hr capsule Take 2 capsules by mouth Daily. 180 capsule 3    atorvastatin (LIPITOR) 20 MG tablet Take 1 tablet by mouth Daily. 30 tablet 5    fluticasone (Flonase) 50 MCG/ACT nasal spray 2 sprays into the nostril(s) as directed by provider Daily. (Patient not taking: Reported on 9/26/2023) 16 g 5    GaviLyte-G 236 g solution  (Patient not taking: Reported on 9/26/2023)       No facility-administered medications prior to visit.       No opioid medication identified on active medication list. I have reviewed chart for other potential  high risk medication/s and harmful drug interactions in the elderly.        Aspirin is not on active medication list.  Aspirin use is not indicated based on review of current medical condition/s. Risk of harm outweighs potential benefits.  .    Patient Active Problem List   Diagnosis    Chronic eosinophilic pneumonia    Lipoid pneumonia    Chronic dyspnea    Wheezing    Thrombocytopenia    Dyspnea    Cough    Intermittent chest pain    Iron deficiency anemia    Elevated prostate specific antigen (PSA)    Allergic rhinitis    Anemia    BPH without urinary obstruction    Hypercholesteremia    Hyperlipemia    Pneumonia    BPH with obstruction/lower urinary tract symptoms    History of colon polyps    Seasonal allergies     Advance Care Planning   Advance Care Planning     Advance Directive is not on file.  ACP discussion was held with the patient during this visit. Patient does not have an advance directive, information provided.     Objective    Vitals:    10/06/23 1259   BP: 110/60   BP Location: Left arm   Patient Position: Sitting   Cuff Size: Adult   Pulse: 84   Temp: 97.1 °F (36.2 °C)   SpO2: 98%   Weight: 77.3 kg (170 lb 6.4 oz)   Height: 180.3 cm (71\")     Estimated body mass index is 23.77 kg/m² as calculated from the following:    Height as of this encounter: 180.3 cm (71\").    Weight as of this encounter: 77.3 kg (170 lb 6.4 oz).    BMI is within normal " parameters. No other follow-up for BMI required.      Does the patient have evidence of cognitive impairment? No          HEALTH RISK ASSESSMENT    Smoking Status:  Social History     Tobacco Use   Smoking Status Never   Smokeless Tobacco Never     Alcohol Consumption:  Social History     Substance and Sexual Activity   Alcohol Use Not Currently     Fall Risk Screen:    TWILA Fall Risk Assessment was completed, and patient is at LOW risk for falls.Assessment completed on:10/6/2023    Depression Screening:      10/6/2023    12:00 PM   PHQ-2/PHQ-9 Depression Screening   Little Interest or Pleasure in Doing Things 0-->not at all   Feeling Down, Depressed or Hopeless 0-->not at all   PHQ-9: Brief Depression Severity Measure Score 0       Health Habits and Functional and Cognitive Screening:      10/6/2023    12:00 PM   Functional & Cognitive Status   Do you have difficulty preparing food and eating? No   Do you have difficulty bathing yourself, getting dressed or grooming yourself? No   Do you have difficulty using the toilet? No   Do you have difficulty moving around from place to place? No   Do you have trouble with steps or getting out of a bed or a chair? No   Current Diet Well Balanced Diet   Dental Exam Up to date   Eye Exam Not up to date   Exercise (times per week) 0 times per week   Current Exercises Include No Regular Exercise   Do you need help using the phone?  No   Are you deaf or do you have serious difficulty hearing?  No   Do you need help to go to places out of walking distance? No   Do you need help shopping? No   Do you need help preparing meals?  No   Do you need help with housework?  No   Do you need help with laundry? No   Do you need help taking your medications? No   Do you need help managing money? No   Do you ever drive or ride in a car without wearing a seat belt? No   Have you felt unusual stress, anger or loneliness in the last month? No   Who do you live with? Alone   If you need help, do  you have trouble finding someone available to you? No   Have you been bothered in the last four weeks by sexual problems? No   Do you have difficulty concentrating, remembering or making decisions? No       Age-appropriate Screening Schedule:  Refer to the list below for future screening recommendations based on patient's age, sex and/or medical conditions. Orders for these recommended tests are listed in the plan section. The patient has been provided with a written plan.    Health Maintenance   Topic Date Due    ZOSTER VACCINE (1 of 2) Never done    COVID-19 Vaccine (3 - 2023-24 season) 09/01/2023    Pneumococcal Vaccine 65+ (2 - PCV) 11/30/2023 (Originally 8/25/2022)    LIPID PANEL  01/28/2024    ANNUAL WELLNESS VISIT  10/06/2024    COLORECTAL CANCER SCREENING  08/14/2028    TDAP/TD VACCINES (2 - Td or Tdap) 04/10/2033    HEPATITIS C SCREENING  Completed    INFLUENZA VACCINE  Completed                  CMS Preventative Services Quick Reference  Risk Factors Identified During Encounter  Immunizations Discussed/Encouraged: Shingrix  The above risks/problems have been discussed with the patient.  Pertinent information has been shared with the patient in the After Visit Summary.  An After Visit Summary and PPPS were made available to the patient.    Follow Up:   Next Medicare Wellness visit to be scheduled in 1 year.       Additional E&M Note during same encounter follows:  Patient has multiple medical problems which are significant and separately identifiable that require additional work above and beyond the Medicare Wellness Visit.      Chief Complaint  Medicare Wellness-Initial Visit and Hyperlipidemia    Subjective        HPI      Review of Systems   Constitutional:  Negative for fatigue and fever.   HENT:  Negative for sore throat.    Eyes:  Negative for visual disturbance.   Respiratory:  Negative for cough, chest tightness, shortness of breath and wheezing.    Cardiovascular:  Negative for chest pain,  "palpitations and leg swelling.   Gastrointestinal:  Negative for abdominal pain, diarrhea, nausea and vomiting.   Neurological:  Negative for light-headedness.   Psychiatric/Behavioral:  Negative for decreased concentration and dysphoric mood.      Objective   Vital Signs:  /60 (BP Location: Left arm, Patient Position: Sitting, Cuff Size: Adult)   Pulse 84   Temp 97.1 °F (36.2 °C)   Ht 180.3 cm (71\")   Wt 77.3 kg (170 lb 6.4 oz)   SpO2 98%   BMI 23.77 kg/m²     Physical Exam  Vitals reviewed.   Constitutional:       Appearance: Normal appearance. He is well-developed.   HENT:      Head: Normocephalic and atraumatic.      Right Ear: External ear normal.      Left Ear: External ear normal.      Mouth/Throat:      Pharynx: No oropharyngeal exudate.   Eyes:      Conjunctiva/sclera: Conjunctivae normal.      Pupils: Pupils are equal, round, and reactive to light.   Cardiovascular:      Rate and Rhythm: Normal rate and regular rhythm.      Pulses: Normal pulses.      Heart sounds: Normal heart sounds. No murmur heard.    No friction rub. No gallop.   Pulmonary:      Effort: Pulmonary effort is normal.      Breath sounds: Normal breath sounds. No wheezing or rhonchi.   Abdominal:      General: Abdomen is flat. Bowel sounds are normal. There is no distension.      Palpations: Abdomen is soft. There is no mass.      Tenderness: There is no abdominal tenderness. There is no guarding or rebound.      Hernia: No hernia is present.   Musculoskeletal:         General: Normal range of motion.   Skin:     General: Skin is warm and dry.      Capillary Refill: Capillary refill takes less than 2 seconds.   Neurological:      General: No focal deficit present.      Mental Status: He is alert and oriented to person, place, and time.      Cranial Nerves: No cranial nerve deficit.   Psychiatric:         Mood and Affect: Mood and affect normal.         Behavior: Behavior normal.         Thought Content: Thought content normal.  "        Judgment: Judgment normal.        The following data was reviewed by: Keenan Craft MD on 10/06/2023:  CMP          1/28/2023    08:14 7/26/2023    18:40   CMP   Glucose 98     BUN 18     Creatinine 0.92  0.90    EGFR 92.9     Sodium 143     Potassium 4.4     Chloride 104     Calcium 9.2     Total Protein 6.6     Albumin 4.1     Globulin 2.5     Total Bilirubin 0.7     Alkaline Phosphatase 84     AST (SGOT) 17     ALT (SGPT) 19     Albumin/Globulin Ratio 1.6     BUN/Creatinine Ratio 19.6     Anion Gap 10.9       CBC          1/28/2023    08:14 2/8/2023    08:59 7/25/2023    09:12   CBC   WBC 5.41  5.30  5.08    RBC 4.61  4.63  4.56    Hemoglobin 14.3  14.3  14.4    Hematocrit 42.2  41.6  41.9    MCV 91.5  89.8  91.9    MCH 31.0  30.9  31.6    MCHC 33.9  34.4  34.4    RDW 13.4  13.1  13.3    Platelets 121  109  97      Lipid Panel          1/28/2023    08:14   Lipid Panel   Total Cholesterol 115    Triglycerides 50    HDL Cholesterol 41    VLDL Cholesterol 12    LDL Cholesterol  62    LDL/HDL Ratio 1.56        PSA          12/12/2022    11:49 2/8/2023    08:59 7/25/2023    09:12   PSA   PSA 9.740  8.870  7.000                 Assessment and Plan   Diagnoses and all orders for this visit:    1. Medicare annual wellness visit, initial (Primary)  -     CBC Auto Differential    2. Need for influenza vaccination  -     Fluzone High-Dose 65+yrs    3. Mixed hyperlipidemia  -     atorvastatin (LIPITOR) 20 MG tablet; Take 1 tablet by mouth Daily.  Dispense: 30 tablet; Refill: 5  -     Comprehensive Metabolic Panel  -     Lipid Panel             Follow Up   Return in about 6 months (around 4/6/2024) for Recheck.  Patient was given instructions and counseling regarding his condition or for health maintenance advice. Please see specific information pulled into the AVS if appropriate.

## 2023-10-06 NOTE — PROGRESS NOTES
Venipuncture Blood Specimen Collection  Venipuncture performed in left arm  by Peace Chanel with good hemostasis. Patient tolerated the procedure well without complications.   10/06/23   Peace Chanel

## 2023-10-11 LAB — IGE SERPL-ACNC: 6 IU/ML (ref 6–495)

## 2023-10-13 ENCOUNTER — ANESTHESIA EVENT (OUTPATIENT)
Dept: GASTROENTEROLOGY | Facility: HOSPITAL | Age: 63
End: 2023-10-13
Payer: MEDICARE

## 2023-10-16 ENCOUNTER — ANESTHESIA (OUTPATIENT)
Dept: GASTROENTEROLOGY | Facility: HOSPITAL | Age: 63
End: 2023-10-16
Payer: MEDICARE

## 2023-10-16 ENCOUNTER — HOSPITAL ENCOUNTER (OUTPATIENT)
Facility: HOSPITAL | Age: 63
Setting detail: HOSPITAL OUTPATIENT SURGERY
Discharge: HOME | End: 2023-10-16
Attending: INTERNAL MEDICINE | Admitting: INTERNAL MEDICINE
Payer: MEDICARE

## 2023-10-16 VITALS
HEART RATE: 61 BPM | BODY MASS INDEX: 23.61 KG/M2 | SYSTOLIC BLOOD PRESSURE: 100 MMHG | DIASTOLIC BLOOD PRESSURE: 62 MMHG | OXYGEN SATURATION: 98 % | WEIGHT: 168.65 LBS | TEMPERATURE: 97 F | RESPIRATION RATE: 14 BRPM | HEIGHT: 71 IN

## 2023-10-16 PROCEDURE — 25810000003 LACTATED RINGERS PER 1000 ML

## 2023-10-16 PROCEDURE — 25010000002 PROPOFOL 10 MG/ML EMULSION

## 2023-10-16 RX ORDER — SODIUM CHLORIDE, SODIUM LACTATE, POTASSIUM CHLORIDE, CALCIUM CHLORIDE 600; 310; 30; 20 MG/100ML; MG/100ML; MG/100ML; MG/100ML
30 INJECTION, SOLUTION INTRAVENOUS CONTINUOUS
Status: DISCONTINUED | OUTPATIENT
Start: 2023-10-16 | End: 2023-10-16 | Stop reason: HOSPADM

## 2023-10-16 RX ORDER — LIDOCAINE HYDROCHLORIDE 20 MG/ML
INJECTION, SOLUTION EPIDURAL; INFILTRATION; INTRACAUDAL; PERINEURAL AS NEEDED
Status: DISCONTINUED | OUTPATIENT
Start: 2023-10-16 | End: 2023-10-16 | Stop reason: SURG

## 2023-10-16 RX ADMIN — PROPOFOL 150 MCG/KG/MIN: 10 INJECTION, EMULSION INTRAVENOUS at 09:18

## 2023-10-16 RX ADMIN — LIDOCAINE HYDROCHLORIDE 100 MG: 20 INJECTION, SOLUTION EPIDURAL; INFILTRATION; INTRACAUDAL; PERINEURAL at 09:18

## 2023-10-16 RX ADMIN — SODIUM CHLORIDE, POTASSIUM CHLORIDE, SODIUM LACTATE AND CALCIUM CHLORIDE 30 ML/HR: 600; 310; 30; 20 INJECTION, SOLUTION INTRAVENOUS at 08:42

## 2023-10-16 NOTE — H&P
Pre Procedure History & Physical    Chief Complaint:   Colon cancer screening    Subjective     HPI:   Past history colon polyps    Past Medical History:   Past Medical History:   Diagnosis Date    Anemia     AVM (arteriovenous malformation)     cecal AVM    Colon polyp     Hyperlipidemia     Hypertension     Lung disease        Past Surgical History:  Past Surgical History:   Procedure Laterality Date    COLONOSCOPY  10/26/2018    5 YR RECALL - POLYPS    COLONOSCOPY N/A 8/14/2023    Procedure: COLONOSCOPY WITH POLYPECTOMY/SNARE;  Surgeon: Ion Carrera MD;  Location: [facility] ENDOSCOPY;  Service: Gastroenterology;  Laterality: N/A;  COLON POLYP  POOR BOWEL PREP    CYST REMOVAL      Neck    LUNG BIOPSY Right        Family History:  Family History   Problem Relation Age of Onset    Stroke Mother     Lymphoma Father     Cancer Father     Heart disease Brother     Heart attack Brother     Stroke Brother     Diabetes Brother     Cancer Brother     Kidney disease Brother     Colon cancer Neg Hx        Social History:   reports that he has never smoked. He has never used smokeless tobacco. He reports that he does not currently use alcohol. He reports that he does not use drugs.    Medications:   Medications Prior to Admission   Medication Sig Dispense Refill Last Dose    albuterol sulfate  (90 Base) MCG/ACT inhaler Inhale 2 puffs Every 4 (Four) Hours As Needed for Wheezing or Shortness of Air for up to 30 days. 18 g 11     atorvastatin (LIPITOR) 20 MG tablet Take 1 tablet by mouth Daily. 30 tablet 5     budesonide-formoterol (SYMBICORT) 160-4.5 MCG/ACT inhaler Inhale 2 puffs 2 (Two) Times a Day. Rinse mouth out after each use 1 each 11     levocetirizine (Xyzal) 5 MG tablet Take 1 tablet by mouth Every Evening. 30 tablet 5     montelukast (SINGULAIR) 10 MG tablet Take 1 tablet by mouth Every Night. 30 tablet 11     tamsulosin (FLOMAX) 0.4 MG capsule 24 hr capsule Take 2 capsules by mouth Daily. 180 capsule 3   "      Allergies:  Penicillins        Objective     Blood pressure 123/77, pulse 62, temperature 97.9 °F (36.6 °C), temperature source Temporal, resp. rate 18, height 180.3 cm (71\"), weight 76.5 kg (168 lb 10.4 oz), SpO2 98%.    Physical Exam   Constitutional: Pt is oriented to person, place, and time and well-developed, well-nourished, and in no distress.   Mouth/Throat: Oropharynx is clear and moist.   Neck: Normal range of motion.   Cardiovascular: Normal rate, regular rhythm and normal heart sounds.    Pulmonary/Chest: Effort normal and breath sounds normal.   Abdominal: Soft. Nontender  Skin: Skin is warm and dry.   Psychiatric: Mood, memory, affect and judgment normal.     Assessment & Plan     Diagnosis:  Surveillance  Anticipated Surgical Procedure:  Colonoscopy    The risks, benefits, and alternatives of this procedure have been discussed with the patient or the responsible party- the patient understands and agrees to proceed.            "

## 2023-10-16 NOTE — ANESTHESIA POSTPROCEDURE EVALUATION
Patient: Andry Harper    Procedure Summary       Date: 10/16/23 Room / Location: [facility] ENDOSCOPY 4 / [facility] ENDOSCOPY    Anesthesia Start: 0915 Anesthesia Stop: 0949    Procedure: COLONOSCOPY Diagnosis:       Encounter for colonoscopy following colon polyp removal      (Encounter for colonoscopy following colon polyp removal [Z09, Z86.010])    Surgeons: Ion Carrera MD Provider: Camille Lynch CRNA    Anesthesia Type: general ASA Status: 2            Anesthesia Type: general    Vitals  Vitals Value Taken Time   BP 93/60 10/16/23 0952   Temp 36.2 °C (97.2 °F) 10/16/23 0947   Pulse 63 10/16/23 0954   Resp 15 10/16/23 0952   SpO2 96 % 10/16/23 0954   Vitals shown include unfiled device data.        Post Anesthesia Care and Evaluation    Patient location during evaluation: bedside  Level of consciousness: lethargic    Airway patency: patent  Anesthetic complications: No anesthetic complications  PONV Status: none  Cardiovascular status: acceptable  Respiratory status: acceptable

## 2023-10-20 ENCOUNTER — TELEPHONE (OUTPATIENT)
Dept: GASTROENTEROLOGY | Facility: CLINIC | Age: 63
End: 2023-10-20
Payer: MEDICARE

## 2023-10-20 NOTE — TELEPHONE ENCOUNTER
I have reviewed the patients colonoscopy report. The report showed a normal colonoscopy.  No polyps removed or specimens collected.  Repeat colonoscopy in 5 years due to personal history of colon polyps. Please place in recall. Send letter.

## 2023-10-25 ENCOUNTER — HOSPITAL ENCOUNTER (OUTPATIENT)
Dept: CT IMAGING | Facility: HOSPITAL | Age: 63
Discharge: HOME | End: 2023-10-25
Admitting: NURSE PRACTITIONER
Payer: MEDICARE

## 2023-10-25 DIAGNOSIS — J69.1 LIPOID PNEUMONIA: ICD-10-CM

## 2023-10-25 DIAGNOSIS — R05.3 CHRONIC COUGH: ICD-10-CM

## 2023-10-25 DIAGNOSIS — J21.9 BRONCHIOLITIS: ICD-10-CM

## 2023-10-25 DIAGNOSIS — D69.6 THROMBOCYTOPENIA: ICD-10-CM

## 2023-10-25 DIAGNOSIS — J30.9 ALLERGIC RHINITIS, UNSPECIFIED SEASONALITY, UNSPECIFIED TRIGGER: ICD-10-CM

## 2023-10-25 DIAGNOSIS — J82.81 CHRONIC EOSINOPHILIC PNEUMONIA: ICD-10-CM

## 2023-10-25 DIAGNOSIS — R93.89 ABNORMAL CHEST CT: Primary | ICD-10-CM

## 2023-10-25 DIAGNOSIS — D50.9 IRON DEFICIENCY ANEMIA, UNSPECIFIED IRON DEFICIENCY ANEMIA TYPE: ICD-10-CM

## 2023-10-25 DIAGNOSIS — R06.2 WHEEZING: ICD-10-CM

## 2023-10-25 DIAGNOSIS — R06.09 CHRONIC DYSPNEA: ICD-10-CM

## 2023-10-25 PROCEDURE — 71250 CT THORAX DX C-: CPT

## 2023-11-05 NOTE — PROGRESS NOTES
Primary Care Provider  Keenan Craft MD     Referring Provider  No ref. provider found     Chief Complaint  Cough, Shortness of Breath, Wheezing, and Follow-up (6-8 week follow up. )    Subjective          History of Presenting Illness  Patient is a 63-year-old male, patient of Dr. Graham who presents for management of chronic dyspnea and has a history of chronic eosinophilic pneumonia and lipoid pneumonia who presents for a follow-up visit today.  Patient states that he does get short of breath that is worse with exertion and at times when laying down at night, mild to moderate in severity, and improved with rest.  A chest CT scan and labs were ordered for further evaluation. Patient had labs completed on 10/6/2023.  CBC did not show any peripheral eosinophilia.  IgE level came back at 6.  CMP did not show any hypercapnia. Patient had a chest CT scan completed on 10/25/2023.  Report states there stable changes in the lung apices that are likely postinflammatory.  There are new bronchiolitis changes in the basilar lower lobes.  Patient states that he does take Symbicort, however admits to not taking it twice a day as prescribed.  Patient states that he typically only takes it once a day.  Patient states that he does have an albuterol inhaler to use as needed.  Patient states that he does not have any issues with coughing up any secretions.  Patient states that he does have a runny nose, nasal congestion, and postnasal drip.  Patient states that he is taking Xyzal for seasonal allergies, however admits that he is not taking Singulair.  Patient denies fever, chills, night sweats, swollen glands in the head and neck, unintentional weight loss, hemoptysis, purulent sputum production, dysphagia, chest pain, palpitations, chest tightness, abdominal pain, nausea, vomiting, and diarrhea.  Patient also denies any myalgias, changes in sense of taste and/or smell, sore throat, any other coronavirus or flu-like  symptoms.  Patient denies any leg swelling, orthopnea, paroxysmal nocturnal dyspnea.  Patient is able to perform activities of daily living.        Review of Systems   Constitutional:  Negative for activity change, appetite change, chills, diaphoresis, fatigue, fever, unexpected weight gain and unexpected weight loss.        Negative for Insomnia   HENT:  Positive for congestion (Nasal), postnasal drip and rhinorrhea. Negative for mouth sores, nosebleeds, sore throat, swollen glands and trouble swallowing.         Negative for Thrush  Negative for Hoarseness  Negative for Allergies/Hay Fever  Negative for Recent Head injury  Negative for Ear Fullness  Negative for Nasal or Sinus pain  Negative for Dry lips  Negative for Nasal discharge   Respiratory:  Positive for cough (intermittent) and shortness of breath (at baseline). Negative for apnea, chest tightness and wheezing.         Negative for Hemoptysis  Negative for Pleuritic pain   Cardiovascular:  Negative for chest pain, palpitations and leg swelling.        Negative for Claudication  Negative for Cyanosis  Negative for Dyspnea on exertion   Gastrointestinal:  Negative for abdominal pain, diarrhea, nausea, vomiting and GERD.   Musculoskeletal:  Negative for joint swelling and myalgias.        Negative for Joint pain  Negative for Joint stiffness   Skin:  Negative for color change, dry skin, pallor and rash.   Neurological:  Negative for syncope, weakness and headache.   Hematological:  Negative for adenopathy. Does not bruise/bleed easily.        Family History   Problem Relation Age of Onset    Stroke Mother     Lymphoma Father     Cancer Father     Heart disease Brother     Heart attack Brother     Stroke Brother     Diabetes Brother     Cancer Brother     Kidney disease Brother     Colon cancer Neg Hx         Social History     Socioeconomic History    Marital status: Single   Tobacco Use    Smoking status: Never    Smokeless tobacco: Never   Vaping Use     Vaping Use: Never used   Substance and Sexual Activity    Alcohol use: Not Currently    Drug use: Never    Sexual activity: Defer        Past Medical History:   Diagnosis Date    Anemia     AVM (arteriovenous malformation)     cecal AVM    Colon polyp     History of transfusion     Hyperlipidemia     Hypertension     Lung disease         Immunization History   Administered Date(s) Administered    COVID-19 (PFIZER) Purple Cap Monovalent 04/05/2021, 04/26/2021    Flu Vaccine Intradermal Quad 18-64YR 10/07/2020    Flu Vaccine Quad PF >36MO 10/01/2018, 09/30/2019    Fluzone (or Fluarix & Flulaval for VFC) >6mos 10/21/2022    Fluzone High-Dose 65+yrs 10/06/2023    Influenza, Unspecified 11/09/2021    Pneumococcal Polysaccharide (PPSV23) 08/25/2021    Tdap 04/10/2023       Allergies   Allergen Reactions    Penicillins Unknown - Low Severity          Current Outpatient Medications:     albuterol sulfate  (90 Base) MCG/ACT inhaler, Inhale 2 puffs Every 4 (Four) Hours As Needed for Wheezing., Disp: , Rfl:     atorvastatin (LIPITOR) 20 MG tablet, Take 1 tablet by mouth Daily., Disp: 30 tablet, Rfl: 5    budesonide-formoterol (SYMBICORT) 160-4.5 MCG/ACT inhaler, Inhale 2 puffs 2 (Two) Times a Day. Rinse mouth out after each use, Disp: 1 each, Rfl: 11    levocetirizine (Xyzal) 5 MG tablet, Take 1 tablet by mouth Every Evening., Disp: 30 tablet, Rfl: 5    montelukast (SINGULAIR) 10 MG tablet, Take 1 tablet by mouth Every Night., Disp: 30 tablet, Rfl: 11    tamsulosin (FLOMAX) 0.4 MG capsule 24 hr capsule, Take 2 capsules by mouth Daily., Disp: 180 capsule, Rfl: 3     Objective     Physical Exam  Vital Signs:   WDWN, Alert, NAD.    HEENT:  PERRL, EOMI.  OP, nares clear, no sinus tenderness  Neck:  Supple, no JVD, no thyromegaly.  Lymph: no axillary, cervical, supraclavicular lymphadenopathy noted bilaterally  Chest:  good aeration, clear to auscultation bilaterally, tympanic to percussion bilaterally, no work of breathing  "noted  CV: RRR, no MGR, pulses 2+, equal.  Abd:  Soft, NT, ND, + BS, no HSM  EXT:  no clubbing, no cyanosis, no edema, no joint tenderness  Neuro:  A&Ox3, CN grossly intact, no focal deficits.  Skin: No rashes or lesions noted.    /52 (BP Location: Left arm, Patient Position: Sitting, Cuff Size: Large Adult)   Pulse 72   Resp 16   Ht 180.3 cm (70.98\")   Wt 79.3 kg (174 lb 14.4 oz)   SpO2 99% Comment: room air  BMI 24.40 kg/m²         Result Review :   I have reviewed my last office visit note. I also reviewed lab results dated from 10/6/2023 and chest CT report dated from 10/25/2023. See scanned reports.     Procedures:      CT Chest Without Contrast Diagnostic    Result Date: 10/25/2023   There stable changes in the lung apices that are likely postinflammatory.  There are new bronchiolitis changes in the basilar lower lobes     MARIA ELENA BARROW MD       Electronically Signed and Approved By: MARIA ELENA BARROW MD on 10/25/2023 at 14:33                 Assessment and Plan      Assessment:  1.  History of chronic eosinophilic pneumonia, stable.      2.  History of lipoid pneumonia.      3. Chronic dyspnea.      4. Chronic wheezing.      5. Iron deficiency anemia/thrombocytopenia patient is under the care of MIKE Lyon.  6.  Lung nodule.  7.  Allergic rhinitis.  8.  Seasonal allergies.  9.  Abnormal chest CT scan: bronchiolitis changes present on chest CT scan dated from 10/25/2023.  10.  Never smoker.        Plan:  1.  Will repeat chest CT scan again in 6 months. Order placed prior to office visit today.  2. Patient again reports that he is only taking Symbicort sporadically.  Patient is advised to take Symbicort 2 puffs twice daily as prescribed and rinse mouth out after each use.  Medication compliance discussed with patient in the office today.  Risks of not taking medications as prescribed discussed with the patient.  Patient verbalized understanding and compliance. Patient is advised to take " all medications as prescribed.   3.  Continue albuterol inhaler as needed.  4.  Patient reports that he stopped taking Singulair.  Will restart singular 10 mg at night.  5.  Continue Xyzal 5 mg at bedtime.  6.  Chest CT scan showing some bronchiolitis changes.  Did offer further work-up.  Did offer to start patient on nebulizer treatments and did offer to prescribe patient a flutter valve to assist with airway clearance, however patient declines and states that he is not having any issues coughing up secretions and declines additional medications and work-up at this time.  7.  Will order an updated pulmonary function test and a 6-minute walk test.  8.  Anemia and thrombocytopenia per hematology   9.  Vaccination status: patient reports they are up-to-date with pneumonia and Covid vaccines.  Flu vaccination is not available in the office today.  Patient is advised to receive his flu vaccination through his primary care provider or through his pharmacy.  Patient is advised to continue to follow CDC recommendations such as social distancing wearing a mask and washing hands for at least 20 seconds.  10.  Smoking status: never smoker.  11.  Patient to call the office, 911, or go to the ER with new or worsening symptoms.  12.  Patient prefers to follow-up in 6 months after chest CT scan.  Recommended a sooner follow-up, however patient declines and states that he will contact our office if he needs to be seen sooner.  Follow-up in 6 months, sooner if needed.             Follow Up   Return in about 6 months (around 5/14/2024) for with Dr. Bryant.  Patient was given instructions and counseling regarding his condition or for health maintenance advice. Please see specific information pulled into the AVS if appropriate.

## 2023-11-14 ENCOUNTER — OFFICE VISIT (OUTPATIENT)
Dept: PULMONOLOGY | Facility: CLINIC | Age: 63
End: 2023-11-14
Payer: MEDICARE

## 2023-11-14 VITALS
SYSTOLIC BLOOD PRESSURE: 105 MMHG | DIASTOLIC BLOOD PRESSURE: 52 MMHG | BODY MASS INDEX: 24.48 KG/M2 | HEART RATE: 72 BPM | HEIGHT: 71 IN | OXYGEN SATURATION: 99 % | WEIGHT: 174.9 LBS | RESPIRATION RATE: 16 BRPM

## 2023-11-14 DIAGNOSIS — J82.81 CHRONIC EOSINOPHILIC PNEUMONIA: ICD-10-CM

## 2023-11-14 DIAGNOSIS — R06.09 CHRONIC DYSPNEA: ICD-10-CM

## 2023-11-14 DIAGNOSIS — J21.9 BRONCHIOLITIS: ICD-10-CM

## 2023-11-14 DIAGNOSIS — D69.6 THROMBOCYTOPENIA: ICD-10-CM

## 2023-11-14 DIAGNOSIS — R06.2 WHEEZING: ICD-10-CM

## 2023-11-14 DIAGNOSIS — R93.89 ABNORMAL CT SCAN, CHEST: ICD-10-CM

## 2023-11-14 DIAGNOSIS — D50.9 IRON DEFICIENCY ANEMIA, UNSPECIFIED IRON DEFICIENCY ANEMIA TYPE: ICD-10-CM

## 2023-11-14 DIAGNOSIS — J30.9 ALLERGIC RHINITIS, UNSPECIFIED SEASONALITY, UNSPECIFIED TRIGGER: Primary | ICD-10-CM

## 2023-11-14 DIAGNOSIS — J30.2 SEASONAL ALLERGIES: ICD-10-CM

## 2023-11-14 DIAGNOSIS — J69.1 LIPOID PNEUMONIA: ICD-10-CM

## 2023-11-14 PROCEDURE — 1160F RVW MEDS BY RX/DR IN RCRD: CPT | Performed by: NURSE PRACTITIONER

## 2023-11-14 PROCEDURE — 1159F MED LIST DOCD IN RCRD: CPT | Performed by: NURSE PRACTITIONER

## 2023-11-14 PROCEDURE — 99214 OFFICE O/P EST MOD 30 MIN: CPT | Performed by: NURSE PRACTITIONER

## 2023-11-14 RX ORDER — MONTELUKAST SODIUM 10 MG/1
10 TABLET ORAL NIGHTLY
Qty: 30 TABLET | Refills: 11 | Status: SHIPPED | OUTPATIENT
Start: 2023-11-14

## 2023-11-14 RX ORDER — ALBUTEROL SULFATE 90 UG/1
2 AEROSOL, METERED RESPIRATORY (INHALATION) EVERY 4 HOURS PRN
COMMUNITY

## 2023-12-05 ENCOUNTER — HOSPITAL ENCOUNTER (OUTPATIENT)
Dept: RESPIRATORY THERAPY | Facility: HOSPITAL | Age: 63
Discharge: HOME | End: 2023-12-05
Payer: MEDICARE

## 2023-12-05 DIAGNOSIS — R06.09 CHRONIC DYSPNEA: ICD-10-CM

## 2023-12-05 DIAGNOSIS — J30.2 SEASONAL ALLERGIES: ICD-10-CM

## 2023-12-05 DIAGNOSIS — D69.6 THROMBOCYTOPENIA: ICD-10-CM

## 2023-12-05 DIAGNOSIS — R06.2 WHEEZING: ICD-10-CM

## 2023-12-05 DIAGNOSIS — J30.9 ALLERGIC RHINITIS, UNSPECIFIED SEASONALITY, UNSPECIFIED TRIGGER: ICD-10-CM

## 2023-12-05 DIAGNOSIS — J82.81 CHRONIC EOSINOPHILIC PNEUMONIA: ICD-10-CM

## 2023-12-05 DIAGNOSIS — R93.89 ABNORMAL CT SCAN, CHEST: ICD-10-CM

## 2023-12-05 DIAGNOSIS — D50.9 IRON DEFICIENCY ANEMIA, UNSPECIFIED IRON DEFICIENCY ANEMIA TYPE: ICD-10-CM

## 2023-12-05 DIAGNOSIS — J69.1 LIPOID PNEUMONIA: ICD-10-CM

## 2023-12-05 PROCEDURE — 94060 EVALUATION OF WHEEZING: CPT

## 2023-12-05 PROCEDURE — 94729 DIFFUSING CAPACITY: CPT

## 2023-12-05 PROCEDURE — 94726 PLETHYSMOGRAPHY LUNG VOLUMES: CPT

## 2023-12-05 PROCEDURE — 94618 PULMONARY STRESS TESTING: CPT

## 2023-12-05 RX ORDER — ALBUTEROL SULFATE 2.5 MG/3ML
2.5 SOLUTION RESPIRATORY (INHALATION) ONCE
Status: COMPLETED | OUTPATIENT
Start: 2023-12-05 | End: 2023-12-05

## 2023-12-05 RX ADMIN — ALBUTEROL SULFATE 2.5 MG: 2.5 SOLUTION RESPIRATORY (INHALATION) at 09:35

## 2023-12-05 NOTE — PROGRESS NOTES
Exercise Oximetry    Patient Name:Andry Harper   MRN: [id number]   Date: 12/05/23             ROOM AIR BASELINE   SpO2%  98   Heart Rate   68   Blood Pressure   109/50     EXERCISE ON ROOM AIR SpO2% EXERCISE ON O2 @  LPM SpO2%   1 MINUTE 98 1 MINUTE N/A   2 MINUTES 97 2 MINUTES N/A   3 MINUTES 98 3 MINUTES N/A   4 MINUTES 98 4 MINUTES N/A   5 MINUTES 98 5 MINUTES N/A   6 MINUTES 98 6 MINUTES N/A              Distance Walked  1,680 feet Distance Walked  N/A   Dyspnea (Bobbi Scale)  2 Dyspnea (Bobbi Scale) N/A   Fatigue (Bobbi Scale)  0 Fatigue (Bobbi Scale) N/A   SpO2% Post Exercise  99 SpO2% Post Exercise N/A   HR Post Exercise  65 HR Post Exercise N/A   Time to Recovery  1- 2 minutes Time to Recovery N/A     Comments: Patient complained of mild shortness of air. Patient walked at a fast pace and did not need to stop and rest during the 6 minute walk.   Patient reached a HR of 105 during 6 minute walk. Post walk /52.

## 2024-01-05 ENCOUNTER — OFFICE VISIT (OUTPATIENT)
Dept: FAMILY MEDICINE CLINIC | Facility: CLINIC | Age: 64
End: 2024-01-05
Payer: MEDICARE

## 2024-01-05 VITALS
HEART RATE: 98 BPM | OXYGEN SATURATION: 99 % | DIASTOLIC BLOOD PRESSURE: 69 MMHG | SYSTOLIC BLOOD PRESSURE: 106 MMHG | TEMPERATURE: 98 F

## 2024-01-05 DIAGNOSIS — D69.6 THROMBOCYTOPENIA: ICD-10-CM

## 2024-01-05 DIAGNOSIS — S81.812A LACERATION OF LEFT LOWER EXTREMITY, INITIAL ENCOUNTER: Primary | ICD-10-CM

## 2024-01-05 LAB
BASOPHILS # BLD AUTO: 0.02 10*3/MM3 (ref 0–0.2)
BASOPHILS NFR BLD AUTO: 0.4 % (ref 0–1.5)
DEPRECATED RDW RBC AUTO: 41.1 FL (ref 37–54)
EOSINOPHIL # BLD AUTO: 0.09 10*3/MM3 (ref 0–0.4)
EOSINOPHIL NFR BLD AUTO: 1.8 % (ref 0.3–6.2)
ERYTHROCYTE [DISTWIDTH] IN BLOOD BY AUTOMATED COUNT: 12.6 % (ref 12.3–15.4)
HCT VFR BLD AUTO: 42.6 % (ref 37.5–51)
HGB BLD-MCNC: 14.2 G/DL (ref 13–17.7)
IMM GRANULOCYTES # BLD AUTO: 0.04 10*3/MM3 (ref 0–0.05)
IMM GRANULOCYTES NFR BLD AUTO: 0.8 % (ref 0–0.5)
LYMPHOCYTES # BLD AUTO: 1.45 10*3/MM3 (ref 0.7–3.1)
LYMPHOCYTES NFR BLD AUTO: 29.6 % (ref 19.6–45.3)
MCH RBC QN AUTO: 30.1 PG (ref 26.6–33)
MCHC RBC AUTO-ENTMCNC: 33.3 G/DL (ref 31.5–35.7)
MCV RBC AUTO: 90.4 FL (ref 79–97)
MONOCYTES # BLD AUTO: 0.37 10*3/MM3 (ref 0.1–0.9)
MONOCYTES NFR BLD AUTO: 7.6 % (ref 5–12)
NEUTROPHILS NFR BLD AUTO: 2.93 10*3/MM3 (ref 1.7–7)
NEUTROPHILS NFR BLD AUTO: 59.8 % (ref 42.7–76)
NRBC BLD AUTO-RTO: 0 /100 WBC (ref 0–0.2)
PLATELET # BLD AUTO: 118 10*3/MM3 (ref 140–450)
PMV BLD AUTO: 10.8 FL (ref 6–12)
RBC # BLD AUTO: 4.71 10*6/MM3 (ref 4.14–5.8)
WBC NRBC COR # BLD AUTO: 4.9 10*3/MM3 (ref 3.4–10.8)

## 2024-01-05 PROCEDURE — 85025 COMPLETE CBC W/AUTO DIFF WBC: CPT | Performed by: FAMILY MEDICINE

## 2024-01-05 NOTE — PROGRESS NOTES
Venipuncture Blood Specimen Collection  Venipuncture performed in left arm by rIa Harper with good hemostasis. Patient tolerated the procedure well without complications.   01/05/24   Ira Harper

## 2024-01-05 NOTE — PROGRESS NOTES
Chief Complaint  Laceration (Cut lower left leg with glass )    Subjective      History of Present Illness  Andry Harper is a 63 y.o. male who presents to [facility] with a past medical history of  Past Medical History:   Diagnosis Date    Anemia     AVM (arteriovenous malformation)     cecal AVM    Colon polyp     History of transfusion     Hyperlipidemia     Hypertension     Lung disease      Laceration.  He cut his left lower leg with glass.  Last Tdap given on 4/10/2023. It is a small cut, no more than a centimeter in his left lower leg. He dropped a piece of glass which shattered and then cut his leg.  The wound bled pretty profusely and soaked his shoes with blood before hemostasis was achieved.  It is a very small wound for such a large amount of blood.  He has noted to have some thrombocytopenia on labs with most recent platelets of 109 on 10/6/2023.  He has an upcoming appointment with hematology in February -he has been following them for the past several years for thrombocytopenia.    Nurse irrigated wound with saline.     Objective   Vital Signs:   Vitals:    01/05/24 0833   BP: 106/69   Pulse: 98   Temp: 98 °F (36.7 °C)   SpO2: 99%     There is no height or weight on file to calculate BMI.    Wt Readings from Last 3 Encounters:   11/14/23 79.3 kg (174 lb 14.4 oz)   10/16/23 76.5 kg (168 lb 10.4 oz)   10/06/23 77.3 kg (170 lb 6.4 oz)     BP Readings from Last 3 Encounters:   01/05/24 106/69   11/14/23 105/52   10/16/23 100/62       Health Maintenance   Topic Date Due    ZOSTER VACCINE (1 of 2) Never done    Pneumococcal Vaccine 65+ (2 of 2 - PCV) 08/25/2022    COVID-19 Vaccine (3 - 2023-24 season) 02/03/2024 (Originally 9/1/2023)    ANNUAL WELLNESS VISIT  10/06/2024    LIPID PANEL  10/06/2024    COLORECTAL CANCER SCREENING  10/16/2028    TDAP/TD VACCINES (2 - Td or Tdap) 04/10/2033    HEPATITIS C SCREENING  Completed    INFLUENZA VACCINE  Completed       Physical  Exam  Vitals and nursing note reviewed.   Constitutional:       General: He is not in acute distress.  Cardiovascular:      Rate and Rhythm: Normal rate and regular rhythm.   Pulmonary:      Effort: Pulmonary effort is normal. No respiratory distress.   Skin:     Comments: Roughly 7 to 8 mm laceration on his left medial lower leg.  Wound is well-closed with no erythema or swelling.  There is a lot of dried blood on the legs and feet.  He has a few small scrapes on his right lower leg as well.  But these are also nonbleeding.   Neurological:      Mental Status: He is alert.   Psychiatric:         Mood and Affect: Mood normal.         Behavior: Behavior normal.            Result Review :  The following data was reviewed by: Cristobal Iverson MD on 01/05/2024:      Laceration Repair    Date/Time: 1/5/2024 8:55 AM    Performed by: Cristobal Iverson MD  Authorized by: Cristobal Iverson MD  Body area: lower extremity  Laceration length: 0.8 cm  Foreign bodies: no foreign bodies  Tendon involvement: none  Nerve involvement: none  Vascular damage: no  Irrigation solution: saline  Amount of cleaning: standard  Debridement: none  Degree of undermining: none  Skin closure: Steri-Strips and glue  Patient tolerance: patient tolerated the procedure well with no immediate complications              Assessment and Plan   Diagnoses and all orders for this visit:    1. Laceration of left lower extremity, initial encounter (Primary)  -     Laceration Repair    2. Thrombocytopenia  -     CBC & Differential    Nurse irrigated and cleaned wound and then I glued the wound shot and placed Steri-Strips on it.  I advised patient that Steri-Strips will fall off on their own naturally within a week or 2.  He can wash the affected area but do not soak it in water.  Let us know if wound reopens or any redness or swelling or other concerns.  Given his thrombocytopenia and seemingly excessive bleeding for such a small wound, I am ordering  a CBC.    BMI is within normal parameters. No other follow-up for BMI required.         FOLLOW UP  Return if symptoms worsen or fail to improve.  Patient was given instructions and counseling regarding his condition or for health maintenance advice. Please see specific information pulled into the AVS if appropriate.       Cristobal Iverson MD  01/05/24  09:25 EST    CURRENT & DISCONTINUED MEDICATIONS  Current Outpatient Medications   Medication Instructions    albuterol sulfate  (90 Base) MCG/ACT inhaler 2 puffs, Inhalation, Every 4 Hours PRN    atorvastatin (LIPITOR) 20 mg, Oral, Daily    budesonide-formoterol (SYMBICORT) 160-4.5 MCG/ACT inhaler 2 puffs, Inhalation, 2 Times Daily - RT, Rinse mouth out after each use    levocetirizine (XYZAL) 5 mg, Oral, Every Evening    montelukast (SINGULAIR) 10 mg, Oral, Nightly    tamsulosin (FLOMAX) 0.8 mg, Oral, Daily       There are no discontinued medications.

## 2024-01-11 ENCOUNTER — LAB (OUTPATIENT)
Dept: LAB | Facility: HOSPITAL | Age: 64
End: 2024-01-11
Payer: MEDICARE

## 2024-01-11 DIAGNOSIS — R97.20 ELEVATED PROSTATE SPECIFIC ANTIGEN (PSA): ICD-10-CM

## 2024-01-11 LAB — PSA SERPL-MCNC: 9.62 NG/ML (ref 0–4)

## 2024-01-11 PROCEDURE — 36415 COLL VENOUS BLD VENIPUNCTURE: CPT

## 2024-01-11 PROCEDURE — 84153 ASSAY OF PSA TOTAL: CPT

## 2024-01-12 ENCOUNTER — TELEPHONE (OUTPATIENT)
Dept: PULMONOLOGY | Facility: CLINIC | Age: 64
End: 2024-01-12
Payer: MEDICARE

## 2024-01-12 DIAGNOSIS — J82.81 CHRONIC EOSINOPHILIC PNEUMONIA: ICD-10-CM

## 2024-01-12 DIAGNOSIS — J30.89 NON-SEASONAL ALLERGIC RHINITIS, UNSPECIFIED TRIGGER: ICD-10-CM

## 2024-01-12 DIAGNOSIS — R06.09 CHRONIC DYSPNEA: Primary | ICD-10-CM

## 2024-01-12 DIAGNOSIS — R05.3 CHRONIC COUGH: ICD-10-CM

## 2024-01-12 RX ORDER — FLUTICASONE PROPIONATE AND SALMETEROL XINAFOATE 230; 21 UG/1; UG/1
2 AEROSOL, METERED RESPIRATORY (INHALATION)
Qty: 1 EACH | Refills: 11 | Status: SHIPPED | OUTPATIENT
Start: 2024-01-12

## 2024-01-12 NOTE — TELEPHONE ENCOUNTER
Patient called stating that his symbicort has gone up in price and is almost $100 and that he can not afford that. I called patient and informed him we could try advair he verbalized agreement. Informed patient that if that also is too expensive to let me know and we could try another inhaler. Patient verbalized understanding

## 2024-01-12 NOTE — TELEPHONE ENCOUNTER
Spoke with pharmacy and patient. Explained to patient that unfortunately symbicort does not have an assistance program anymore but I could try to send in Advair for patient. Explained that if advair is also too expensive to let me know and we would try something different. Patient verbalized understanding

## 2024-01-12 NOTE — TELEPHONE ENCOUNTER
Patient is needing assistance with getting his Symbicort due to price being too much. His pharmacy told him to call us to check on available programs or discount cards. Patient will not be home after 12PM today. He request we call him back before then. Please advise, thank you.

## 2024-01-22 ENCOUNTER — OFFICE VISIT (OUTPATIENT)
Dept: UROLOGY | Facility: CLINIC | Age: 64
End: 2024-01-22
Payer: MEDICARE

## 2024-01-22 VITALS
BODY MASS INDEX: 24.75 KG/M2 | HEIGHT: 71 IN | WEIGHT: 176.8 LBS | DIASTOLIC BLOOD PRESSURE: 64 MMHG | SYSTOLIC BLOOD PRESSURE: 119 MMHG

## 2024-01-22 DIAGNOSIS — N13.8 BPH WITH OBSTRUCTION/LOWER URINARY TRACT SYMPTOMS: ICD-10-CM

## 2024-01-22 DIAGNOSIS — N40.1 BPH WITH OBSTRUCTION/LOWER URINARY TRACT SYMPTOMS: ICD-10-CM

## 2024-01-22 DIAGNOSIS — R97.20 ELEVATED PROSTATE SPECIFIC ANTIGEN (PSA): Primary | ICD-10-CM

## 2024-01-22 LAB
BILIRUB BLD-MCNC: NEGATIVE MG/DL
CLARITY, POC: CLEAR
COLOR UR: YELLOW
EXPIRATION DATE: ABNORMAL
GLUCOSE UR STRIP-MCNC: NEGATIVE MG/DL
KETONES UR QL: NEGATIVE
LEUKOCYTE EST, POC: NEGATIVE
Lab: ABNORMAL
NITRITE UR-MCNC: NEGATIVE MG/ML
PH UR: 1 [PH] (ref 5–8)
PROT UR STRIP-MCNC: NEGATIVE MG/DL
RBC # UR STRIP: NEGATIVE /UL
SP GR UR: 1.02 (ref 1–1.03)
UROBILINOGEN UR QL: NORMAL

## 2024-01-22 PROCEDURE — 81003 URINALYSIS AUTO W/O SCOPE: CPT | Performed by: UROLOGY

## 2024-01-22 PROCEDURE — 1159F MED LIST DOCD IN RCRD: CPT | Performed by: UROLOGY

## 2024-01-22 PROCEDURE — 1160F RVW MEDS BY RX/DR IN RCRD: CPT | Performed by: UROLOGY

## 2024-01-22 PROCEDURE — 99214 OFFICE O/P EST MOD 30 MIN: CPT | Performed by: UROLOGY

## 2024-01-22 RX ORDER — TAMSULOSIN HYDROCHLORIDE 0.4 MG/1
2 CAPSULE ORAL DAILY
Qty: 180 CAPSULE | Refills: 3 | Status: SHIPPED | OUTPATIENT
Start: 2024-01-22 | End: 2025-01-16

## 2024-01-22 NOTE — PROGRESS NOTES
Chief Complaint  Elevated PSA (6mo f/u)    Subjective          Andry Harper presents to [facility] UROLOGY  History of Present Illness  History of Present Illness  6/4/19 - Patient presents in follow up. He is doing well. He denies bothersome urinary issues. He has not had a recent PSA.     7/22/19 - Patient presents in follow up. He is doing well. He has no urinary issues. He had an MRI of the prostate and this showed no areas suggestive of clinically significant prostate cancer. The volume of the gland was 77ml. The patient did have a recent PSA and this was 12. This was increased from 6 at the time of his biopsy. I have recommended following this.     12/18/2019: Patient is here for follow-up of his elevated PSA.  It was recently checked and is found to be 7.5 at this point.  That is down from 12.  It was at 6 at the time of his prostate biopsy which was negative.  He is also had an MRI of his prostate which showed no areas suggestive of clinically significant prostate cancer.     6/10/20:  He is having more issues with frequency and urgency.  He states he has a slow stream at times.  He would like to try medicine for that.  His most recent PSA was 8 which is down from a high of 12 but up from last check at 6.  He had a negative prostate MRI but that was over a year ago.       12/9/20:  His most recent PSA in Nov 2020 is now 7.71 which is down for him.  He had a negative prostate MRI in 2019 and again in June 2020.  He had a negative prostate biopsy in the past when his PSA was 6.  It has been as high as 12 in the past.     6/22/21:  Most recent PSA is now 10 in June 2021.  This is similar to his higher PSAs in the past.  His last prostate MRI was in June 2020 and was negative.  He has had two negative prostate MRIs.  He has also had a negative prostate biopsy in the past.      12/20/21: Most recent PSA from 12/13/2021 was 9.120, which is actually down a little bit from when he was seen in  "06/2021, but is similar to his baseline, which he normally runs between 7 and 9. He inquires about making any changes. The patient reports occasional nocturia. He denies any changes in his flow of urine. He reports the amount he urinates varies. The patient inquires about the Flomax, changing doses and his PSA count. He notes decreased times of urination at night.     6/20/22:    Mr. Harper, 1960, is a follow-up for elevated PSA and BPH. His most recent PSA from 02/01/2022 was 9.2 ng/mL, which is around his baseline. He has had 2 negative prostate MRIs and a negative prostate biopsy as well.     12/19/22:    He reports that he is doing well. His PSA has been about the same the past 3 times it was checked.     7/28/23:  His most recent PSA is down again, now 7.  It was 8 in Dec 2022. He had a prostate MRI that showed evidence of chronic prostatitis but no evidence of prostate cancer.    1/22/24: His most recent PSA is now 9.  Prostate MRI done in July 2023 was negative with no evidence of prostate cancer.  He does need refills of his Flomax.      Objective   Vital Signs:   /64 (BP Location: Left arm, Patient Position: Sitting, Cuff Size: Adult)   Ht 180.3 cm (70.98\")   Wt 80.2 kg (176 lb 12.8 oz)   BMI 24.67 kg/m²       Physical Exam  Vitals and nursing note reviewed.   Constitutional:       Appearance: Normal appearance. He is well-developed.   Pulmonary:      Effort: Pulmonary effort is normal.      Breath sounds: Normal air entry.   Neurological:      Mental Status: He is alert and oriented to person, place, and time.      Motor: Motor function is intact.   Psychiatric:         Mood and Affect: Mood normal.         Behavior: Behavior normal.          Result Review :   The following data was reviewed by: Michelle Hightower MD on 01/22/2024:    Results for orders placed or performed in visit on 01/22/24   POC Urinalysis Dipstick, Automated    Specimen: Urine   Result Value Ref Range    Color Yellow " Yellow, Straw, Dark Yellow, Nicole    Clarity, UA Clear Clear    Specific Gravity  1.020 1.005 - 1.030    pH, Urine 1.0 (A) 5.0 - 8.0    Leukocytes Negative Negative    Nitrite, UA Negative Negative    Protein, POC Negative Negative mg/dL    Glucose, UA Negative Negative mg/dL    Ketones, UA Negative Negative    Urobilinogen, UA Normal Normal, 0.2 E.U./dL    Bilirubin Negative Negative    Blood, UA Negative Negative    Lot Number [id number]     Expiration Date 12/30/24        PSA          2/8/2023    08:59 7/25/2023    09:12 1/11/2024    09:35   PSA   PSA 8.870  7.000  9.620               Assessment and Plan    Diagnoses and all orders for this visit:    1. Elevated prostate specific antigen (PSA) (Primary)  -     POC Urinalysis Dipstick, Automated  -     PSA DIAGNOSTIC; Future    2. BPH with obstruction/lower urinary tract symptoms  -     tamsulosin (FLOMAX) 0.4 MG capsule 24 hr capsule; Take 2 capsules by mouth Daily for 360 days.  Dispense: 180 capsule; Refill: 3    Repeat his PSA in 6 months.  It remains elevated but stable.  Recent prostate MRI in July 2023 was negative.  I will see him then.  I refilled his Flomax to last for the next year.  He is taking 2 of those a day.  Symptoms are controlled.  Follow-up in 6 months with PSA prior.          Follow Up       Return in about 6 months (around 7/22/2024) for PSA prior to visit.  Patient was given instructions and counseling regarding his condition or for health maintenance advice. Please see specific information pulled into the AVS if appropriate.

## 2024-02-01 ENCOUNTER — LAB (OUTPATIENT)
Dept: LAB | Facility: HOSPITAL | Age: 64
End: 2024-02-01
Payer: MEDICARE

## 2024-02-01 DIAGNOSIS — D69.6 THROMBOCYTOPENIA: ICD-10-CM

## 2024-02-01 DIAGNOSIS — R97.20 ELEVATED PROSTATE SPECIFIC ANTIGEN (PSA): ICD-10-CM

## 2024-02-01 LAB
BASOPHILS # BLD AUTO: 0.03 10*3/MM3 (ref 0–0.2)
BASOPHILS NFR BLD AUTO: 0.6 % (ref 0–1.5)
DEPRECATED RDW RBC AUTO: 44 FL (ref 37–54)
EOSINOPHIL # BLD AUTO: 0.13 10*3/MM3 (ref 0–0.4)
EOSINOPHIL NFR BLD AUTO: 2.5 % (ref 0.3–6.2)
ERYTHROCYTE [DISTWIDTH] IN BLOOD BY AUTOMATED COUNT: 13 % (ref 12.3–15.4)
HCT VFR BLD AUTO: 43.3 % (ref 37.5–51)
HGB BLD-MCNC: 14.7 G/DL (ref 13–17.7)
LYMPHOCYTES # BLD AUTO: 2.21 10*3/MM3 (ref 0.7–3.1)
LYMPHOCYTES NFR BLD AUTO: 42.7 % (ref 19.6–45.3)
MCH RBC QN AUTO: 31.3 PG (ref 26.6–33)
MCHC RBC AUTO-ENTMCNC: 33.9 G/DL (ref 31.5–35.7)
MCV RBC AUTO: 92.1 FL (ref 79–97)
MONOCYTES # BLD AUTO: 0.39 10*3/MM3 (ref 0.1–0.9)
MONOCYTES NFR BLD AUTO: 7.5 % (ref 5–12)
NEUTROPHILS NFR BLD AUTO: 2.39 10*3/MM3 (ref 1.7–7)
NEUTROPHILS NFR BLD AUTO: 46.3 % (ref 42.7–76)
PLATELET # BLD AUTO: 120 10*3/MM3 (ref 140–450)
PMV BLD AUTO: 11.1 FL (ref 6–12)
PSA SERPL-MCNC: 7.4 NG/ML (ref 0–4)
RBC # BLD AUTO: 4.7 10*6/MM3 (ref 4.14–5.8)
WBC NRBC COR # BLD AUTO: 5.17 10*3/MM3 (ref 3.4–10.8)

## 2024-02-01 PROCEDURE — 36415 COLL VENOUS BLD VENIPUNCTURE: CPT

## 2024-02-01 PROCEDURE — 85025 COMPLETE CBC W/AUTO DIFF WBC: CPT

## 2024-02-01 PROCEDURE — 84153 ASSAY OF PSA TOTAL: CPT

## 2024-02-07 ENCOUNTER — OFFICE VISIT (OUTPATIENT)
Dept: ONCOLOGY | Facility: HOSPITAL | Age: 64
End: 2024-02-07
Payer: MEDICARE

## 2024-02-07 ENCOUNTER — LAB (OUTPATIENT)
Dept: ONCOLOGY | Facility: HOSPITAL | Age: 64
End: 2024-02-07
Payer: MEDICARE

## 2024-02-07 VITALS
WEIGHT: 179.45 LBS | TEMPERATURE: 97.1 F | BODY MASS INDEX: 25.12 KG/M2 | DIASTOLIC BLOOD PRESSURE: 58 MMHG | OXYGEN SATURATION: 98 % | RESPIRATION RATE: 18 BRPM | HEART RATE: 73 BPM | HEIGHT: 71 IN | SYSTOLIC BLOOD PRESSURE: 113 MMHG

## 2024-02-07 DIAGNOSIS — D69.6 THROMBOCYTOPENIA: ICD-10-CM

## 2024-02-07 DIAGNOSIS — D64.9 ANEMIA, UNSPECIFIED TYPE: Primary | ICD-10-CM

## 2024-02-07 DIAGNOSIS — D64.9 ANEMIA, UNSPECIFIED TYPE: ICD-10-CM

## 2024-02-07 LAB
ALBUMIN SERPL-MCNC: 4.2 G/DL (ref 3.5–5.2)
ALBUMIN/GLOB SERPL: 2.1 G/DL
ALP SERPL-CCNC: 72 U/L (ref 39–117)
ALT SERPL W P-5'-P-CCNC: 28 U/L (ref 1–41)
ANION GAP SERPL CALCULATED.3IONS-SCNC: 7.7 MMOL/L (ref 5–15)
AST SERPL-CCNC: 18 U/L (ref 1–40)
BASOPHILS # BLD AUTO: 0.01 10*3/MM3 (ref 0–0.2)
BASOPHILS NFR BLD AUTO: 0.2 % (ref 0–1.5)
BILIRUB SERPL-MCNC: 0.5 MG/DL (ref 0–1.2)
BUN SERPL-MCNC: 16 MG/DL (ref 8–23)
BUN/CREAT SERPL: 22.5 (ref 7–25)
CALCIUM SPEC-SCNC: 9.1 MG/DL (ref 8.6–10.5)
CHLORIDE SERPL-SCNC: 103 MMOL/L (ref 98–107)
CO2 SERPL-SCNC: 28.3 MMOL/L (ref 22–29)
CREAT SERPL-MCNC: 0.71 MG/DL (ref 0.76–1.27)
DEPRECATED RDW RBC AUTO: 46.6 FL (ref 37–54)
EGFRCR SERPLBLD CKD-EPI 2021: 101.8 ML/MIN/1.73
EOSINOPHIL # BLD AUTO: 0.11 10*3/MM3 (ref 0–0.4)
EOSINOPHIL NFR BLD AUTO: 2.1 % (ref 0.3–6.2)
ERYTHROCYTE [DISTWIDTH] IN BLOOD BY AUTOMATED COUNT: 13.4 % (ref 12.3–15.4)
GLOBULIN UR ELPH-MCNC: 2 GM/DL
GLUCOSE SERPL-MCNC: 129 MG/DL (ref 65–99)
HCT VFR BLD AUTO: 43.6 % (ref 37.5–51)
HGB BLD-MCNC: 14.8 G/DL (ref 13–17.7)
IMM GRANULOCYTES # BLD AUTO: 0.03 10*3/MM3 (ref 0–0.05)
IMM GRANULOCYTES NFR BLD AUTO: 0.6 % (ref 0–0.5)
LYMPHOCYTES # BLD AUTO: 1.97 10*3/MM3 (ref 0.7–3.1)
LYMPHOCYTES # BLD MANUAL: 2.45 10*3/MM3 (ref 0.7–3.1)
LYMPHOCYTES NFR BLD AUTO: 37 % (ref 19.6–45.3)
LYMPHOCYTES NFR BLD MANUAL: 6 % (ref 5–12)
MCH RBC QN AUTO: 31.8 PG (ref 26.6–33)
MCHC RBC AUTO-ENTMCNC: 33.9 G/DL (ref 31.5–35.7)
MCV RBC AUTO: 93.8 FL (ref 79–97)
MONOCYTES # BLD AUTO: 0.4 10*3/MM3 (ref 0.1–0.9)
MONOCYTES # BLD: 0.32 10*3/MM3 (ref 0.1–0.9)
MONOCYTES NFR BLD AUTO: 7.5 % (ref 5–12)
NEUTROPHILS # BLD AUTO: 2.55 10*3/MM3 (ref 1.7–7)
NEUTROPHILS NFR BLD AUTO: 2.8 10*3/MM3 (ref 1.7–7)
NEUTROPHILS NFR BLD AUTO: 52.6 % (ref 42.7–76)
NEUTROPHILS NFR BLD MANUAL: 46 % (ref 42.7–76)
NEUTS BAND NFR BLD MANUAL: 2 % (ref 0–5)
PATHOLOGY REVIEW: YES
PLATELET # BLD AUTO: 120 10*3/MM3 (ref 140–450)
PMV BLD AUTO: 11 FL (ref 6–12)
POIKILOCYTOSIS BLD QL SMEAR: ABNORMAL
POTASSIUM SERPL-SCNC: 4.4 MMOL/L (ref 3.5–5.2)
PROT SERPL-MCNC: 6.2 G/DL (ref 6–8.5)
RBC # BLD AUTO: 4.65 10*6/MM3 (ref 4.14–5.8)
SMALL PLATELETS BLD QL SMEAR: ABNORMAL
SODIUM SERPL-SCNC: 139 MMOL/L (ref 136–145)
VARIANT LYMPHS NFR BLD MANUAL: 46 % (ref 19.6–45.3)
WBC MORPH BLD: NORMAL
WBC NRBC COR # BLD AUTO: 5.32 10*3/MM3 (ref 3.4–10.8)

## 2024-02-07 PROCEDURE — 80053 COMPREHEN METABOLIC PANEL: CPT

## 2024-02-07 PROCEDURE — 85025 COMPLETE CBC W/AUTO DIFF WBC: CPT

## 2024-02-07 PROCEDURE — 36415 COLL VENOUS BLD VENIPUNCTURE: CPT

## 2024-02-07 PROCEDURE — G0463 HOSPITAL OUTPT CLINIC VISIT: HCPCS | Performed by: INTERNAL MEDICINE

## 2024-02-07 NOTE — PROGRESS NOTES
Chief Complaint/Care Team    SHAHEEN, PT IS GETTING LABS AT [facility] Smith, Robert Eugene, MD    History of Present Illness     Diagnosis: Iron deficiency anemia (secondary to AVMs seen on colonoscopy from 2018 and cecum)    Mild Thrombocytopenia    Elevation in PSA- underwent MRI prostate on 7/2023 was negative for prostate cancer, monitored by urologist Dr. Hightower    Current Treatment: Active surveillance  Previous Treatment: Patient reports history of receiving PRBC transfusions 3 years ago for anemia    Andry Harper is a 63 y.o. male who presents to [facility] HEMATOLOGY & ONCOLOGY for evaluation of thrombocytopenia and iron deficiency anemia.  Reports history of thrombocytopenia for the past 5 to 6 years, denies any known liver disease, denies any family history of any congenital blood disorders or anemia.  He reports a history of his father with unknown type of lymphoma, history of brother with metastatic kidney cancer.    He denies any blood loss or melena.  Does report history of kinase level and basic labs a few weeks ago was evaluated the ER) number secondary to having difficulty stopping the bleeding.  He reports history of receiving PRBC transfusion 3 years ago.  He is currently not taking any iron tablets and has not received IV iron infusion.    Regarding iron deficiency anemia, history of AVM per colonoscopy from October 2018 which revealed a single medium angioectasia in the cecum, status post APC to the right colon, also had 2 sessile polyps that were removed.    Review of Systems   Constitutional:  Negative for appetite change, diaphoresis, fatigue, fever, unexpected weight gain and unexpected weight loss.   HENT:  Negative for hearing loss, mouth sores, sore throat, swollen glands, trouble swallowing and voice change.    Eyes:  Negative for blurred vision.   Respiratory:  Negative for cough, shortness of breath and wheezing.    Cardiovascular:   "Negative for chest pain and palpitations.   Gastrointestinal:  Negative for abdominal pain, blood in stool, constipation, diarrhea, nausea and vomiting.   Endocrine: Negative for cold intolerance and heat intolerance.   Genitourinary:  Negative for difficulty urinating, dysuria, frequency, hematuria and urinary incontinence.   Musculoskeletal:  Negative for arthralgias, back pain and myalgias.   Skin:  Negative for rash, skin lesions and wound.   Neurological:  Negative for dizziness, seizures, weakness, numbness and headache.   Hematological:  Does not bruise/bleed easily.   Psychiatric/Behavioral:  Negative for depressed mood. The patient is not nervous/anxious.    All other systems reviewed and are negative.       Oncology/Hematology History    No history exists.       Objective     Vitals:    02/07/24 0829   BP: 113/58   Pulse: 73   Resp: 18   Temp: 97.1 °F (36.2 °C)   TempSrc: Temporal   SpO2: 98%   Weight: 81.4 kg (179 lb 7.3 oz)   Height: 180.3 cm (70.98\")   PainSc: 0-No pain     ECOG score: 0         PHQ-9 Total Score:         Physical Exam  Vitals reviewed. Exam conducted with a chaperone present.   Constitutional:       General: He is not in acute distress.  HENT:      Head: Normocephalic and atraumatic.   Eyes:      Extraocular Movements: Extraocular movements intact.      Conjunctiva/sclera: Conjunctivae normal.   Pulmonary:      Effort: Pulmonary effort is normal.   Skin:     Findings: No bruising.      Comments: No petechiae visualized   Neurological:      Mental Status: He is alert and oriented to person, place, and time.           Past Medical History     Past Medical History:   Diagnosis Date    Anemia     AVM (arteriovenous malformation)     cecal AVM    Colon polyp     History of transfusion     Hyperlipidemia     Hypertension     Lung disease      Current Outpatient Medications on File Prior to Visit   Medication Sig Dispense Refill    albuterol sulfate  (90 Base) MCG/ACT inhaler Inhale 2 " puffs Every 4 (Four) Hours As Needed for Wheezing.      atorvastatin (LIPITOR) 20 MG tablet Take 1 tablet by mouth Daily. 30 tablet 5    fluticasone-salmeterol (Advair HFA) 230-21 MCG/ACT inhaler Inhale 2 puffs 2 (Two) Times a Day. 1 each 11    montelukast (SINGULAIR) 10 MG tablet Take 1 tablet by mouth Every Night. 30 tablet 11    tamsulosin (FLOMAX) 0.4 MG capsule 24 hr capsule Take 2 capsules by mouth Daily for 360 days. 180 capsule 3    levocetirizine (Xyzal) 5 MG tablet Take 1 tablet by mouth Every Evening. (Patient not taking: Reported on 1/22/2024) 30 tablet 5     No current facility-administered medications on file prior to visit.      Allergies   Allergen Reactions    Penicillins Unknown - Low Severity     Past Surgical History:   Procedure Laterality Date    COLONOSCOPY  10/26/2018    5 YR RECALL - POLYPS    COLONOSCOPY N/A 8/14/2023    Procedure: COLONOSCOPY WITH POLYPECTOMY/SNARE;  Surgeon: Ion Carrera MD;  Location: [facility] ENDOSCOPY;  Service: Gastroenterology;  Laterality: N/A;  COLON POLYP  POOR BOWEL PREP    COLONOSCOPY N/A 10/16/2023    Procedure: COLONOSCOPY;  Surgeon: Ion Carrera MD;  Location: [facility] ENDOSCOPY;  Service: Gastroenterology;  Laterality: N/A;  NORMAL COLONOSCOPY    CYST REMOVAL      Neck    LUNG BIOPSY Right      Social History     Socioeconomic History    Marital status: Single   Tobacco Use    Smoking status: Never    Smokeless tobacco: Never   Vaping Use    Vaping Use: Never used   Substance and Sexual Activity    Alcohol use: Not Currently    Drug use: Never    Sexual activity: Defer     Family History   Problem Relation Age of Onset    Stroke Mother     Lymphoma Father     Cancer Father     Heart disease Brother     Heart attack Brother     Stroke Brother     Diabetes Brother     Cancer Brother     Kidney disease Brother     Colon cancer Neg Hx        Results     Result Review   The following data was reviewed by: Jamarcus Villela MD on 02/07/2024:  Lab Results    Component Value Date    HGB 14.8 02/07/2024    HCT 43.6 02/07/2024    MCV 93.8 02/07/2024     (L) 02/07/2024    WBC 5.32 02/07/2024    NEUTROABS 2.80 02/07/2024    LYMPHSABS 1.97 02/07/2024    MONOSABS 0.40 02/07/2024    EOSABS 0.11 02/07/2024    BASOSABS 0.01 02/07/2024     Lab Results   Component Value Date    GLUCOSE 98 10/06/2023    BUN 12 10/06/2023    CREATININE 0.84 10/06/2023     10/06/2023    K 4.2 10/06/2023     10/06/2023    CO2 25.5 10/06/2023    CALCIUM 9.3 10/06/2023    PROTEINTOT 6.9 10/06/2023    ALBUMIN 4.3 10/06/2023    BILITOT 0.6 10/06/2023    ALKPHOS 77 10/06/2023    AST 19 10/06/2023    ALT 19 10/06/2023     Lab Results   Component Value Date    MG 2.0 07/10/2021           No radiology results for the last day       Assessment & Plan     Diagnoses and all orders for this visit:    1. Anemia, unspecified type (Primary)  -     CBC & Differential; Future  -     Peripheral Blood Smear; Future  -     Comprehensive Metabolic Panel; Future  -     US Liver; Future    2. Thrombocytopenia  -     CBC & Differential; Future  -     Peripheral Blood Smear; Future  -     Comprehensive Metabolic Panel; Future  -     US Liver; Future        Andry Harper is a 63 y.o. male who presents to [facility] HEMATOLOGY & ONCOLOGY for Iron deficiency anemia (secondary to AVMs seen on colonoscopy from 2018 and cecum) and Mild Thrombocytopenia.    Mild thrombocytopenia  - Patient with persistent mild decrease in platelet count ranging between 109-120 for the past 3 to 4 years  -Discussed with patient plan to obtain peripheral blood smear, repeat CBC, CMP, ultrasound of liver to assess for cause of his thrombocytopenia  -Given mild nature of thrombocytopenia will likely observe and perform additional workup if platelet count suddenly drops or develops other cytopenias  -Patient without any blood loss or melena today      Iron deficiency anemia  -secondary to AVMs seen on colonoscopy  from 2018 and cecum  -Most recent hemoglobin normal at 14.7  -Reported blood loss or melena today  --Recommend continued monitoring via colonoscopy EGD per GI recommendations.  -Continue to monitor for recurrence will repeat iron studies at next clinic appointment.    Plan for patient follow-up in 6 weeks discussed results of ultrasound of liver and labs from today.    Please note that portions of this note were completed with a voice recognition program.    Electronically signed by Jamarcus Villela MD, 02/07/24, 10:04 AM EST.      Follow Up     I spent 30 minutes caring for Andry on this date of service. This time includes time spent by me in the following activities:preparing for the visit, reviewing tests, obtaining and/or reviewing a separately obtained history, performing a medically appropriate examination and/or evaluation , counseling and educating the patient/family/caregiver, ordering medications, tests, or procedures, referring and communicating with other health care professionals , documenting information in the medical record, independently interpreting results and communicating that information with the patient/family/caregiver, and care coordination.    This is an acute or chronic illness that poses a threat to life or bodily function. The above treatment plan involves a high risk of complications and/or mortality of patient management.    The patient was seen and examined. Work by the provider also included review and/or ordering of lab tests, review and/or ordering of radiology tests, review and/or ordering of medicine tests, discussion with other physicians or providers, independent review of data, obtaining old records, review/summation of old records, and/or other review.    I have reviewed the family history, social history, and past medical history for this patient. Previous information and data has been reviewed and updated as needed. I have reviewed and verified the chief complaint, history, and  other documentation. The patient was interviewed and examined in the clinic and the chart reviewed. The previous observations, recommendations, and conclusions were reviewed including those of other providers.     The plan was discussed with the patient and/or family. The patient was given time to ask questions and these questions were answered. At the conclusion of their visit they had no additional questions or concerns and all questions were answered to their satisfaction.    Patient was given instructions and counseling regarding his condition or for health maintenance advice. Please see specific information pulled into the AVS if appropriate.

## 2024-02-20 ENCOUNTER — TELEPHONE (OUTPATIENT)
Dept: ONCOLOGY | Facility: HOSPITAL | Age: 64
End: 2024-02-20
Payer: MEDICARE

## 2024-02-20 NOTE — TELEPHONE ENCOUNTER
Spoke with patient, advised that labs were unremarkable, with a mild decrease in platelet count. Instructed to maintain all follow up visits and to contact the office with any questions or concerns. Patient verbalized understanding of all information discussed.

## 2024-02-20 NOTE — TELEPHONE ENCOUNTER
----- Message from Jamarcus Villela MD sent at 2/19/2024  1:23 PM EST -----  Regarding: RE: labs  Please let pt know that CMP was unremarkable, CBC showed mild decrease in plts, peripheral blood smear showed decrease in plt count but was otherwise unremarkable. Thanks.   ----- Message -----  From: Jamarcus Villela MD  Sent: 2/7/2024  10:05 AM EST  To: Jamarcus Villela MD  Subject: labs                                             Review labs from today.

## 2024-03-15 ENCOUNTER — HOSPITAL ENCOUNTER (OUTPATIENT)
Dept: ULTRASOUND IMAGING | Facility: HOSPITAL | Age: 64
Discharge: HOME | End: 2024-03-15
Payer: MEDICARE

## 2024-03-15 DIAGNOSIS — D69.6 THROMBOCYTOPENIA: ICD-10-CM

## 2024-03-15 DIAGNOSIS — D64.9 ANEMIA, UNSPECIFIED TYPE: ICD-10-CM

## 2024-03-15 PROCEDURE — 76705 ECHO EXAM OF ABDOMEN: CPT

## 2024-03-19 ENCOUNTER — OFFICE VISIT (OUTPATIENT)
Dept: ONCOLOGY | Facility: HOSPITAL | Age: 64
End: 2024-03-19
Payer: MEDICARE

## 2024-03-19 VITALS
BODY MASS INDEX: 25.49 KG/M2 | HEIGHT: 71 IN | DIASTOLIC BLOOD PRESSURE: 57 MMHG | TEMPERATURE: 97.3 F | HEART RATE: 63 BPM | OXYGEN SATURATION: 98 % | WEIGHT: 182.1 LBS | RESPIRATION RATE: 18 BRPM | SYSTOLIC BLOOD PRESSURE: 113 MMHG

## 2024-03-19 DIAGNOSIS — D69.6 THROMBOCYTOPENIA: ICD-10-CM

## 2024-03-19 DIAGNOSIS — D64.9 ANEMIA, UNSPECIFIED TYPE: Primary | ICD-10-CM

## 2024-03-19 PROCEDURE — G0463 HOSPITAL OUTPT CLINIC VISIT: HCPCS | Performed by: INTERNAL MEDICINE

## 2024-03-19 NOTE — PROGRESS NOTES
Chief Complaint/Care Team   SHAHEEN,    Keenan Craft MD Smith, Robert Eugene, MD    History of Present Illness     Diagnosis: Iron deficiency anemia (secondary to AVMs seen on colonoscopy from 2018 and cecum)    Mild Thrombocytopenia    Elevation in PSA- underwent MRI prostate on 7/2023 was negative for prostate cancer, monitored by urologist Dr. Hightower    Current Treatment: Active surveillance  Previous Treatment: Patient reports history of receiving PRBC transfusions 3 years ago for anemia    Andry Harper is a 63 y.o. male who presents to [facility] for evaluation of thrombocytopenia and iron deficiency anemia.  Reports history of thrombocytopenia for the past 5 to 6 years, denies any known liver disease, denies any family history of any congenital blood disorders or anemia.  He reports a history of his father with unknown type of lymphoma, history of brother with metastatic kidney cancer.    He denies any blood loss or melena.  Does report history of kinase level and basic labs a few weeks ago was evaluated the ER) number secondary to having difficulty stopping the bleeding.  He reports history of receiving PRBC transfusion 3 years ago.  He is currently not taking any iron tablets and has not received IV iron infusion.    Regarding iron deficiency anemia, history of AVM per colonoscopy from October 2018 which revealed a single medium angioectasia in the cecum, status post APC to the right colon, also had 2 sessile polyps that were removed.    Interval History: Pt here to follow up regarding SHAHEEN and thrombocytopenia, pt without any blood loss, melena, bruising.     Review of Systems   Constitutional:  Negative for appetite change, diaphoresis, fatigue, fever, unexpected weight gain and unexpected weight loss.   HENT:  Negative for hearing loss, mouth sores, sore throat, swollen glands, trouble swallowing and voice change.    Eyes:  Negative for blurred vision.  "  Respiratory:  Negative for cough, shortness of breath and wheezing.    Cardiovascular:  Negative for chest pain and palpitations.   Gastrointestinal:  Negative for abdominal pain, blood in stool, constipation, diarrhea, nausea and vomiting.   Endocrine: Negative for cold intolerance and heat intolerance.   Genitourinary:  Negative for difficulty urinating, dysuria, frequency, hematuria and urinary incontinence.   Musculoskeletal:  Negative for arthralgias, back pain and myalgias.   Skin:  Negative for rash, skin lesions and wound.   Neurological:  Negative for dizziness, seizures, weakness, numbness and headache.   Hematological:  Does not bruise/bleed easily.   Psychiatric/Behavioral:  Negative for depressed mood. The patient is not nervous/anxious.    All other systems reviewed and are negative.       Oncology/Hematology History    No history exists.       Objective     Vitals:    03/19/24 1433   BP: 113/57   Pulse: 63   Resp: 18   Temp: 97.3 °F (36.3 °C)   TempSrc: Temporal   SpO2: 98%   Weight: 82.6 kg (182 lb 1.6 oz)   Height: 180.3 cm (70.98\")   PainSc: 0-No pain       ECOG score: 0         PHQ-9 Total Score:         Physical Exam  Vitals reviewed. Exam conducted with a chaperone present.   Constitutional:       General: He is not in acute distress.  HENT:      Head: Normocephalic and atraumatic.   Eyes:      Extraocular Movements: Extraocular movements intact.      Conjunctiva/sclera: Conjunctivae normal.   Pulmonary:      Effort: Pulmonary effort is normal.   Skin:     Findings: No bruising.      Comments: No petechiae visualized   Neurological:      Mental Status: He is alert and oriented to person, place, and time.           Past Medical History     Past Medical History:   Diagnosis Date    Anemia     AVM (arteriovenous malformation)     cecal AVM    Colon polyp     History of transfusion     Hyperlipidemia     Hypertension     Lung disease      Current Outpatient Medications on File Prior to Visit "   Medication Sig Dispense Refill    albuterol sulfate  (90 Base) MCG/ACT inhaler Inhale 2 puffs Every 4 (Four) Hours As Needed for Wheezing.      atorvastatin (LIPITOR) 20 MG tablet Take 1 tablet by mouth Daily. 30 tablet 5    fluticasone-salmeterol (Advair HFA) 230-21 MCG/ACT inhaler Inhale 2 puffs 2 (Two) Times a Day. 1 each 11    montelukast (SINGULAIR) 10 MG tablet Take 1 tablet by mouth Every Night. 30 tablet 11    tamsulosin (FLOMAX) 0.4 MG capsule 24 hr capsule Take 2 capsules by mouth Daily for 360 days. 180 capsule 3    levocetirizine (Xyzal) 5 MG tablet Take 1 tablet by mouth Every Evening. (Patient not taking: Reported on 1/22/2024) 30 tablet 5     No current facility-administered medications on file prior to visit.      Allergies   Allergen Reactions    Penicillins Unknown - Low Severity     Past Surgical History:   Procedure Laterality Date    COLONOSCOPY  10/26/2018    5 YR RECALL - POLYPS    COLONOSCOPY N/A 8/14/2023    Procedure: COLONOSCOPY WITH POLYPECTOMY/SNARE;  Surgeon: Ion Carrera MD;  Location: [facility] ENDOSCOPY;  Service: Gastroenterology;  Laterality: N/A;  COLON POLYP  POOR BOWEL PREP    COLONOSCOPY N/A 10/16/2023    Procedure: COLONOSCOPY;  Surgeon: Ion Carrera MD;  Location: [facility] ENDOSCOPY;  Service: Gastroenterology;  Laterality: N/A;  NORMAL COLONOSCOPY    CYST REMOVAL      Neck    LUNG BIOPSY Right      Social History     Socioeconomic History    Marital status: Single   Tobacco Use    Smoking status: Never    Smokeless tobacco: Never   Vaping Use    Vaping status: Never Used   Substance and Sexual Activity    Alcohol use: Not Currently    Drug use: Never    Sexual activity: Defer     Family History   Problem Relation Age of Onset    Stroke Mother     Lymphoma Father     Cancer Father     Heart disease Brother     Heart attack Brother     Stroke Brother     Diabetes Brother     Cancer Brother     Kidney disease Brother     Colon cancer Neg Hx        Results      Result Review   The following data was reviewed by: Jamarcus Villela MD on 02/07/2024:  Lab Results   Component Value Date    HGB 14.8 02/07/2024    HCT 43.6 02/07/2024    MCV 93.8 02/07/2024     (L) 02/07/2024    WBC 5.32 02/07/2024    NEUTROABS 2.80 02/07/2024    NEUTROABS 2.55 02/07/2024    LYMPHSABS 1.97 02/07/2024    MONOSABS 0.40 02/07/2024    EOSABS 0.11 02/07/2024    BASOSABS 0.01 02/07/2024     Lab Results   Component Value Date    GLUCOSE 129 (H) 02/07/2024    BUN 16 02/07/2024    CREATININE 0.71 (L) 02/07/2024     02/07/2024    K 4.4 02/07/2024     02/07/2024    CO2 28.3 02/07/2024    CALCIUM 9.1 02/07/2024    PROTEINTOT 6.2 02/07/2024    ALBUMIN 4.2 02/07/2024    BILITOT 0.5 02/07/2024    ALKPHOS 72 02/07/2024    AST 18 02/07/2024    ALT 28 02/07/2024     Lab Results   Component Value Date    MG 2.0 07/10/2021           No radiology results for the last day  US Liver    Result Date: 3/15/2024  Impression:   1. Increased echogenicity of the liver parenchyma relative to the right kidney which is suggestive of underlying hepatic steatosis. 2. Normal caliber common bile duct.       CATARINA CHAVEZ MD       Electronically Signed and Approved By: CATARINA CHAVEZ MD on 3/15/2024 at 13:10              Assessment & Plan     Diagnoses and all orders for this visit:    1. Anemia, unspecified type (Primary)  -     CBC & Differential; Future  -     Comprehensive Metabolic Panel; Future  -     Ferritin; Future  -     Iron Profile; Future    2. Thrombocytopenia  -     CBC & Differential; Future  -     Comprehensive Metabolic Panel; Future  -     Ferritin; Future  -     Iron Profile; Future          Andry Harper is a 63 y.o. male who presents to [facility]AT[facility] for Iron deficiency anemia (secondary to AVMs seen on colonoscopy from 2018 and cecum) and Mild Thrombocytopenia.    Mild thrombocytopenia  - Patient with persistent mild decrease in platelet count  ranging between 109-120 for the past 3 to 4 years  -Discussed with patient plan to obtain peripheral blood smear, repeat CBC, CMP, ultrasound of liver to assess for cause of his thrombocytopenia  -discussed results of US of liver which revealed hepatic steatosis, recommended diet modification and weight loss  -discussed results of peripheral blood smear from 2/9/2024 which revealed thrombocytopenia, normal morphology of plts, otherwise normal WBC and RBCs  -Given mild nature of thrombocytopenia will continue to observe and perform additional workup if platelet count suddenly drops or develops other cytopenias  -Patient again without any blood loss or melena today      Iron deficiency anemia  -secondary to AVMs seen on colonoscopy from 2018 and cecum  -Most recent hemoglobin normal at 14.8  -no reported blood loss or melena today  --Recommend continued monitoring via colonoscopy EGD per GI recommendations.  -Continue to monitor for recurrence will repeat iron studies at next clinic appointment.    Plan for patient follow-up in 6 months with repeat labs CBC, CMP and Iron studies.    Please note that portions of this note were completed with a voice recognition program.    Electronically signed by Jamarcus Villela MD, 03/19/24, 4:33 PM EDT.        Follow Up     I spent 30 minutes caring for Andry on this date of service. This time includes time spent by me in the following activities:preparing for the visit, reviewing tests, obtaining and/or reviewing a separately obtained history, performing a medically appropriate examination and/or evaluation , counseling and educating the patient/family/caregiver, ordering medications, tests, or procedures, referring and communicating with other health care professionals , documenting information in the medical record, independently interpreting results and communicating that information with the patient/family/caregiver, and care coordination.    This is an acute or chronic illness  that poses a threat to life or bodily function. The above treatment plan involves a high risk of complications and/or mortality of patient management.    The patient was seen and examined. Work by the provider also included review and/or ordering of lab tests, review and/or ordering of radiology tests, review and/or ordering of medicine tests, discussion with other physicians or providers, independent review of data, obtaining old records, review/summation of old records, and/or other review.    I have reviewed the family history, social history, and past medical history for this patient. Previous information and data has been reviewed and updated as needed. I have reviewed and verified the chief complaint, history, and other documentation. The patient was interviewed and examined in the clinic and the chart reviewed. The previous observations, recommendations, and conclusions were reviewed including those of other providers.     The plan was discussed with the patient and/or family. The patient was given time to ask questions and these questions were answered. At the conclusion of their visit they had no additional questions or concerns and all questions were answered to their satisfaction.    Patient was given instructions and counseling regarding his condition or for health maintenance advice. Please see specific information pulled into the AVS if appropriate.

## 2024-03-30 DIAGNOSIS — E78.2 MIXED HYPERLIPIDEMIA: ICD-10-CM

## 2024-04-01 RX ORDER — ATORVASTATIN CALCIUM 20 MG/1
20 TABLET, FILM COATED ORAL DAILY
Qty: 30 TABLET | Refills: 5 | Status: SHIPPED | OUTPATIENT
Start: 2024-04-01

## 2024-04-22 ENCOUNTER — HOSPITAL ENCOUNTER (OUTPATIENT)
Dept: CT IMAGING | Facility: HOSPITAL | Age: 64
Discharge: HOME | End: 2024-04-22
Admitting: NURSE PRACTITIONER
Payer: MEDICARE

## 2024-04-22 DIAGNOSIS — R93.89 ABNORMAL CHEST CT: ICD-10-CM

## 2024-04-22 DIAGNOSIS — J21.9 BRONCHIOLITIS: ICD-10-CM

## 2024-04-22 PROCEDURE — 71250 CT THORAX DX C-: CPT

## 2024-04-24 DIAGNOSIS — R06.89 AIRWAY CLEARANCE IMPAIRMENT: Primary | ICD-10-CM

## 2024-05-07 ENCOUNTER — OFFICE VISIT (OUTPATIENT)
Dept: PULMONOLOGY | Facility: CLINIC | Age: 64
End: 2024-05-07
Payer: MEDICARE

## 2024-05-07 VITALS
OXYGEN SATURATION: 99 % | RESPIRATION RATE: 18 BRPM | DIASTOLIC BLOOD PRESSURE: 65 MMHG | HEIGHT: 71 IN | SYSTOLIC BLOOD PRESSURE: 121 MMHG | BODY MASS INDEX: 25.48 KG/M2 | TEMPERATURE: 97.5 F | HEART RATE: 69 BPM | WEIGHT: 182 LBS

## 2024-05-07 DIAGNOSIS — J47.9 BRONCHIECTASIS WITHOUT ACUTE EXACERBATION: ICD-10-CM

## 2024-05-07 DIAGNOSIS — Z23 ENCOUNTER FOR IMMUNIZATION: Primary | ICD-10-CM

## 2024-05-07 DIAGNOSIS — R05.3 CHRONIC COUGH: ICD-10-CM

## 2024-05-07 DIAGNOSIS — J30.89 NON-SEASONAL ALLERGIC RHINITIS, UNSPECIFIED TRIGGER: ICD-10-CM

## 2024-05-07 DIAGNOSIS — R06.09 CHRONIC DYSPNEA: ICD-10-CM

## 2024-05-07 PROCEDURE — 1159F MED LIST DOCD IN RCRD: CPT | Performed by: INTERNAL MEDICINE

## 2024-05-07 PROCEDURE — 1160F RVW MEDS BY RX/DR IN RCRD: CPT | Performed by: INTERNAL MEDICINE

## 2024-05-07 PROCEDURE — 99214 OFFICE O/P EST MOD 30 MIN: CPT | Performed by: INTERNAL MEDICINE

## 2024-05-07 PROCEDURE — 90677 PCV20 VACCINE IM: CPT | Performed by: INTERNAL MEDICINE

## 2024-05-07 PROCEDURE — G0009 ADMIN PNEUMOCOCCAL VACCINE: HCPCS | Performed by: INTERNAL MEDICINE

## 2024-05-07 RX ORDER — FLUTICASONE PROPIONATE AND SALMETEROL XINAFOATE 230; 21 UG/1; UG/1
2 AEROSOL, METERED RESPIRATORY (INHALATION)
Qty: 1 EACH | Refills: 11 | Status: SHIPPED | OUTPATIENT
Start: 2024-05-07

## 2024-05-07 RX ORDER — SODIUM FLUORIDE 6 MG/ML
PASTE, DENTIFRICE DENTAL
COMMUNITY
Start: 2024-05-02

## 2024-05-07 RX ORDER — MONTELUKAST SODIUM 10 MG/1
10 TABLET ORAL NIGHTLY
Qty: 30 TABLET | Refills: 11 | Status: SHIPPED | OUTPATIENT
Start: 2024-05-07

## 2024-05-16 ENCOUNTER — OFFICE VISIT (OUTPATIENT)
Dept: FAMILY MEDICINE CLINIC | Facility: CLINIC | Age: 64
End: 2024-05-16
Payer: MEDICARE

## 2024-05-16 ENCOUNTER — TELEPHONE (OUTPATIENT)
Dept: FAMILY MEDICINE CLINIC | Facility: CLINIC | Age: 64
End: 2024-05-16

## 2024-05-16 VITALS
HEART RATE: 91 BPM | SYSTOLIC BLOOD PRESSURE: 110 MMHG | OXYGEN SATURATION: 96 % | TEMPERATURE: 97.5 F | BODY MASS INDEX: 25.15 KG/M2 | DIASTOLIC BLOOD PRESSURE: 60 MMHG | WEIGHT: 180.2 LBS

## 2024-05-16 DIAGNOSIS — M54.50 CHRONIC BILATERAL LOW BACK PAIN WITHOUT SCIATICA: Primary | ICD-10-CM

## 2024-05-16 DIAGNOSIS — R73.09 ELEVATED RANDOM BLOOD GLUCOSE LEVEL: ICD-10-CM

## 2024-05-16 DIAGNOSIS — G89.29 CHRONIC BILATERAL LOW BACK PAIN WITHOUT SCIATICA: Primary | ICD-10-CM

## 2024-05-16 DIAGNOSIS — E78.2 MIXED HYPERLIPIDEMIA: ICD-10-CM

## 2024-05-16 LAB
ALBUMIN SERPL-MCNC: 4.5 G/DL (ref 3.5–5.2)
ALBUMIN/GLOB SERPL: 1.9 G/DL
ALP SERPL-CCNC: 88 U/L (ref 39–117)
ALT SERPL W P-5'-P-CCNC: 29 U/L (ref 1–41)
ANION GAP SERPL CALCULATED.3IONS-SCNC: 11 MMOL/L (ref 5–15)
AST SERPL-CCNC: 20 U/L (ref 1–40)
BILIRUB SERPL-MCNC: 0.7 MG/DL (ref 0–1.2)
BUN SERPL-MCNC: 15 MG/DL (ref 8–23)
BUN/CREAT SERPL: 19.2 (ref 7–25)
CALCIUM SPEC-SCNC: 9.4 MG/DL (ref 8.6–10.5)
CHLORIDE SERPL-SCNC: 105 MMOL/L (ref 98–107)
CHOLEST SERPL-MCNC: 129 MG/DL (ref 0–200)
CO2 SERPL-SCNC: 26 MMOL/L (ref 22–29)
CREAT SERPL-MCNC: 0.78 MG/DL (ref 0.76–1.27)
EGFRCR SERPLBLD CKD-EPI 2021: 98.4 ML/MIN/1.73
GLOBULIN UR ELPH-MCNC: 2.4 GM/DL
GLUCOSE SERPL-MCNC: 110 MG/DL (ref 65–99)
HBA1C MFR BLD: 6 % (ref 4.8–5.6)
HDLC SERPL-MCNC: 37 MG/DL (ref 40–60)
LDLC SERPL CALC-MCNC: 74 MG/DL (ref 0–100)
LDLC/HDLC SERPL: 1.98 {RATIO}
POTASSIUM SERPL-SCNC: 4.8 MMOL/L (ref 3.5–5.2)
PROT SERPL-MCNC: 6.9 G/DL (ref 6–8.5)
SODIUM SERPL-SCNC: 142 MMOL/L (ref 136–145)
TRIGL SERPL-MCNC: 94 MG/DL (ref 0–150)
VLDLC SERPL-MCNC: 18 MG/DL (ref 5–40)

## 2024-05-16 PROCEDURE — G2211 COMPLEX E/M VISIT ADD ON: HCPCS | Performed by: FAMILY MEDICINE

## 2024-05-16 PROCEDURE — 99214 OFFICE O/P EST MOD 30 MIN: CPT | Performed by: FAMILY MEDICINE

## 2024-05-16 PROCEDURE — 1126F AMNT PAIN NOTED NONE PRSNT: CPT | Performed by: FAMILY MEDICINE

## 2024-05-16 PROCEDURE — 80061 LIPID PANEL: CPT | Performed by: FAMILY MEDICINE

## 2024-05-16 PROCEDURE — 1159F MED LIST DOCD IN RCRD: CPT | Performed by: FAMILY MEDICINE

## 2024-05-16 PROCEDURE — 1160F RVW MEDS BY RX/DR IN RCRD: CPT | Performed by: FAMILY MEDICINE

## 2024-05-16 PROCEDURE — 83036 HEMOGLOBIN GLYCOSYLATED A1C: CPT | Performed by: FAMILY MEDICINE

## 2024-05-16 PROCEDURE — 80053 COMPREHEN METABOLIC PANEL: CPT | Performed by: FAMILY MEDICINE

## 2024-05-16 RX ORDER — CYCLOBENZAPRINE HCL 5 MG
5 TABLET ORAL 3 TIMES DAILY PRN
Qty: 90 TABLET | Refills: 0 | Status: SHIPPED | OUTPATIENT
Start: 2024-05-16

## 2024-05-16 RX ORDER — ATORVASTATIN CALCIUM 20 MG/1
20 TABLET, FILM COATED ORAL DAILY
Qty: 30 TABLET | Refills: 5 | Status: SHIPPED | OUTPATIENT
Start: 2024-05-16

## 2024-05-16 NOTE — PROGRESS NOTES
Venipuncture Blood Specimen Collection  Venipuncture performed in left arm  by Peace Chanel with good hemostasis. Patient tolerated the procedure well without complications.   05/16/24   Peace Chanel

## 2024-05-16 NOTE — PROGRESS NOTES
Chief Complaint  Med Refill, Hyperlipidemia, and Back Pain    Subjective          Andry Harper presents to [facility]  History of Present Illness  Pt has had chronic low back pain x several months  Hyperlipidemia  This is a chronic problem. The current episode started more than 1 year ago. The problem is controlled. Recent lipid tests were reviewed and are variable. There are no known factors aggravating his hyperlipidemia. Pertinent negatives include no chest pain, focal sensory loss, focal weakness, leg pain, myalgias or shortness of breath. Current antihyperlipidemic treatment includes statins. The current treatment provides significant improvement of lipids. There are no compliance problems.  Risk factors for coronary artery disease include dyslipidemia, family history and male sex.   Back Pain  This is a chronic problem. The current episode started more than 1 year ago. The problem occurs intermittently. The problem is unchanged. The pain is present in the lumbar spine. The quality of the pain is described as aching. The pain does not radiate. The pain is moderate. The symptoms are aggravated by bending. Pertinent negatives include no abdominal pain, bladder incontinence, bowel incontinence, chest pain, dysuria, fever, headaches, leg pain, numbness, paresis, paresthesias, pelvic pain, perianal numbness, tingling, weakness or weight loss. He has tried nothing for the symptoms.       BMI is >= 25 and <30. (Overweight) The following options were offered after discussion;: exercise counseling/recommendations and nutrition counseling/recommendations       Objective   Allergies   Allergen Reactions    Penicillins Unknown - Low Severity     Immunization History   Administered Date(s) Administered    COVID-19 (PFIZER) Purple Cap Monovalent 04/05/2021, 04/26/2021    Flu Vaccine Intradermal Quad 18-64YR 10/07/2020    Flu Vaccine Quad PF >36MO 10/01/2018, 09/30/2019    Fluzone (or  Fluarix & Flulaval for VFC) >6mos 10/21/2022    Fluzone High-Dose 65+yrs 10/06/2023    Influenza, Unspecified 11/09/2021    Pneumococcal Conjugate 20-Valent (PCV20) 05/07/2024    Pneumococcal Polysaccharide (PPSV23) 08/25/2021    Tdap 04/10/2023     Past Medical History:   Diagnosis Date    Anemia     AVM (arteriovenous malformation)     cecal AVM    Colon polyp     History of transfusion     Hyperlipidemia     Hypertension     Lung disease       Past Surgical History:   Procedure Laterality Date    COLONOSCOPY  10/26/2018    5 YR RECALL - POLYPS    COLONOSCOPY N/A 8/14/2023    Procedure: COLONOSCOPY WITH POLYPECTOMY/SNARE;  Surgeon: Ion Carrera MD;  Location: [facility] ENDOSCOPY;  Service: Gastroenterology;  Laterality: N/A;  COLON POLYP  POOR BOWEL PREP    COLONOSCOPY N/A 10/16/2023    Procedure: COLONOSCOPY;  Surgeon: Ion Carrera MD;  Location: [facility] ENDOSCOPY;  Service: Gastroenterology;  Laterality: N/A;  NORMAL COLONOSCOPY    CYST REMOVAL      Neck    LUNG BIOPSY Right       Social History     Socioeconomic History    Marital status: Single   Tobacco Use    Smoking status: Never     Passive exposure: Past    Smokeless tobacco: Never   Vaping Use    Vaping status: Never Used   Substance and Sexual Activity    Alcohol use: Not Currently    Drug use: Never    Sexual activity: Defer        Current Outpatient Medications:     albuterol sulfate  (90 Base) MCG/ACT inhaler, Inhale 2 puffs Every 4 (Four) Hours As Needed for Wheezing., Disp: , Rfl:     atorvastatin (LIPITOR) 20 MG tablet, Take 1 tablet by mouth Daily., Disp: 30 tablet, Rfl: 5    fluticasone-salmeterol (Advair HFA) 230-21 MCG/ACT inhaler, Inhale 2 puffs 2 (Two) Times a Day., Disp: 1 each, Rfl: 11    montelukast (SINGULAIR) 10 MG tablet, Take 1 tablet by mouth Every Night., Disp: 30 tablet, Rfl: 11    PreviDent 5000 Booster Plus 1.1 % paste, , Disp: , Rfl:     tamsulosin (FLOMAX) 0.4 MG capsule 24 hr capsule, Take 2 capsules by  mouth Daily for 360 days., Disp: 180 capsule, Rfl: 3    cyclobenzaprine (FLEXERIL) 5 MG tablet, Take 1 tablet by mouth 3 (Three) Times a Day As Needed for Muscle Spasms., Disp: 90 tablet, Rfl: 0   Family History   Problem Relation Age of Onset    Stroke Mother     Lymphoma Father     Cancer Father     Heart disease Brother     Heart attack Brother     Stroke Brother     Diabetes Brother     Cancer Brother     Kidney disease Brother     Colon cancer Neg Hx           Vital Signs:   Vitals:    05/16/24 1009   BP: 110/60   Pulse: 91   Temp: 97.5 °F (36.4 °C)   SpO2: 96%   Weight: 81.7 kg (180 lb 3.2 oz)       Review of Systems   Constitutional:  Negative for fatigue, fever and weight loss.   HENT:  Negative for sore throat.    Eyes:  Negative for visual disturbance.   Respiratory:  Negative for cough, chest tightness, shortness of breath and wheezing.    Cardiovascular:  Negative for chest pain, palpitations and leg swelling.   Gastrointestinal:  Negative for abdominal pain, bowel incontinence, diarrhea, nausea and vomiting.   Genitourinary:  Negative for bladder incontinence, dysuria and pelvic pain.   Musculoskeletal:  Positive for back pain. Negative for myalgias.   Skin:  Negative for rash.   Neurological:  Negative for dizziness, tingling, focal weakness, weakness, light-headedness, numbness, headaches and paresthesias.      Physical Exam  Vitals reviewed.   Constitutional:       Appearance: Normal appearance. He is well-developed.   HENT:      Head: Normocephalic and atraumatic.      Right Ear: External ear normal.      Left Ear: External ear normal.      Mouth/Throat:      Pharynx: No oropharyngeal exudate.   Eyes:      Conjunctiva/sclera: Conjunctivae normal.      Pupils: Pupils are equal, round, and reactive to light.   Cardiovascular:      Rate and Rhythm: Normal rate and regular rhythm.      Pulses: Normal pulses.      Heart sounds: Normal heart sounds. No murmur heard.     No friction rub. No gallop.    Pulmonary:      Effort: Pulmonary effort is normal.      Breath sounds: Normal breath sounds. No wheezing or rhonchi.   Abdominal:      General: Abdomen is flat. Bowel sounds are normal. There is no distension.      Palpations: Abdomen is soft. There is no mass.      Tenderness: There is no abdominal tenderness. There is no guarding or rebound.      Hernia: No hernia is present.   Musculoskeletal:         General: Normal range of motion.      Comments: Bilateral lower back paraspinal muscle tenderness, neg straight leg raise, no vertebrae tenderness, no redness, warmth, swelling, or bruising.   Skin:     General: Skin is warm and dry.      Capillary Refill: Capillary refill takes less than 2 seconds.   Neurological:      General: No focal deficit present.      Mental Status: He is alert and oriented to person, place, and time.      Cranial Nerves: No cranial nerve deficit.   Psychiatric:         Mood and Affect: Mood and affect normal.         Behavior: Behavior normal.         Thought Content: Thought content normal.         Judgment: Judgment normal.        Result Review :   The following data was reviewed by: Keenan Craft MD on 05/16/2024:  CMP          7/26/2023    18:40 10/6/2023    13:19 2/7/2024    09:23   CMP   Glucose  98  129    BUN  12  16    Creatinine 0.90  0.84  0.71    EGFR  96.8  101.8    Sodium  141  139    Potassium  4.2  4.4    Chloride  104  103    Calcium  9.3  9.1    Total Protein  6.9  6.2    Albumin  4.3  4.2    Globulin  2.6  2.0    Total Bilirubin  0.6  0.5    Alkaline Phosphatase  77  72    AST (SGOT)  19  18    ALT (SGPT)  19  28    Albumin/Globulin Ratio  1.7  2.1    BUN/Creatinine Ratio  14.3  22.5    Anion Gap  11.5  7.7      CBC          1/5/2024    10:42 2/1/2024    11:06 2/7/2024    09:23   CBC   WBC 4.90  5.17  5.32    RBC 4.71  4.70  4.65    Hemoglobin 14.2  14.7  14.8    Hematocrit 42.6  43.3  43.6    MCV 90.4  92.1  93.8    MCH 30.1  31.3  31.8    MCHC 33.3  33.9  33.9     RDW 12.6  13.0  13.4    Platelets 118  120  120      Lipid Panel          10/6/2023    13:19   Lipid Panel   Total Cholesterol 124    Triglycerides 85    HDL Cholesterol 38    VLDL Cholesterol 17    LDL Cholesterol  69    LDL/HDL Ratio 1.82          PSA          7/25/2023    09:12 1/11/2024    09:35 2/1/2024    11:06   PSA   PSA 7.000  9.620  7.400      Data reviewed : Radiologic studies I viewed and interpreted 2 views lumbar spine x-rays:no fxs.           Assessment and Plan    Diagnoses and all orders for this visit:    1. Chronic bilateral low back pain without sciatica (Primary)  -     XR Spine Lumbar 2 or 3 View (In Office)  -     Ambulatory Referral to Physical Therapy  -     cyclobenzaprine (FLEXERIL) 5 MG tablet; Take 1 tablet by mouth 3 (Three) Times a Day As Needed for Muscle Spasms.  Dispense: 90 tablet; Refill: 0    2. Mixed hyperlipidemia  -     atorvastatin (LIPITOR) 20 MG tablet; Take 1 tablet by mouth Daily.  Dispense: 30 tablet; Refill: 5  -     Comprehensive Metabolic Panel  -     Lipid Panel    3. Elevated random blood glucose level  -     Hemoglobin A1c            Follow Up   Return in about 21 weeks (around 10/10/2024), or if symptoms worsen or fail to improve, for Recheck, Medicare Wellness.  Patient was given instructions and counseling regarding his condition or for health maintenance advice. Please see specific information pulled into the AVS if appropriate.

## 2024-05-16 NOTE — TELEPHONE ENCOUNTER
Caller: Andry Harper    Relationship: Self    Best call back number: [phone]     What is the best time to reach you: ANYTIME    Who are you requesting to speak with (clinical staff, provider,  specific staff member): CLINICAL     What was the call regarding: PHYSICAL THERAPY  APPOINTMENT     Is it okay if the provider responds through MyChart: NO, CALL PREFERRED MAY LEAVE VOICEMAIL

## 2024-05-17 NOTE — PROGRESS NOTES
Labs show no significant changes except for prediabetes.  Watch diet and exercise for prediabetes.  Follow-up if you have any questions.

## 2024-06-14 ENCOUNTER — TREATMENT (OUTPATIENT)
Dept: PHYSICAL THERAPY | Facility: CLINIC | Age: 64
End: 2024-06-14
Payer: MEDICARE

## 2024-06-14 DIAGNOSIS — Z74.09 STIFFNESS DUE TO IMMOBILITY: ICD-10-CM

## 2024-06-14 DIAGNOSIS — M25.60 STIFFNESS DUE TO IMMOBILITY: ICD-10-CM

## 2024-06-14 DIAGNOSIS — M54.50 CHRONIC BILATERAL LOW BACK PAIN WITHOUT SCIATICA: Primary | ICD-10-CM

## 2024-06-14 DIAGNOSIS — G89.29 CHRONIC BILATERAL LOW BACK PAIN WITHOUT SCIATICA: Primary | ICD-10-CM

## 2024-06-14 NOTE — PROGRESS NOTES
"  Physical Therapy Initial Evaluation and Plan of Care                       Patient: Andry Harper   : 1960  Diagnosis/ICD-10 Code:  Chronic bilateral low back pain without sciatica [M54.50, G89.29]  Referring practitioner: Keenan Craft MD  Date of Initial Visit: 2024  Today's Date: 2024  Patient seen for 1 sessions           Subjective Questionnaire: Oswestry: 8      Subjective Evaluation    History of Present Illness  Mechanism of injury: Patient reports difficulty with bending forward and states he feels very stiff. He reports it has been going on for \"some years\" but has worsened since he retired from being a  at [employer]. Patient reports intermittent pain when he bends forward described as and ache but states he is more stiff than painful.     Pain  Current pain ratin  At worst pain ratin  Quality: dull ache, discomfort and tight  Relieving factors: change in position  Aggravating factors: lifting, squatting and movement  Progression: worsening    Patient Goals  Patient goals for therapy: increased strength, independence with ADLs/IADLs and increased motion             Objective          Active Range of Motion     Lumbar   Flexion: 45 (%) degrees   Extension: 45 (%) degrees   Left lateral flexion: 35 (%) degrees   Right lateral flexion: 35 (%) degrees     Strength/Myotome Testing     Left Hip   Planes of Motion   Flexion: 5  Abduction: 4  Adduction: 4    Right Hip   Planes of Motion   Flexion: 5  Abduction: 4  Adduction: 4    Left Knee   Flexion: 5  Extension: 5    Right Knee   Flexion: 5  Extension: 5    Left Ankle/Foot   Dorsiflexion: 5    Right Ankle/Foot   Dorsiflexion: 5      See Exercise, Manual, and Modality Logs for complete treatment.     Assessment & Plan       Assessment  Impairments: abnormal gait, abnormal muscle firing, abnormal muscle tone, abnormal or restricted ROM, activity intolerance, impaired balance, impaired physical strength, lacks " appropriate home exercise program, pain with function and safety issue   Functional limitations: carrying objects, lifting, sleeping, walking, pulling, pushing, uncomfortable because of pain, moving in bed, sitting, standing, stooping and unable to perform repetitive tasks   Assessment details: The patient presents to physical therapy with complaints of low back pain. Signs and symptoms are consistent with hypomobility of lumbar spine secondary to OA. He would benefit from skilled physical therapy as described below to address these limitations and allow the patient to return to his prior level of function.   Prognosis: good    Goals  Plan Goals: LOW BACK PROBLEMS:    1. The patient complains of low back pain.  LTG 1: 8 weeks:  The patient will report a pain rating of 1 or better in order to improve  tolerance to activities of daily living and improve sleep quality.  STATUS:  New  STG 1a: 4 weeks:  The patient will report a pain rating of 3 or better.  STATUS:  New    2. The patient demonstrates weakness of the  hip.  LTG 2: 8 weeks:  The patient will demonstrate 5 /5 strength for  hip flexion, abduction,  and extension in order to improve hip stability.  STATUS:  New  STG 2a: 4 weeks:  The patient will demonstrate 4 /5 strength for  hip flexion, abduction,  and extension.  STATUS:  New    3. The patient has limited lumbar AROM  LTG 4: 8 weeks:  The patient will demonstrate lumbar AROM as follows: 90% of flexion and 90% of extension.  STATUS:  New  STG 4a: 4 weeks: The patient will demonstrate lumbar AROM as follows: 80% of flexion and 80% of extension.  STATUS:  New    4. Mobility: Walking/Moving Around Functional Limitation    LTG 4: 8 weeks:  The patient will demonstrate improved function by achieving a score of 5 on the PHUONG.  STATUS:  New  STG 4 a: 4 weeks:  The patient will demonstrate improved function by achieving a score of 10 on the PHUONG.    STATUS:  New     Plan  Therapy options: will be seen for skilled  therapy services  Planned modality interventions: cryotherapy, electrical stimulation/Russian stimulation, TENS, dry needling and traction  Planned therapy interventions: balance/weight-bearing training, ADL retraining, soft tissue mobilization, strengthening, stretching, therapeutic activities, joint mobilization, home exercise program, functional ROM exercises, flexibility, body mechanics training, postural training, neuromuscular re-education, manual therapy, abdominal trunk stabilization, IADL retraining and spinal/joint mobilization  Frequency: 2x week  Duration in weeks: 12  Treatment plan discussed with: patient        Visit Diagnoses:    ICD-10-CM ICD-9-CM   1. Chronic bilateral low back pain without sciatica  M54.50 724.2    G89.29 338.29   2. Stiffness due to immobility  M25.60 728.3    Z74.09        History # of Personal Factors and/or Comorbidities: LOW (0)  Examination of Body System(s): # of elements: LOW (1-2)  Clinical Presentation: STABLE   Clinical Decision Making: LOW       Timed:         Manual Therapy:    0     mins  79650;     Therapeutic Exercise:    15     mins  28689;     Neuromuscular Nikolay:    0    mins  63307;    Therapeutic Activity:     15     mins  36266;     Gait Trainin     mins  66084;     Ultrasound:     0     mins  96746;    Ionto                               0    mins   78663  Self Care                       0     mins   06575  Canalith Repos    0     mins 29477      Un-Timed:  Electrical Stimulation:    0     mins  37932 ( );  Dry Needling     0     mins self-pay  Traction     0     mins 33836  Low Eval     16     Mins  73333  Mod Eval     0     Mins  91385  High Eval                       0     Mins  36996  Re-Eval                           0    mins  54213    Timed Treatment:   46   mins   Total Treatment:     46   mins    PT SIGNATURE: Jhonathan Arango PT    Electronically signed 2024    [place] License: PT - [id number]      Initial Certification  Certification  Period: 6/14/2024 thru 9/11/2024  I certify that the therapy services are furnished while this patient is under my care.  The services outlined above are required by this patient, and will be reviewed every 90 days.     PHYSICIAN: Keenan Craft MD  NPI: [id number]      DATE:     Please sign and return via fax to [phone]. Thank you, [facility].

## 2024-06-21 ENCOUNTER — TREATMENT (OUTPATIENT)
Dept: PHYSICAL THERAPY | Facility: CLINIC | Age: 64
End: 2024-06-21
Payer: MEDICARE

## 2024-06-21 DIAGNOSIS — Z74.09 STIFFNESS DUE TO IMMOBILITY: ICD-10-CM

## 2024-06-21 DIAGNOSIS — G89.29 CHRONIC BILATERAL LOW BACK PAIN WITHOUT SCIATICA: Primary | ICD-10-CM

## 2024-06-21 DIAGNOSIS — M54.50 CHRONIC BILATERAL LOW BACK PAIN WITHOUT SCIATICA: Primary | ICD-10-CM

## 2024-06-21 DIAGNOSIS — M25.60 STIFFNESS DUE TO IMMOBILITY: ICD-10-CM

## 2024-06-21 NOTE — PROGRESS NOTES
Physical Therapy Daily Treatment Note                          Patient: Andry Harper   : 1960  Diagnosis/ICD-10 Code:  Chronic bilateral low back pain without sciatica [M54.50, G89.29]  Referring practitioner: Keenan Craft MD  Date of Initial Visit: Type: THERAPY  Noted: 2024  Today's Date: 2024  Patient seen for 2 sessions           Subjective   The patient reported no issues with HEP and that he did them once a day.     Objective   See Exercise, Manual, and Modality Logs for complete treatment.     Assessment/Plan     Patient tolerated initial programming well. Demo and cues required throughout for proper liam and performance of mobility and stretching. No increased pain reported, further skilled care required to improve mobility and strength for increased ease of yard work and ADLs.     Timed:  Manual Therapy:    0     mins  32852;  Therapeutic Exercise:    17     mins  82546;     Neuromuscular Nikolay:   0    mins  80118;    Therapeutic Activity:     12     mins  39365;     Gait Trainin     mins  51201;     Aquatics                         0      mins  74832    Un-timed:  Mechanical Traction      0     mins  91877  Dry Needling     0     mins self-pay  Electrical Stimulation:    0     mins  09116 ( );      Timed Treatment:   29   mins   Total Treatment:     29   mins    Jhonathan Arango PT  Physical Therapist    Electronically signed 2024    [place] License: PT - [id number]

## 2024-07-01 ENCOUNTER — TELEPHONE (OUTPATIENT)
Dept: UROLOGY | Facility: CLINIC | Age: 64
End: 2024-07-01
Payer: MEDICARE

## 2024-07-01 DIAGNOSIS — R97.20 ELEVATED PROSTATE SPECIFIC ANTIGEN (PSA): Primary | ICD-10-CM

## 2024-07-01 DIAGNOSIS — N40.1 BPH WITH OBSTRUCTION/LOWER URINARY TRACT SYMPTOMS: ICD-10-CM

## 2024-07-01 DIAGNOSIS — N13.8 BPH WITH OBSTRUCTION/LOWER URINARY TRACT SYMPTOMS: ICD-10-CM

## 2024-07-01 NOTE — TELEPHONE ENCOUNTER
Caller: Leroy Andry Guerra    Relationship: Self    Best call back number: [phone]    What orders are you requesting (i.e. lab or imaging): PSA    In what timeframe would the patient need to come in: 1 WEEK PRIOR    Where will you receive your lab/imaging services: [facility]     Additional notes: PT IS SCHEDULED FOR FOLLOW UP APPOINTMENT ON 7/22/24 WITH PSA PRIOR. NO ACTIVE ORDERS IN CHART. PT IS AWARE TO COMPLETE PSA PRIOR. OFFICE PLEASE ADVISE.THANK YOU.

## 2024-07-05 ENCOUNTER — TREATMENT (OUTPATIENT)
Dept: PHYSICAL THERAPY | Facility: CLINIC | Age: 64
End: 2024-07-05
Payer: MEDICARE

## 2024-07-05 DIAGNOSIS — M25.60 STIFFNESS DUE TO IMMOBILITY: ICD-10-CM

## 2024-07-05 DIAGNOSIS — M54.50 CHRONIC BILATERAL LOW BACK PAIN WITHOUT SCIATICA: Primary | ICD-10-CM

## 2024-07-05 DIAGNOSIS — Z74.09 STIFFNESS DUE TO IMMOBILITY: ICD-10-CM

## 2024-07-05 DIAGNOSIS — G89.29 CHRONIC BILATERAL LOW BACK PAIN WITHOUT SCIATICA: Primary | ICD-10-CM

## 2024-07-05 NOTE — PROGRESS NOTES
"   Physical Therapy Daily Treatment Note                          Patient: Andry Harper   : 1960  Diagnosis/ICD-10 Code:  Chronic bilateral low back pain without sciatica [M54.50, G89.29]  Referring practitioner: Keenan Craft MD  Date of Initial Visit: Type: THERAPY  Noted: 2024  Today's Date: 2024  Patient seen for 3 sessions           Subjective   The patient reported he has not being doing his HEP as much as he \"probably should\" no complaints of pain in treatment areas reported.     Objective   See Exercise, Manual, and Modality Logs for complete treatment.     Assessment/Plan     Patient tolerated today's txt session well.Cuing required for proper form of LTR as to not excessively rotate through lumbar spine. Plan to DC next visit.     Timed:  Manual Therapy:    0     mins  06572;  Therapeutic Exercise:    17     mins  63440;     Neuromuscular Nikolay:   0    mins  67078;    Therapeutic Activity:     13     mins  36944;     Gait Trainin     mins  39094;     Aquatics                         0      mins  43635    Un-timed:  Mechanical Traction      0     mins  45583  Dry Needling     0     mins self-pay  Electrical Stimulation:    0     mins  56602 ( );      Timed Treatment:   30   mins   Total Treatment:     30   mins    Jhonathan Arango PT  Physical Therapist    Electronically signed 2024    [place] License: PT - [id number]                      "

## 2024-07-12 ENCOUNTER — TREATMENT (OUTPATIENT)
Dept: PHYSICAL THERAPY | Facility: CLINIC | Age: 64
End: 2024-07-12
Payer: MEDICARE

## 2024-07-12 DIAGNOSIS — M54.50 CHRONIC BILATERAL LOW BACK PAIN WITHOUT SCIATICA: Primary | ICD-10-CM

## 2024-07-12 DIAGNOSIS — Z74.09 STIFFNESS DUE TO IMMOBILITY: ICD-10-CM

## 2024-07-12 DIAGNOSIS — G89.29 CHRONIC BILATERAL LOW BACK PAIN WITHOUT SCIATICA: Primary | ICD-10-CM

## 2024-07-12 DIAGNOSIS — M25.60 STIFFNESS DUE TO IMMOBILITY: ICD-10-CM

## 2024-07-12 NOTE — PROGRESS NOTES
Progress Note      Patient: Andry Harper   : 1960  Diagnosis/ICD-10 Code:  Chronic bilateral low back pain without sciatica [M54.50, G89.29]  Referring practitioner: Keenan Craft MD  Date of Initial Visit: Type: THERAPY  Noted: 2024  Today's Date: 2024  Patient seen for 4 sessions      Subjective:   Subjective Questionnaire: Oswestry: 2  Clinical Progress: improved  Home Program Compliance: Yes  Treatment has included: therapeutic exercise and therapeutic activity    Subjective   Patient states he feel he can bend over and pick things up easier and now he is moving easier.   Objective          Active Range of Motion     Additional Active Range of Motion Details  Lumbar AROM  Flexion: 90%  Extension: 90%  Right Side Bendin%  Left Side Bendin%  Right Rotation:90%  Left Rotation: 90%         Strength/Myotome Testing     Left Hip   Planes of Motion   Flexion: 5  Abduction: 5  Adduction: 5    Right Hip   Planes of Motion   Flexion: 5  Abduction: 5  Adduction: 5    Left Knee   Flexion: 5  Extension: 5    Right Knee   Flexion: 5  Extension: 5    Left Ankle/Foot   Dorsiflexion: 5    Right Ankle/Foot   Dorsiflexion: 5      See Exercise, Manual, and Modality Logs for complete treatment.     Assessment/Plan  Patient has met therapy goals and is appropriate to discharge to home program at this time.       Progress toward previous goals: All Met   1. The patient complains of low back pain.  LTG 1: 8 weeks:  The patient will report a pain rating of 1 or better in order to improve  tolerance to activities of daily living and improve sleep quality.  STATUS:  met  STG 1a: 4 weeks:  The patient will report a pain rating of 3 or better.  STATUS:  met    2. The patient demonstrates weakness of the  hip.  LTG 2: 8 weeks:  The patient will demonstrate 5 /5 strength for  hip flexion, abduction,  and extension in order to improve hip stability.  STATUS:  met  STG 2a: 4 weeks:  The patient will  demonstrate 4 /5 strength for  hip flexion, abduction,  and extension.  STATUS:  met    3. The patient has limited lumbar AROM  LTG 4: 8 weeks:  The patient will demonstrate lumbar AROM as follows: 90% of flexion and 90% of extension.  STATUS: met  STG 4a: 4 weeks: The patient will demonstrate lumbar AROM as follows: 80% of flexion and 80% of extension.  STATUS:  met    4. Mobility: Walking/Moving Around Functional Limitation    LTG 4: 8 weeks:  The patient will demonstrate improved function by achieving a score of 5 on the PHUONG.  STATUS:  met  STG 4 a: 4 weeks:  The patient will demonstrate improved function by achieving a score of 10 on the PHUONG.    STATUS:  met      Recommendations: Discharge    Prognosis : good    PT Signature: Jhonathan Arango PT    Electronically signed 2024    [place] License: PT - [id number]       Timed:  Manual Therapy:    0     mins  03825;  Therapeutic Exercise:    18     mins  83020;     Neuromuscular Nikolay:    0    mins  03147;    Therapeutic Activity:     10     mins  57864;     Gait Trainin     mins  35212;     Aquatics                         0      mins  57101    Un-timed:  Mechanical Traction      0     mins  74747  Dry Needling     0     mins self-pay  Electrical Stimulation:    0     mins  01126 ( );    Timed Treatment:   28   mins   Total Treatment:     28   mins

## 2024-07-15 ENCOUNTER — LAB (OUTPATIENT)
Dept: LAB | Facility: HOSPITAL | Age: 64
End: 2024-07-15
Payer: MEDICARE

## 2024-07-15 DIAGNOSIS — R97.20 ELEVATED PROSTATE SPECIFIC ANTIGEN (PSA): ICD-10-CM

## 2024-07-15 DIAGNOSIS — N13.8 BPH WITH OBSTRUCTION/LOWER URINARY TRACT SYMPTOMS: ICD-10-CM

## 2024-07-15 DIAGNOSIS — N40.1 BPH WITH OBSTRUCTION/LOWER URINARY TRACT SYMPTOMS: ICD-10-CM

## 2024-07-15 LAB — PSA SERPL-MCNC: 9.51 NG/ML (ref 0–4)

## 2024-07-15 PROCEDURE — 36415 COLL VENOUS BLD VENIPUNCTURE: CPT

## 2024-07-15 PROCEDURE — 84153 ASSAY OF PSA TOTAL: CPT

## 2024-07-22 ENCOUNTER — OFFICE VISIT (OUTPATIENT)
Dept: UROLOGY | Facility: CLINIC | Age: 64
End: 2024-07-22
Payer: MEDICARE

## 2024-07-22 VITALS
WEIGHT: 184 LBS | HEIGHT: 71 IN | DIASTOLIC BLOOD PRESSURE: 68 MMHG | BODY MASS INDEX: 25.76 KG/M2 | HEART RATE: 63 BPM | SYSTOLIC BLOOD PRESSURE: 120 MMHG

## 2024-07-22 DIAGNOSIS — R97.20 ELEVATED PROSTATE SPECIFIC ANTIGEN (PSA): Primary | ICD-10-CM

## 2024-07-22 DIAGNOSIS — N40.1 BPH WITH OBSTRUCTION/LOWER URINARY TRACT SYMPTOMS: ICD-10-CM

## 2024-07-22 DIAGNOSIS — N13.8 BPH WITH OBSTRUCTION/LOWER URINARY TRACT SYMPTOMS: ICD-10-CM

## 2024-07-22 LAB
BILIRUB BLD-MCNC: NEGATIVE MG/DL
CLARITY, POC: CLEAR
COLOR UR: YELLOW
EXPIRATION DATE: ABNORMAL
GLUCOSE UR STRIP-MCNC: NEGATIVE MG/DL
KETONES UR QL: NEGATIVE
LEUKOCYTE EST, POC: NEGATIVE
Lab: ABNORMAL
NITRITE UR-MCNC: NEGATIVE MG/ML
PH UR: 7 [PH] (ref 5–8)
PROT UR STRIP-MCNC: NEGATIVE MG/DL
RBC # UR STRIP: ABNORMAL /UL
SP GR UR: 1.01 (ref 1–1.03)
UROBILINOGEN UR QL: ABNORMAL

## 2024-07-22 PROCEDURE — 81003 URINALYSIS AUTO W/O SCOPE: CPT | Performed by: UROLOGY

## 2024-07-22 PROCEDURE — 1160F RVW MEDS BY RX/DR IN RCRD: CPT | Performed by: UROLOGY

## 2024-07-22 PROCEDURE — 1159F MED LIST DOCD IN RCRD: CPT | Performed by: UROLOGY

## 2024-07-22 PROCEDURE — 99214 OFFICE O/P EST MOD 30 MIN: CPT | Performed by: UROLOGY

## 2024-07-22 RX ORDER — FINASTERIDE 5 MG/1
5 TABLET, FILM COATED ORAL DAILY
Qty: 90 TABLET | Refills: 3 | Status: SHIPPED | OUTPATIENT
Start: 2024-07-22 | End: 2025-07-17

## 2024-07-22 RX ORDER — BUDESONIDE AND FORMOTEROL FUMARATE DIHYDRATE 160; 4.5 UG/1; UG/1
2 AEROSOL RESPIRATORY (INHALATION)
COMMUNITY
Start: 2024-06-25

## 2024-07-22 NOTE — PROGRESS NOTES
Chief Complaint  Follow-up (Elevated PSA)    Subjective          Andry Harper presents to [facility]    History of Present Illness  History of Present Illness  6/4/19 - Patient presents in follow up. He is doing well. He denies bothersome urinary issues. He has not had a recent PSA.     7/22/19 - Patient presents in follow up. He is doing well. He has no urinary issues. He had an MRI of the prostate and this showed no areas suggestive of clinically significant prostate cancer. The volume of the gland was 77ml. The patient did have a recent PSA and this was 12. This was increased from 6 at the time of his biopsy. I have recommended following this.     12/18/2019: Patient is here for follow-up of his elevated PSA.  It was recently checked and is found to be 7.5 at this point.  That is down from 12.  It was at 6 at the time of his prostate biopsy which was negative.  He is also had an MRI of his prostate which showed no areas suggestive of clinically significant prostate cancer.     6/10/20:  He is having more issues with frequency and urgency.  He states he has a slow stream at times.  He would like to try medicine for that.  His most recent PSA was 8 which is down from a high of 12 but up from last check at 6.  He had a negative prostate MRI but that was over a year ago.       12/9/20:  His most recent PSA in Nov 2020 is now 7.71 which is down for him.  He had a negative prostate MRI in 2019 and again in June 2020.  He had a negative prostate biopsy in the past when his PSA was 6.  It has been as high as 12 in the past.     6/22/21:  Most recent PSA is now 10 in June 2021.  This is similar to his higher PSAs in the past.  His last prostate MRI was in June 2020 and was negative.  He has had two negative prostate MRIs.  He has also had a negative prostate biopsy in the past.      12/20/21: Most recent PSA from 12/13/2021 was 9.120, which is actually down a little bit from when he was seen  "in 06/2021, but is similar to his baseline, which he normally runs between 7 and 9. He inquires about making any changes. The patient reports occasional nocturia. He denies any changes in his flow of urine. He reports the amount he urinates varies. The patient inquires about the Flomax, changing doses and his PSA count. He notes decreased times of urination at night.     6/20/22:    Mr. Harpre, 1960, is a follow-up for elevated PSA and BPH. His most recent PSA from 02/01/2022 was 9.2 ng/mL, which is around his baseline. He has had 2 negative prostate MRIs and a negative prostate biopsy as well.     12/19/22:    He reports that he is doing well. His PSA has been about the same the past 3 times it was checked.     7/28/23:  His most recent PSA is down again, now 7.  It was 8 in Dec 2022. He had a prostate MRI that showed evidence of chronic prostatitis but no evidence of prostate cancer.     1/22/24: His most recent PSA is now 9.  Prostate MRI done in July 2023 was negative with no evidence of prostate cancer.  He does need refills of his Flomax.    7/22/24:  His most recent PSA is now 9.  It was 9 last year as well.        Objective   Vital Signs:   /68   Pulse 63   Ht 180.3 cm (70.98\")   Wt 83.5 kg (184 lb)   BMI 25.67 kg/m²       Physical Exam  Vitals and nursing note reviewed.   Constitutional:       Appearance: Normal appearance. He is well-developed.   Pulmonary:      Effort: Pulmonary effort is normal.      Breath sounds: Normal air entry.   Neurological:      Mental Status: He is alert and oriented to person, place, and time.      Motor: Motor function is intact.   Psychiatric:         Mood and Affect: Mood normal.         Behavior: Behavior normal.        Result Review :   The following data was reviewed by: Michelle Hightower MD on 07/22/2024:    Results for orders placed or performed in visit on 07/22/24   POC Urinalysis Dipstick, Automated    Specimen: Urine   Result Value Ref Range    Color " Yellow Yellow, Straw, Dark Yellow, Nicole    Clarity, UA Clear Clear    Specific Gravity  1.010 1.005 - 1.030    pH, Urine 7.0 5.0 - 8.0    Leukocytes Negative Negative    Nitrite, UA Negative Negative    Protein, POC Negative Negative mg/dL    Glucose, UA Negative Negative mg/dL    Ketones, UA Negative Negative    Urobilinogen, UA 0.2 E.U./dL Normal, 0.2 E.U./dL    Bilirubin Negative Negative    Blood, UA Trace (A) Negative    Lot Number [id number]     Expiration Date 2,025/6        PSA          1/11/2024    09:35 2/1/2024    11:06 7/15/2024    08:48   PSA   PSA 9.620  7.400  9.510                 Assessment and Plan    Diagnoses and all orders for this visit:    1. Elevated prostate specific antigen (PSA) (Primary)  -     POC Urinalysis Dipstick, Automated    Continue to monitor.  See him back in 1 year with PSA prior.        Follow Up       No follow-ups on file.  Patient was given instructions and counseling regarding his condition or for health maintenance advice. Please see specific information pulled into the AVS if appropriate.

## 2024-10-02 RX ORDER — BUDESONIDE AND FORMOTEROL FUMARATE DIHYDRATE 160; 4.5 UG/1; UG/1
AEROSOL RESPIRATORY (INHALATION)
Qty: 10.2 G | Refills: 5 | Status: SHIPPED | OUTPATIENT
Start: 2024-10-02

## 2024-10-08 ENCOUNTER — TELEPHONE (OUTPATIENT)
Dept: ONCOLOGY | Facility: HOSPITAL | Age: 64
End: 2024-10-08
Payer: MEDICARE

## 2024-10-08 NOTE — TELEPHONE ENCOUNTER
Caller: Andry Harper    Relationship to patient: Self    Best call back number: [phone]    Chief complaint: PATIENT CALLED TO RESCHEDULE APPOINTMENT MISSED    Type of visit: FOLLOW UP 2     Requested date: EARLY MORNING      If rescheduling, when is the original appointment: 9-17-24

## 2024-10-09 DIAGNOSIS — D64.9 ANEMIA, UNSPECIFIED TYPE: Primary | ICD-10-CM

## 2024-10-09 NOTE — TELEPHONE ENCOUNTER
LEFT PATIENT DETAILED VM LETTING HIM KNOW THAT I HAVE ADDED HIM ON TO DR. BATEMAN'S NEXT AVAILABLE APPT ON 12/5/24 AT 2:45PM. PATIENT WILL NEED TO GET LABS A FEW DAYS PRIOR. I REQUESTED PT CALL ME BACK TO LET ME KNOW WHERE HE WOULD LIKE TO GET LABS DRAWN AT. PLEASE WARM TRANSFER TO OFFICE.

## 2024-11-20 ENCOUNTER — OFFICE VISIT (OUTPATIENT)
Dept: PULMONOLOGY | Facility: CLINIC | Age: 64
End: 2024-11-20
Payer: MEDICARE

## 2024-11-20 VITALS
DIASTOLIC BLOOD PRESSURE: 62 MMHG | RESPIRATION RATE: 16 BRPM | OXYGEN SATURATION: 97 % | HEIGHT: 71 IN | TEMPERATURE: 97.8 F | WEIGHT: 185 LBS | HEART RATE: 73 BPM | BODY MASS INDEX: 25.9 KG/M2 | SYSTOLIC BLOOD PRESSURE: 121 MMHG

## 2024-11-20 DIAGNOSIS — R06.09 CHRONIC DYSPNEA: Primary | ICD-10-CM

## 2024-11-20 DIAGNOSIS — J69.1 LIPOID PNEUMONIA: ICD-10-CM

## 2024-11-20 DIAGNOSIS — J47.9 BRONCHIECTASIS WITHOUT ACUTE EXACERBATION: ICD-10-CM

## 2024-11-20 DIAGNOSIS — R05.3 CHRONIC COUGH: ICD-10-CM

## 2024-11-20 RX ORDER — ALBUTEROL SULFATE 90 UG/1
2 INHALANT RESPIRATORY (INHALATION) EVERY 4 HOURS PRN
Qty: 18 G | Refills: 11 | Status: SHIPPED | OUTPATIENT
Start: 2024-11-20

## 2024-11-20 NOTE — PROGRESS NOTES
Primary Care Provider  Keenan Craft MD     Referring Provider  No ref. provider found     Chief Complaint  Follow-up (6 month follow up), Allergic Rhinitis, and Wheezing (Lots of wheezing at night time)    Subjective          History of Presenting Illness  64-year-old male with history of asthma, peripheral eosinophilia, bronchiectasis and history of lipoid pneumonia here for follow-up.  Currently on Symbicort.  He has albuterol as needed however he has not used it in quite some time.  He was worried whether or not it actually works.  I checked the expiration date and his inhaler currently  in .  I am not actually sure if he is even using his inhalers or not at this point as he has a marked history of nonadherence he is using his Acapella device multiple times a day.  He coughs up thick cloudy secretions after using his airway clearance device.  Dyspnea changes last office visit.  He is short of breath walking to 1500 feet.  Dyspnea is mild in severity, worse with activity and relieved with rest.  He does have some nighttime wheezing which more prevalent over the last month.  Denies any feeling of secretions being stuck in his airways. Has stable pulmonary scarring dating back a couple years.  He does have signs and symptoms of seasonal allergies and allergic rhinitis and has Xyzal and Singulair that he takes intermittently. Patient denies fever, chills, night sweats, swollen glands in the head and neck, unintentional weight loss, hemoptysis, purulent sputum production, dysphagia, chest pain, palpitations, chest tightness, abdominal pain, nausea, vomiting, and diarrhea.  Patient also denies any myalgias, changes in sense of taste and/or smell, sore throat, any other coronavirus or flu-like symptoms.  Patient denies any leg swelling, orthopnea, paroxysmal nocturnal dyspnea.  Patient is able to perform activities of daily living.         Family History   Problem Relation Age of Onset    Stroke  Mother     Lymphoma Father     Cancer Father     Heart disease Brother     Heart attack Brother     Stroke Brother     Diabetes Brother     Cancer Brother     Kidney disease Brother     Colon cancer Neg Hx         Social History     Socioeconomic History    Marital status: Single   Tobacco Use    Smoking status: Never     Passive exposure: Past    Smokeless tobacco: Never   Vaping Use    Vaping status: Never Used   Substance and Sexual Activity    Alcohol use: Not Currently    Drug use: Never    Sexual activity: Defer        Past Medical History:   Diagnosis Date    Anemia     AVM (arteriovenous malformation)     cecal AVM    Colon polyp     History of transfusion     Hyperlipidemia     Hypertension     Lung disease         Immunization History   Administered Date(s) Administered    COVID-19 (PFIZER) Purple Cap Monovalent 04/05/2021, 04/26/2021    Flu Vaccine Intradermal Quad 18-64YR 10/07/2020    Flu Vaccine Quad PF >36MO 10/01/2018, 09/30/2019    Fluzone (or Fluarix & Flulaval for VFC) >6mos 11/09/2021, 10/21/2022    Fluzone High-Dose 65+YRS 10/14/2024    Fluzone High-Dose 65+yrs 10/06/2023    Influenza, Unspecified 11/09/2021    Pneumococcal Conjugate 20-Valent (PCV20) 05/07/2024, 07/02/2024    Pneumococcal Polysaccharide (PPSV23) 08/25/2021    Tdap 04/10/2023       Allergies   Allergen Reactions    Penicillins Unknown - Low Severity          Current Outpatient Medications:     albuterol sulfate  (90 Base) MCG/ACT inhaler, Inhale 2 puffs Every 4 (Four) Hours As Needed for Wheezing., Disp: 18 g, Rfl: 11    atorvastatin (LIPITOR) 20 MG tablet, Take 1 tablet by mouth Daily., Disp: 30 tablet, Rfl: 5    budesonide-formoterol (SYMBICORT) 160-4.5 MCG/ACT inhaler, Inhale 2 puffs by mouth twice a day, Disp: 10.2 g, Rfl: 5    finasteride (PROSCAR) 5 MG tablet, Take 1 tablet by mouth Daily for 360 days., Disp: 90 tablet, Rfl: 3    PreviDent 5000 Booster Plus 1.1 % paste, , Disp: , Rfl:     tamsulosin (FLOMAX) 0.4 MG  "capsule 24 hr capsule, Take 2 capsules by mouth Daily for 360 days., Disp: 180 capsule, Rfl: 3    cyclobenzaprine (FLEXERIL) 5 MG tablet, Take 1 tablet by mouth 3 (Three) Times a Day As Needed for Muscle Spasms. (Patient not taking: Reported on 2024), Disp: 90 tablet, Rfl: 0    montelukast (SINGULAIR) 10 MG tablet, Take 1 tablet by mouth Every Night. (Patient not taking: Reported on 2024), Disp: 30 tablet, Rfl: 11     Objective     Physical Exam  Vital Signs:   WDWN, Alert, NAD.    HEENT:  PERRL, EOMI.  OP, nares clear  Chest:  good aeration, scant rhonchi bilaterally, tympanic to percussion bilaterally, no work of breathing noted  CV: RRR, no MGR, pulses 2+, equal.  Abd:  Soft, NT, ND, + BS, no HSM  EXT:  no clubbing, no cyanosis, no edema  Neuro:  A&Ox3, CN grossly intact, no focal deficits.  Skin: No rashes or lesions noted.    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      Chronic dyspnea.      Chronic wheezing.      Chronic cough  Seasonal allergies poorly controlled  Seasonal allergic rhinitis poorly controlled  Inhaler nonadherence  Never smoker.      Plan:  Last chest CT had stable pulmonary scarring.  No further follow-up needed at this time  Continue Symbicort twice a day plus albuterol as needed.  I will refill albuterol today as it is  in  and I am not sure if he is actually using it or not.  Inhaler education discussed with the patient today.  Continue airway clearance with Acapella device  Encouraged him to take his Singulair and " Xyzal  Encourage activity  Vaccination status:  up-to-date with pneumonia and flu vaccines.    Smoking status: never smoker.  Medications reviewed and reconciled today    Follow Up   Return in about 6 months (around 5/20/2025).  Patient was given instructions and counseling regarding his condition or for health maintenance advice. Please see specific information pulled into the AVS if appropriate.     Electronically signed by Pernell Bryant MD, 11/20/24, 8:17 AM EST.

## 2024-11-26 ENCOUNTER — LAB (OUTPATIENT)
Dept: LAB | Facility: HOSPITAL | Age: 64
End: 2024-11-26
Payer: MEDICARE

## 2024-11-26 DIAGNOSIS — D64.9 ANEMIA, UNSPECIFIED TYPE: ICD-10-CM

## 2024-11-26 LAB
ALBUMIN SERPL-MCNC: 4.2 G/DL (ref 3.5–5.2)
ALBUMIN/GLOB SERPL: 1.4 G/DL
ALP SERPL-CCNC: 83 U/L (ref 39–117)
ALT SERPL W P-5'-P-CCNC: 31 U/L (ref 1–41)
ANION GAP SERPL CALCULATED.3IONS-SCNC: 7.1 MMOL/L (ref 5–15)
AST SERPL-CCNC: 21 U/L (ref 1–40)
BASOPHILS # BLD AUTO: 0.03 10*3/MM3 (ref 0–0.2)
BASOPHILS NFR BLD AUTO: 0.5 % (ref 0–1.5)
BILIRUB SERPL-MCNC: 0.8 MG/DL (ref 0–1.2)
BUN SERPL-MCNC: 14 MG/DL (ref 8–23)
BUN/CREAT SERPL: 16.9 (ref 7–25)
CALCIUM SPEC-SCNC: 9.5 MG/DL (ref 8.6–10.5)
CHLORIDE SERPL-SCNC: 104 MMOL/L (ref 98–107)
CO2 SERPL-SCNC: 28.9 MMOL/L (ref 22–29)
CREAT SERPL-MCNC: 0.83 MG/DL (ref 0.76–1.27)
DEPRECATED RDW RBC AUTO: 46.9 FL (ref 37–54)
EGFRCR SERPLBLD CKD-EPI 2021: 96.5 ML/MIN/1.73
EOSINOPHIL # BLD AUTO: 0.18 10*3/MM3 (ref 0–0.4)
EOSINOPHIL NFR BLD AUTO: 3.3 % (ref 0.3–6.2)
ERYTHROCYTE [DISTWIDTH] IN BLOOD BY AUTOMATED COUNT: 13.3 % (ref 12.3–15.4)
FERRITIN SERPL-MCNC: 472.1 NG/ML (ref 30–400)
GLOBULIN UR ELPH-MCNC: 2.9 GM/DL
GLUCOSE SERPL-MCNC: 106 MG/DL (ref 65–99)
HCT VFR BLD AUTO: 46.5 % (ref 37.5–51)
HGB BLD-MCNC: 15.1 G/DL (ref 13–17.7)
IMM GRANULOCYTES # BLD AUTO: 0.03 10*3/MM3 (ref 0–0.05)
IMM GRANULOCYTES NFR BLD AUTO: 0.5 % (ref 0–0.5)
IRON 24H UR-MRATE: 128 MCG/DL (ref 59–158)
IRON SATN MFR SERPL: 35 % (ref 20–50)
LYMPHOCYTES # BLD AUTO: 2.07 10*3/MM3 (ref 0.7–3.1)
LYMPHOCYTES NFR BLD AUTO: 37.8 % (ref 19.6–45.3)
MCH RBC QN AUTO: 31 PG (ref 26.6–33)
MCHC RBC AUTO-ENTMCNC: 32.5 G/DL (ref 31.5–35.7)
MCV RBC AUTO: 95.5 FL (ref 79–97)
MONOCYTES # BLD AUTO: 0.46 10*3/MM3 (ref 0.1–0.9)
MONOCYTES NFR BLD AUTO: 8.4 % (ref 5–12)
NEUTROPHILS NFR BLD AUTO: 2.7 10*3/MM3 (ref 1.7–7)
NEUTROPHILS NFR BLD AUTO: 49.5 % (ref 42.7–76)
NRBC BLD AUTO-RTO: 0 /100 WBC (ref 0–0.2)
PLATELET # BLD AUTO: 112 10*3/MM3 (ref 140–450)
PMV BLD AUTO: 11.8 FL (ref 6–12)
POTASSIUM SERPL-SCNC: 4.5 MMOL/L (ref 3.5–5.2)
PROT SERPL-MCNC: 7.1 G/DL (ref 6–8.5)
RBC # BLD AUTO: 4.87 10*6/MM3 (ref 4.14–5.8)
SODIUM SERPL-SCNC: 140 MMOL/L (ref 136–145)
TIBC SERPL-MCNC: 371 MCG/DL (ref 298–536)
TRANSFERRIN SERPL-MCNC: 249 MG/DL (ref 200–360)
WBC NRBC COR # BLD AUTO: 5.47 10*3/MM3 (ref 3.4–10.8)

## 2024-11-26 PROCEDURE — 82728 ASSAY OF FERRITIN: CPT

## 2024-11-26 PROCEDURE — 84466 ASSAY OF TRANSFERRIN: CPT

## 2024-11-26 PROCEDURE — 85025 COMPLETE CBC W/AUTO DIFF WBC: CPT

## 2024-11-26 PROCEDURE — 36415 COLL VENOUS BLD VENIPUNCTURE: CPT

## 2024-11-26 PROCEDURE — 83540 ASSAY OF IRON: CPT

## 2024-11-26 PROCEDURE — 80053 COMPREHEN METABOLIC PANEL: CPT

## 2024-12-05 ENCOUNTER — OFFICE VISIT (OUTPATIENT)
Dept: ONCOLOGY | Facility: HOSPITAL | Age: 64
End: 2024-12-05
Payer: MEDICARE

## 2024-12-05 VITALS
TEMPERATURE: 97.1 F | HEIGHT: 71 IN | HEART RATE: 65 BPM | DIASTOLIC BLOOD PRESSURE: 69 MMHG | SYSTOLIC BLOOD PRESSURE: 129 MMHG | OXYGEN SATURATION: 98 % | WEIGHT: 185.2 LBS | BODY MASS INDEX: 25.93 KG/M2 | RESPIRATION RATE: 18 BRPM

## 2024-12-05 DIAGNOSIS — D69.6 THROMBOCYTOPENIA: ICD-10-CM

## 2024-12-05 DIAGNOSIS — D64.9 ANEMIA, UNSPECIFIED TYPE: Primary | ICD-10-CM

## 2024-12-05 DIAGNOSIS — D50.0 IRON DEFICIENCY ANEMIA DUE TO CHRONIC BLOOD LOSS: ICD-10-CM

## 2024-12-05 PROCEDURE — G0463 HOSPITAL OUTPT CLINIC VISIT: HCPCS | Performed by: INTERNAL MEDICINE

## 2024-12-05 NOTE — PROGRESS NOTES
Chief Complaint/Care Team   Anemia, unspecified type    Provider, No Known  Keenan Craft MD    History of Present Illness     Diagnosis: Iron deficiency anemia (secondary to AVMs seen on colonoscopy from 2018 and cecum)    Mild Thrombocytopenia    Elevation in PSA- underwent MRI prostate on 7/2023 was negative for prostate cancer, monitored by urologist Dr. Hightower    Current Treatment: Active surveillance  Previous Treatment: Patient reports history of receiving PRBC transfusions 3 years ago for anemia    Andry Harper is a 64 y.o. male who presents to [facility] HEMATOLOGY & ONCOLOGY for evaluation of thrombocytopenia and iron deficiency anemia.  Reports history of thrombocytopenia for the past 5 to 6 years, denies any known liver disease, denies any family history of any congenital blood disorders or anemia.  He reports a history of his father with unknown type of lymphoma, history of brother with metastatic kidney cancer.    He denies any blood loss or melena.  Does report history of kinase level and basic labs a few weeks ago was evaluated the ER) number secondary to having difficulty stopping the bleeding.  He reports history of receiving PRBC transfusion 3 years ago.  He is currently not taking any iron tablets and has not received IV iron infusion.    Regarding iron deficiency anemia, history of AVM per colonoscopy from October 2018 which revealed a single medium angioectasia in the cecum, status post APC to the right colon, also had 2 sessile polyps that were removed.    Interval History: Pt here to follow up regarding SHAHEEN and thrombocytopenia, he underwent labs and he is here to discuss these results, pt again without any blood loss, melena, pica, bruising.     Review of Systems   Constitutional:  Negative for appetite change, diaphoresis, fatigue, fever, unexpected weight gain and unexpected weight loss.   HENT:  Negative for hearing loss, mouth sores, sore throat, swollen  "glands, trouble swallowing and voice change.    Eyes:  Negative for blurred vision.   Respiratory:  Negative for cough, shortness of breath and wheezing.    Cardiovascular:  Negative for chest pain and palpitations.   Gastrointestinal:  Negative for abdominal pain, blood in stool, constipation, diarrhea, nausea and vomiting.   Endocrine: Negative for cold intolerance and heat intolerance.   Genitourinary:  Negative for difficulty urinating, dysuria, frequency, hematuria and urinary incontinence.   Musculoskeletal:  Negative for arthralgias, back pain and myalgias.   Skin:  Negative for rash, skin lesions and wound.   Neurological:  Negative for dizziness, seizures, weakness, numbness and headache.   Hematological:  Does not bruise/bleed easily.   Psychiatric/Behavioral:  Negative for depressed mood. The patient is not nervous/anxious.    All other systems reviewed and are negative.       Oncology/Hematology History    No history exists.       Objective     Vitals:    12/05/24 1419   BP: 129/69   Pulse: 65   Resp: 18   Temp: 97.1 °F (36.2 °C)   TempSrc: Temporal   SpO2: 98%   Weight: 84 kg (185 lb 3.2 oz)   Height: 180.3 cm (71\")   PainSc: 0-No pain         ECOG score: 0         PHQ-9 Total Score:         Physical Exam  Vitals reviewed. Exam conducted with a chaperone present.   Constitutional:       General: He is not in acute distress.  HENT:      Head: Normocephalic and atraumatic.   Eyes:      Extraocular Movements: Extraocular movements intact.      Conjunctiva/sclera: Conjunctivae normal.   Pulmonary:      Effort: Pulmonary effort is normal.   Skin:     Findings: No bruising.      Comments: No petechiae visualized   Neurological:      Mental Status: He is alert and oriented to person, place, and time.           Past Medical History     Past Medical History:   Diagnosis Date    Anemia     AVM (arteriovenous malformation)     cecal AVM    Colon polyp     History of transfusion     Hyperlipidemia     Hypertension  "    Lung disease      Current Outpatient Medications on File Prior to Visit   Medication Sig Dispense Refill    albuterol sulfate  (90 Base) MCG/ACT inhaler Inhale 2 puffs Every 4 (Four) Hours As Needed for Wheezing. 18 g 11    atorvastatin (LIPITOR) 20 MG tablet Take 1 tablet by mouth Daily. 30 tablet 5    budesonide-formoterol (SYMBICORT) 160-4.5 MCG/ACT inhaler Inhale 2 puffs by mouth twice a day 10.2 g 5    finasteride (PROSCAR) 5 MG tablet Take 1 tablet by mouth Daily for 360 days. 90 tablet 3    PreviDent 5000 Booster Plus 1.1 % paste       tamsulosin (FLOMAX) 0.4 MG capsule 24 hr capsule Take 2 capsules by mouth Daily for 360 days. 180 capsule 3    cyclobenzaprine (FLEXERIL) 5 MG tablet Take 1 tablet by mouth 3 (Three) Times a Day As Needed for Muscle Spasms. (Patient not taking: Reported on 7/22/2024) 90 tablet 0    montelukast (SINGULAIR) 10 MG tablet Take 1 tablet by mouth Every Night. (Patient not taking: Reported on 12/5/2024) 30 tablet 11     No current facility-administered medications on file prior to visit.      Allergies   Allergen Reactions    Penicillins Unknown - Low Severity     Past Surgical History:   Procedure Laterality Date    COLONOSCOPY  10/26/2018    5 YR RECALL - POLYPS    COLONOSCOPY N/A 8/14/2023    Procedure: COLONOSCOPY WITH POLYPECTOMY/SNARE;  Surgeon: Ion Carrera MD;  Location: [facility] ENDOSCOPY;  Service: Gastroenterology;  Laterality: N/A;  COLON POLYP  POOR BOWEL PREP    COLONOSCOPY N/A 10/16/2023    Procedure: COLONOSCOPY;  Surgeon: Ion Carrera MD;  Location: [facility] ENDOSCOPY;  Service: Gastroenterology;  Laterality: N/A;  NORMAL COLONOSCOPY    CYST REMOVAL      Neck    LUNG BIOPSY Right      Social History     Socioeconomic History    Marital status: Single   Tobacco Use    Smoking status: Never     Passive exposure: Past    Smokeless tobacco: Never   Vaping Use    Vaping status: Never Used   Substance and Sexual Activity    Alcohol use: Not Currently     Drug use: Never    Sexual activity: Defer     Family History   Problem Relation Age of Onset    Stroke Mother     Lymphoma Father     Cancer Father     Heart disease Brother     Heart attack Brother     Stroke Brother     Diabetes Brother     Cancer Brother     Kidney disease Brother     Colon cancer Neg Hx        Results     Result Review   The following data was reviewed by: Jamarcus Villela MD on 02/07/2024:  Lab Results   Component Value Date    HGB 15.1 11/26/2024    HCT 46.5 11/26/2024    MCV 95.5 11/26/2024     (L) 11/26/2024    WBC 5.47 11/26/2024    NEUTROABS 2.70 11/26/2024    LYMPHSABS 2.07 11/26/2024    MONOSABS 0.46 11/26/2024    EOSABS 0.18 11/26/2024    BASOSABS 0.03 11/26/2024     Lab Results   Component Value Date    GLUCOSE 106 (H) 11/26/2024    BUN 14 11/26/2024    CREATININE 0.83 11/26/2024     11/26/2024    K 4.5 11/26/2024     11/26/2024    CO2 28.9 11/26/2024    CALCIUM 9.5 11/26/2024    PROTEINTOT 7.1 11/26/2024    ALBUMIN 4.2 11/26/2024    BILITOT 0.8 11/26/2024    ALKPHOS 83 11/26/2024    AST 21 11/26/2024    ALT 31 11/26/2024     Lab Results   Component Value Date    MG 2.0 07/10/2021           No radiology results for the last day       Assessment & Plan     Diagnoses and all orders for this visit:    1. Anemia, unspecified type (Primary)  -     CBC & Differential; Future  -     Comprehensive Metabolic Panel; Future  -     Ferritin; Future  -     Iron Profile; Future    2. Iron deficiency anemia due to chronic blood loss    3. Thrombocytopenia            Andry Harper is a 64 y.o. male who presents to [facility] HEMATOLOGY & ONCOLOGY for Iron deficiency anemia (secondary to AVMs seen on colonoscopy from 2018 and cecum) and Mild Thrombocytopenia.    Mild thrombocytopenia  - Patient with persistent mild decrease in platelet count ranging between 109-120 for the past 3 to 4 years  -Discussed with patient plan to obtain peripheral blood smear, repeat  CBC, CMP, ultrasound of liver to assess for cause of his thrombocytopenia  -discussed results of US of liver which revealed hepatic steatosis, recommended diet modification and weight loss  -discussed results of peripheral blood smear from 2/9/2024 which revealed thrombocytopenia, normal morphology of plts, otherwise normal WBC and RBCs  -discussed the most recent CBC which showed plt count 112K, stable from 120K  -Given mild nature of thrombocytopenia will continue to observe and perform additional workup if platelet count suddenly drops or develops other cytopenias  -Patient again without any blood loss or melena today      Iron deficiency anemia  -secondary to AVMs seen on colonoscopy from 2018 and cecum  -Most recent hemoglobin normal at 15.1 and pt with normal iron profile, and pt without evidence of iron deficiency.  -again no reported blood loss or melena today  --Recommend continued monitoring via colonoscopy EGD per GI recommendations.  -Continue to monitor for recurrence will repeat iron studies at next clinic appointment.    Plan for patient follow-up in 6 months with repeat labs CBC, CMP, Iron profile and Ferritin.    Please note that portions of this note were completed with a voice recognition program.    Electronically signed by Jamarcus Villela MD, 12/05/24, 3:01 PM EST.      Follow Up     I spent 30 minutes caring for Andry on this date of service. This time includes time spent by me in the following activities:preparing for the visit, reviewing tests, obtaining and/or reviewing a separately obtained history, performing a medically appropriate examination and/or evaluation , counseling and educating the patient/family/caregiver, ordering medications, tests, or procedures, referring and communicating with other health care professionals , documenting information in the medical record, independently interpreting results and communicating that information with the patient/family/caregiver, and care  coordination.    This is an acute or chronic illness that poses a threat to life or bodily function. The above treatment plan involves a high risk of complications and/or mortality of patient management.    The patient was seen and examined. Work by the provider also included review and/or ordering of lab tests, review and/or ordering of radiology tests, review and/or ordering of medicine tests, discussion with other physicians or providers, independent review of data, obtaining old records, review/summation of old records, and/or other review.    I have reviewed the family history, social history, and past medical history for this patient. Previous information and data has been reviewed and updated as needed. I have reviewed and verified the chief complaint, history, and other documentation. The patient was interviewed and examined in the clinic and the chart reviewed. The previous observations, recommendations, and conclusions were reviewed including those of other providers.     The plan was discussed with the patient and/or family. The patient was given time to ask questions and these questions were answered. At the conclusion of their visit they had no additional questions or concerns and all questions were answered to their satisfaction.    Patient was given instructions and counseling regarding his condition or for health maintenance advice. Please see specific information pulled into the AVS if appropriate.

## 2025-01-13 DIAGNOSIS — N13.8 BPH WITH OBSTRUCTION/LOWER URINARY TRACT SYMPTOMS: ICD-10-CM

## 2025-01-13 DIAGNOSIS — N40.1 BPH WITH OBSTRUCTION/LOWER URINARY TRACT SYMPTOMS: ICD-10-CM

## 2025-01-13 RX ORDER — TAMSULOSIN HYDROCHLORIDE 0.4 MG/1
2 CAPSULE ORAL DAILY
Qty: 180 CAPSULE | Refills: 0 | Status: SHIPPED | OUTPATIENT
Start: 2025-01-13 | End: 2026-01-08

## 2025-01-21 ENCOUNTER — LAB (OUTPATIENT)
Dept: LAB | Facility: HOSPITAL | Age: 65
End: 2025-01-21
Payer: MEDICARE

## 2025-01-21 DIAGNOSIS — R97.20 ELEVATED PROSTATE SPECIFIC ANTIGEN (PSA): ICD-10-CM

## 2025-01-21 LAB — PSA SERPL-MCNC: 4.23 NG/ML (ref 0–4)

## 2025-01-21 PROCEDURE — 36415 COLL VENOUS BLD VENIPUNCTURE: CPT

## 2025-01-21 PROCEDURE — 84153 ASSAY OF PSA TOTAL: CPT

## 2025-01-31 ENCOUNTER — OFFICE VISIT (OUTPATIENT)
Dept: UROLOGY | Age: 65
End: 2025-01-31
Payer: MEDICARE

## 2025-01-31 VITALS — HEIGHT: 71 IN | BODY MASS INDEX: 25.9 KG/M2 | WEIGHT: 185 LBS

## 2025-01-31 DIAGNOSIS — N13.8 BPH WITH OBSTRUCTION/LOWER URINARY TRACT SYMPTOMS: ICD-10-CM

## 2025-01-31 DIAGNOSIS — R97.20 ELEVATED PROSTATE SPECIFIC ANTIGEN (PSA): Primary | ICD-10-CM

## 2025-01-31 DIAGNOSIS — N40.1 BPH WITH OBSTRUCTION/LOWER URINARY TRACT SYMPTOMS: ICD-10-CM

## 2025-01-31 LAB
BILIRUB BLD-MCNC: NEGATIVE MG/DL
CLARITY, POC: CLEAR
COLOR UR: YELLOW
EXPIRATION DATE: NORMAL
GLUCOSE UR STRIP-MCNC: NEGATIVE MG/DL
KETONES UR QL: NEGATIVE
LEUKOCYTE EST, POC: NEGATIVE
Lab: NORMAL
NITRITE UR-MCNC: NEGATIVE MG/ML
PH UR: 6 [PH] (ref 5–8)
PROT UR STRIP-MCNC: NEGATIVE MG/DL
RBC # UR STRIP: NEGATIVE /UL
SP GR UR: 1.01 (ref 1–1.03)
UROBILINOGEN UR QL: NORMAL

## 2025-01-31 RX ORDER — FINASTERIDE 5 MG/1
5 TABLET, FILM COATED ORAL DAILY
Qty: 90 TABLET | Refills: 3 | Status: SHIPPED | OUTPATIENT
Start: 2025-01-31 | End: 2026-01-26

## 2025-01-31 RX ORDER — TAMSULOSIN HYDROCHLORIDE 0.4 MG/1
2 CAPSULE ORAL DAILY
Qty: 180 CAPSULE | Refills: 3 | Status: SHIPPED | OUTPATIENT
Start: 2025-01-31 | End: 2026-01-26

## 2025-01-31 NOTE — PROGRESS NOTES
Chief Complaint  Elevated PSA    Subjective          Andry Harper presents to [facility] UROLOGY  History of Present Illness  6/4/19 - Patient presents in follow up. He is doing well. He denies bothersome urinary issues. He has not had a recent PSA.     7/22/19 - Patient presents in follow up. He is doing well. He has no urinary issues. He had an MRI of the prostate and this showed no areas suggestive of clinically significant prostate cancer. The volume of the gland was 77ml. The patient did have a recent PSA and this was 12. This was increased from 6 at the time of his biopsy. I have recommended following this.     12/18/2019: Patient is here for follow-up of his elevated PSA.  It was recently checked and is found to be 7.5 at this point.  That is down from 12.  It was at 6 at the time of his prostate biopsy which was negative.  He is also had an MRI of his prostate which showed no areas suggestive of clinically significant prostate cancer.     6/10/20:  He is having more issues with frequency and urgency.  He states he has a slow stream at times.  He would like to try medicine for that.  His most recent PSA was 8 which is down from a high of 12 but up from last check at 6.  He had a negative prostate MRI but that was over a year ago.       12/9/20:  His most recent PSA in Nov 2020 is now 7.71 which is down for him.  He had a negative prostate MRI in 2019 and again in June 2020.  He had a negative prostate biopsy in the past when his PSA was 6.  It has been as high as 12 in the past.     6/22/21:  Most recent PSA is now 10 in June 2021.  This is similar to his higher PSAs in the past.  His last prostate MRI was in June 2020 and was negative.  He has had two negative prostate MRIs.  He has also had a negative prostate biopsy in the past.      12/20/21: Most recent PSA from 12/13/2021 was 9.120, which is actually down a little bit from when he was seen in 06/2021, but is similar to his baseline,  "which he normally runs between 7 and 9. He inquires about making any changes. The patient reports occasional nocturia. He denies any changes in his flow of urine. He reports the amount he urinates varies. The patient inquires about the Flomax, changing doses and his PSA count. He notes decreased times of urination at night.     6/20/22:    Mr. Harper, 1960, is a follow-up for elevated PSA and BPH. His most recent PSA from 02/01/2022 was 9.2 ng/mL, which is around his baseline. He has had 2 negative prostate MRIs and a negative prostate biopsy as well.     12/19/22:    He reports that he is doing well. His PSA has been about the same the past 3 times it was checked.     7/28/23:  His most recent PSA is down again, now 7.  It was 8 in Dec 2022. He had a prostate MRI that showed evidence of chronic prostatitis but no evidence of prostate cancer.     1/22/24: His most recent PSA is now 9.  Prostate MRI done in July 2023 was negative with no evidence of prostate cancer.  He does need refills of his Flomax.     7/22/24:  His most recent PSA is now 9.  It was 9 last year as well.      1/31/2025: His most recent PSA is now 4.3.  This was down quite a bit from July last year 2024 when it was 9.        Objective   Vital Signs:   Ht 180.3 cm (71\")   Wt 83.9 kg (185 lb)   BMI 25.80 kg/m²       Physical Exam  Vitals and nursing note reviewed.   Constitutional:       Appearance: Normal appearance. He is well-developed.   Pulmonary:      Effort: Pulmonary effort is normal.      Breath sounds: Normal air entry.   Neurological:      Mental Status: He is alert and oriented to person, place, and time.      Motor: Motor function is intact.   Psychiatric:         Mood and Affect: Mood normal.         Behavior: Behavior normal.          Result Review :   The following data was reviewed by: Michelle Hightower MD on 01/31/2025:    Results for orders placed or performed in visit on 01/31/25   POC Urinalysis Dipstick, Automated    " Collection Time: 01/31/25 10:35 AM    Specimen: Urine   Result Value Ref Range    Color Yellow Yellow, Straw, Dark Yellow, Nicole    Clarity, UA Clear Clear    Specific Gravity  1.010 1.005 - 1.030    pH, Urine 6.0 5.0 - 8.0    Leukocytes Negative Negative    Nitrite, UA Negative Negative    Protein, POC Negative Negative mg/dL    Glucose, UA Negative Negative mg/dL    Ketones, UA Negative Negative    Urobilinogen, UA 0.2 E.U./dL Normal, 0.2 E.U./dL    Bilirubin Negative Negative    Blood, UA Negative Negative    Lot Number [id number]     Expiration Date 102,025        PSA          7/15/2024    08:48 1/21/2025    11:40   PSA   PSA 9.510  4.230             Assessment and Plan    Diagnoses and all orders for this visit:    1. Elevated prostate specific antigen (PSA) (Primary)  -     POC Urinalysis Dipstick, Automated  -     PSA DIAGNOSTIC; Future    2. BPH with obstruction/lower urinary tract symptoms  -     finasteride (PROSCAR) 5 MG tablet; Take 1 tablet by mouth Daily for 360 days.  Dispense: 90 tablet; Refill: 3  -     tamsulosin (FLOMAX) 0.4 MG capsule 24 hr capsule; Take 2 capsules by mouth Daily for 360 days.  Dispense: 180 capsule; Refill: 3    I refilled his finasteride and Flomax.  PSA is appropriately down to 4 after starting finasteride.  I will see him back in 1 year with a PSA prior.        Follow Up       No follow-ups on file.  Patient was given instructions and counseling regarding his condition or for health maintenance advice. Please see specific information pulled into the AVS if appropriate.

## 2025-03-03 ENCOUNTER — OFFICE VISIT (OUTPATIENT)
Dept: FAMILY MEDICINE CLINIC | Facility: CLINIC | Age: 65
End: 2025-03-03
Payer: MEDICARE

## 2025-03-03 VITALS
HEART RATE: 100 BPM | HEIGHT: 71 IN | DIASTOLIC BLOOD PRESSURE: 70 MMHG | OXYGEN SATURATION: 98 % | BODY MASS INDEX: 26.1 KG/M2 | WEIGHT: 186.4 LBS | TEMPERATURE: 98.4 F | SYSTOLIC BLOOD PRESSURE: 120 MMHG

## 2025-03-03 DIAGNOSIS — Z00.00 MEDICARE ANNUAL WELLNESS VISIT, SUBSEQUENT: Primary | ICD-10-CM

## 2025-03-03 DIAGNOSIS — R73.03 PREDIABETES: ICD-10-CM

## 2025-03-03 DIAGNOSIS — E78.2 MIXED HYPERLIPIDEMIA: ICD-10-CM

## 2025-03-03 DIAGNOSIS — D69.6 THROMBOCYTOPENIA: ICD-10-CM

## 2025-03-03 LAB
ALBUMIN SERPL-MCNC: 4 G/DL (ref 3.5–5.2)
ALBUMIN/GLOB SERPL: 1.5 G/DL
ALP SERPL-CCNC: 77 U/L (ref 39–117)
ALT SERPL W P-5'-P-CCNC: 38 U/L (ref 1–41)
ANION GAP SERPL CALCULATED.3IONS-SCNC: 10.2 MMOL/L (ref 5–15)
AST SERPL-CCNC: 26 U/L (ref 1–40)
BASOPHILS # BLD AUTO: 0.02 10*3/MM3 (ref 0–0.2)
BASOPHILS NFR BLD AUTO: 0.4 % (ref 0–1.5)
BILIRUB SERPL-MCNC: 0.7 MG/DL (ref 0–1.2)
BUN SERPL-MCNC: 13 MG/DL (ref 8–23)
BUN/CREAT SERPL: 14.9 (ref 7–25)
CALCIUM SPEC-SCNC: 9.1 MG/DL (ref 8.6–10.5)
CHLORIDE SERPL-SCNC: 104 MMOL/L (ref 98–107)
CHOLEST SERPL-MCNC: 129 MG/DL (ref 0–200)
CO2 SERPL-SCNC: 25.8 MMOL/L (ref 22–29)
CREAT SERPL-MCNC: 0.87 MG/DL (ref 0.76–1.27)
DEPRECATED RDW RBC AUTO: 43 FL (ref 37–54)
EGFRCR SERPLBLD CKD-EPI 2021: 95.2 ML/MIN/1.73
EOSINOPHIL # BLD AUTO: 0.14 10*3/MM3 (ref 0–0.4)
EOSINOPHIL NFR BLD AUTO: 2.6 % (ref 0.3–6.2)
ERYTHROCYTE [DISTWIDTH] IN BLOOD BY AUTOMATED COUNT: 13 % (ref 12.3–15.4)
GLOBULIN UR ELPH-MCNC: 2.6 GM/DL
GLUCOSE SERPL-MCNC: 105 MG/DL (ref 65–99)
HBA1C MFR BLD: 6.1 % (ref 4.8–5.6)
HCT VFR BLD AUTO: 45.5 % (ref 37.5–51)
HDLC SERPL-MCNC: 32 MG/DL (ref 40–60)
HGB BLD-MCNC: 16.1 G/DL (ref 13–17.7)
IMM GRANULOCYTES # BLD AUTO: 0.02 10*3/MM3 (ref 0–0.05)
IMM GRANULOCYTES NFR BLD AUTO: 0.4 % (ref 0–0.5)
LDLC SERPL CALC-MCNC: 77 MG/DL (ref 0–100)
LDLC/HDLC SERPL: 2.34 {RATIO}
LYMPHOCYTES # BLD AUTO: 2.13 10*3/MM3 (ref 0.7–3.1)
LYMPHOCYTES NFR BLD AUTO: 39.4 % (ref 19.6–45.3)
MCH RBC QN AUTO: 32.5 PG (ref 26.6–33)
MCHC RBC AUTO-ENTMCNC: 35.4 G/DL (ref 31.5–35.7)
MCV RBC AUTO: 91.7 FL (ref 79–97)
MONOCYTES # BLD AUTO: 0.39 10*3/MM3 (ref 0.1–0.9)
MONOCYTES NFR BLD AUTO: 7.2 % (ref 5–12)
NEUTROPHILS NFR BLD AUTO: 2.71 10*3/MM3 (ref 1.7–7)
NEUTROPHILS NFR BLD AUTO: 50 % (ref 42.7–76)
PLATELET # BLD AUTO: 126 10*3/MM3 (ref 140–450)
PMV BLD AUTO: 11.2 FL (ref 6–12)
POTASSIUM SERPL-SCNC: 4.5 MMOL/L (ref 3.5–5.2)
PROT SERPL-MCNC: 6.6 G/DL (ref 6–8.5)
RBC # BLD AUTO: 4.96 10*6/MM3 (ref 4.14–5.8)
SODIUM SERPL-SCNC: 140 MMOL/L (ref 136–145)
TRIGL SERPL-MCNC: 110 MG/DL (ref 0–150)
VLDLC SERPL-MCNC: 20 MG/DL (ref 5–40)
WBC NRBC COR # BLD AUTO: 5.41 10*3/MM3 (ref 3.4–10.8)

## 2025-03-03 PROCEDURE — 85025 COMPLETE CBC W/AUTO DIFF WBC: CPT | Performed by: FAMILY MEDICINE

## 2025-03-03 PROCEDURE — 1160F RVW MEDS BY RX/DR IN RCRD: CPT | Performed by: FAMILY MEDICINE

## 2025-03-03 PROCEDURE — 1126F AMNT PAIN NOTED NONE PRSNT: CPT | Performed by: FAMILY MEDICINE

## 2025-03-03 PROCEDURE — 83036 HEMOGLOBIN GLYCOSYLATED A1C: CPT | Performed by: FAMILY MEDICINE

## 2025-03-03 PROCEDURE — 80053 COMPREHEN METABOLIC PANEL: CPT | Performed by: FAMILY MEDICINE

## 2025-03-03 PROCEDURE — 1159F MED LIST DOCD IN RCRD: CPT | Performed by: FAMILY MEDICINE

## 2025-03-03 PROCEDURE — 80061 LIPID PANEL: CPT | Performed by: FAMILY MEDICINE

## 2025-03-03 PROCEDURE — 1170F FXNL STATUS ASSESSED: CPT | Performed by: FAMILY MEDICINE

## 2025-03-03 PROCEDURE — G0439 PPPS, SUBSEQ VISIT: HCPCS | Performed by: FAMILY MEDICINE

## 2025-03-03 RX ORDER — ATORVASTATIN CALCIUM 20 MG/1
20 TABLET, FILM COATED ORAL DAILY
Qty: 30 TABLET | Refills: 5 | Status: SHIPPED | OUTPATIENT
Start: 2025-03-03

## 2025-03-03 NOTE — ASSESSMENT & PLAN NOTE
Lipid abnormalities are stable    Plan:  Continue same medication/s without change.      Discussed medication dosage, use, side effects, and goals of treatment in detail.    Counseled patient on lifestyle modifications to help control hyperlipidemia.     Patient Treatment Goals:   LDL goal is less than 70    Followup in 6 months.    Orders:    atorvastatin (LIPITOR) 20 MG tablet; Take 1 tablet by mouth Daily.    Comprehensive Metabolic Panel    Lipid Panel

## 2025-03-03 NOTE — PROGRESS NOTES
Venipuncture Blood Specimen Collection  Venipuncture performed in [place] by [facility] Chanel with good hemostasis. Patient tolerated the procedure well without complications.   03/03/25   Staci Mckinnon MA

## 2025-03-03 NOTE — PROGRESS NOTES
Subjective   The ABCs of the Annual Wellness Visit  Medicare Wellness Visit      Andry Harper is a 64 y.o. patient who presents for a Medicare Wellness Visit.    The following portions of the patient's history were reviewed and   updated as appropriate: He  has a past medical history of Anemia, AVM (arteriovenous malformation), Colon polyp, History of transfusion, Hyperlipidemia, Hypertension, and Lung disease.  He does not have any pertinent problems on file.  He  has a past surgical history that includes Lung biopsy (Right); Colonoscopy (10/26/2018); Cyst Removal; Colonoscopy (N/A, 8/14/2023); and Colonoscopy (N/A, 10/16/2023).  His family history includes Cancer in his brother and father; Diabetes in his brother; Heart attack in his brother; Heart disease in his brother; Kidney disease in his brother; Lymphoma in his father; Stroke in his brother and mother.  He  reports that he has never smoked. He has been exposed to tobacco smoke. He has never used smokeless tobacco. He reports that he does not currently use alcohol. He reports that he does not use drugs.  Current Outpatient Medications   Medication Sig Dispense Refill    albuterol sulfate  (90 Base) MCG/ACT inhaler Inhale 2 puffs Every 4 (Four) Hours As Needed for Wheezing. 18 g 11    atorvastatin (LIPITOR) 20 MG tablet Take 1 tablet by mouth Daily. 30 tablet 5    budesonide-formoterol (SYMBICORT) 160-4.5 MCG/ACT inhaler Inhale 2 puffs by mouth twice a day 10.2 g 5    cyclobenzaprine (FLEXERIL) 5 MG tablet Take 1 tablet by mouth 3 (Three) Times a Day As Needed for Muscle Spasms. 90 tablet 0    finasteride (PROSCAR) 5 MG tablet Take 1 tablet by mouth Daily for 360 days. 90 tablet 3    montelukast (SINGULAIR) 10 MG tablet Take 1 tablet by mouth Every Night. 30 tablet 11    PreviDent 5000 Booster Plus 1.1 % paste       tamsulosin (FLOMAX) 0.4 MG capsule 24 hr capsule Take 2 capsules by mouth Daily for 360 days. 180 capsule 3     No current  facility-administered medications for this visit.     Current Outpatient Medications on File Prior to Visit   Medication Sig    albuterol sulfate  (90 Base) MCG/ACT inhaler Inhale 2 puffs Every 4 (Four) Hours As Needed for Wheezing.    budesonide-formoterol (SYMBICORT) 160-4.5 MCG/ACT inhaler Inhale 2 puffs by mouth twice a day    cyclobenzaprine (FLEXERIL) 5 MG tablet Take 1 tablet by mouth 3 (Three) Times a Day As Needed for Muscle Spasms.    finasteride (PROSCAR) 5 MG tablet Take 1 tablet by mouth Daily for 360 days.    montelukast (SINGULAIR) 10 MG tablet Take 1 tablet by mouth Every Night.    PreviDent 5000 Booster Plus 1.1 % paste     tamsulosin (FLOMAX) 0.4 MG capsule 24 hr capsule Take 2 capsules by mouth Daily for 360 days.    [DISCONTINUED] atorvastatin (LIPITOR) 20 MG tablet Take 1 tablet by mouth Daily.     No current facility-administered medications on file prior to visit.     He is allergic to penicillins..    Compared to one year ago, the patient's physical   health is the same.  Compared to one year ago, the patient's mental   health is the same.    Recent Hospitalizations:  He was not admitted to the hospital during the last year.     Current Medical Providers:  Patient Care Team:  Keenan Craft MD as PCP - General (Family Medicine)  Dalia Rogers APRN as Nurse Practitioner (Nurse Practitioner)  Michelle Hightower MD as Consulting Physician (Urology)  Pernell Bryant MD as Consulting Physician (Pulmonary Disease)    Outpatient Medications Prior to Visit   Medication Sig Dispense Refill    albuterol sulfate  (90 Base) MCG/ACT inhaler Inhale 2 puffs Every 4 (Four) Hours As Needed for Wheezing. 18 g 11    budesonide-formoterol (SYMBICORT) 160-4.5 MCG/ACT inhaler Inhale 2 puffs by mouth twice a day 10.2 g 5    cyclobenzaprine (FLEXERIL) 5 MG tablet Take 1 tablet by mouth 3 (Three) Times a Day As Needed for Muscle Spasms. 90 tablet 0    finasteride (PROSCAR) 5 MG tablet  "Take 1 tablet by mouth Daily for 360 days. 90 tablet 3    montelukast (SINGULAIR) 10 MG tablet Take 1 tablet by mouth Every Night. 30 tablet 11    PreviDent 5000 Booster Plus 1.1 % paste       tamsulosin (FLOMAX) 0.4 MG capsule 24 hr capsule Take 2 capsules by mouth Daily for 360 days. 180 capsule 3    atorvastatin (LIPITOR) 20 MG tablet Take 1 tablet by mouth Daily. 30 tablet 5     No facility-administered medications prior to visit.     No opioid medication identified on active medication list. I have reviewed chart for other potential  high risk medication/s and harmful drug interactions in the elderly.      Aspirin is not on active medication list.  Aspirin use is not indicated based on review of current medical condition/s. Risk of harm outweighs potential benefits.  .    Patient Active Problem List   Diagnosis    Chronic eosinophilic pneumonia    Lipoid pneumonia    Chronic dyspnea    Wheezing    Thrombocytopenia    Dyspnea    Cough    Intermittent chest pain    Iron deficiency anemia    Elevated prostate specific antigen (PSA)    Allergic rhinitis    Anemia    BPH without urinary obstruction    Hypercholesteremia    Hyperlipemia    Pneumonia    BPH with obstruction/lower urinary tract symptoms    History of colon polyps    Seasonal allergies    Abnormal CT scan, chest    Bronchiolitis     Advance Care Planning Advance Directive is not on file.  ACP discussion was held with the patient during this visit. Patient has an advance directive (not in EMR), copy requested.            Objective   Vitals:    03/03/25 1020   BP: 120/70   Pulse: 100   Temp: 98.4 °F (36.9 °C)   SpO2: 98%   Weight: 84.6 kg (186 lb 6.4 oz)   Height: 180.3 cm (71\")   PainSc: 0-No pain       Estimated body mass index is 26 kg/m² as calculated from the following:    Height as of this encounter: 180.3 cm (71\").    Weight as of this encounter: 84.6 kg (186 lb 6.4 oz).                Does the patient have evidence of cognitive impairment? No      "                                                                                           Health  Risk Assessment    Smoking Status:  Social History     Tobacco Use   Smoking Status Never    Passive exposure: Past   Smokeless Tobacco Never     Alcohol Consumption:  Social History     Substance and Sexual Activity   Alcohol Use Not Currently       Fall Risk Screen  STEADI Fall Risk Assessment was completed, and patient is at LOW risk for falls.Assessment completed on:3/3/2025    Depression Screening   Little interest or pleasure in doing things? Not at all   Feeling down, depressed, or hopeless? Not at all   PHQ-2 Total Score 0      Health Habits and Functional and Cognitive Screening:      3/3/2025    10:00 AM   Functional & Cognitive Status   Do you have difficulty preparing food and eating? No   Do you have difficulty bathing yourself, getting dressed or grooming yourself? No   Do you have difficulty using the toilet? No   Do you have difficulty moving around from place to place? No   Do you have trouble with steps or getting out of a bed or a chair? No   Current Diet Well Balanced Diet   Dental Exam Up to date   Eye Exam Not up to date   Exercise (times per week) 0 times per week   Current Exercises Include No Regular Exercise   Do you need help using the phone?  No   Are you deaf or do you have serious difficulty hearing?  No   Do you need help to go to places out of walking distance? No   Do you need help shopping? No   Do you need help preparing meals?  No   Do you need help with housework?  No   Do you need help with laundry? No   Do you need help taking your medications? No   Do you need help managing money? No   Do you ever drive or ride in a car without wearing a seat belt? No   Have you felt unusual stress, anger or loneliness in the last month? No   Who do you live with? Alone   If you need help, do you have trouble finding someone available to you? No   Have you been bothered in the last four weeks by  sexual problems? No   Do you have difficulty concentrating, remembering or making decisions? No           Age-appropriate Screening Schedule:  Refer to the list below for future screening recommendations based on patient's age, sex and/or medical conditions. Orders for these recommended tests are listed in the plan section. The patient has been provided with a written plan.    Health Maintenance List  Health Maintenance   Topic Date Due    ZOSTER VACCINE (1 of 2) Never done    COVID-19 Vaccine (3 - 2024-25 season) 03/05/2025 (Originally 9/1/2024)    LIPID PANEL  05/16/2025    BMI FOLLOWUP  05/16/2025    ANNUAL WELLNESS VISIT  03/03/2026    COLORECTAL CANCER SCREENING  10/16/2028    TDAP/TD VACCINES (2 - Td or Tdap) 04/10/2033    HEPATITIS C SCREENING  Completed    INFLUENZA VACCINE  Completed    Pneumococcal Vaccine 50+  Completed                                                                                                                                                CMS Preventative Services Quick Reference  Risk Factors Identified During Encounter  Immunizations Discussed/Encouraged: Shingrix  Inactivity/Sedentary: Patient was advised to exercise at least 150 minutes a week per CDC recommendations.    The above risks/problems have been discussed with the patient.  Pertinent information has been shared with the patient in the After Visit Summary.  An After Visit Summary and PPPS were made available to the patient.    Follow Up:   Next Medicare Wellness visit to be scheduled in 1 year.         Additional E&M Note during same encounter follows:  Patient has additional, significant, and separately identifiable condition(s)/problem(s) that require work above and beyond the Medicare Wellness Visit     Chief Complaint  Medicare Wellness-subsequent, Hyperlipidemia, and Anemia    Subjective   Hyperlipidemia  Pertinent negatives include no chest pain or shortness of breath.   Anemia  There has been no abdominal pain,  "fever, light-headedness or palpitations.         Review of Systems   Constitutional:  Negative for fatigue and fever.   HENT:  Negative for sore throat.    Eyes:  Negative for visual disturbance.   Respiratory:  Negative for apnea, cough, shortness of breath and wheezing.    Cardiovascular:  Negative for chest pain, palpitations and leg swelling.   Gastrointestinal:  Negative for abdominal pain, diarrhea, nausea and vomiting.   Neurological:  Negative for dizziness and light-headedness.              Objective   Vital Signs:  /70   Pulse 100   Temp 98.4 °F (36.9 °C)   Ht 180.3 cm (71\")   Wt 84.6 kg (186 lb 6.4 oz)   SpO2 98%   BMI 26.00 kg/m²   Physical Exam  Vitals reviewed.   Constitutional:       Appearance: Normal appearance. He is well-developed.   HENT:      Head: Normocephalic and atraumatic.      Right Ear: External ear normal.      Left Ear: External ear normal.      Mouth/Throat:      Pharynx: No oropharyngeal exudate.   Eyes:      Conjunctiva/sclera: Conjunctivae normal.      Pupils: Pupils are equal, round, and reactive to light.   Cardiovascular:      Rate and Rhythm: Normal rate and regular rhythm.      Pulses: Normal pulses.      Heart sounds: Normal heart sounds. No murmur heard.     No friction rub. No gallop.   Pulmonary:      Effort: Pulmonary effort is normal.      Breath sounds: Normal breath sounds. No wheezing or rhonchi.   Abdominal:      General: Abdomen is flat. Bowel sounds are normal. There is no distension.      Palpations: Abdomen is soft. There is no mass.      Tenderness: There is no abdominal tenderness. There is no guarding or rebound.      Hernia: No hernia is present.   Musculoskeletal:         General: Normal range of motion.   Skin:     General: Skin is warm and dry.      Capillary Refill: Capillary refill takes less than 2 seconds.   Neurological:      General: No focal deficit present.      Mental Status: He is alert and oriented to person, place, and time.      " Cranial Nerves: No cranial nerve deficit.   Psychiatric:         Mood and Affect: Mood and affect normal.         Behavior: Behavior normal.         Thought Content: Thought content normal.         Judgment: Judgment normal.         The following data was reviewed by: Keenan Craft MD on 03/03/2025:    CMP          5/16/2024    10:34 11/26/2024    09:09   CMP   Glucose 110  106    BUN 15  14    Creatinine 0.78  0.83    EGFR 98.4  96.5    Sodium 142  140    Potassium 4.8  4.5    Chloride 105  104    Calcium 9.4  9.5    Total Protein 6.9  7.1    Albumin 4.5  4.2    Globulin 2.4  2.9    Total Bilirubin 0.7  0.8    Alkaline Phosphatase 88  83    AST (SGOT) 20  21    ALT (SGPT) 29  31    Albumin/Globulin Ratio 1.9  1.4    BUN/Creatinine Ratio 19.2  16.9    Anion Gap 11.0  7.1      CBC          11/26/2024    09:09   CBC   WBC 5.47    RBC 4.87    Hemoglobin 15.1    Hematocrit 46.5    MCV 95.5    MCH 31.0    MCHC 32.5    RDW 13.3    Platelets 112      Lipid Panel          5/16/2024    10:34   Lipid Panel   Total Cholesterol 129    Triglycerides 94    HDL Cholesterol 37    VLDL Cholesterol 18    LDL Cholesterol  74    LDL/HDL Ratio 1.98        Most Recent A1C          5/16/2024    10:34   HGBA1C Most Recent   Hemoglobin A1C 6.00      PSA          7/15/2024    08:48 1/21/2025    11:40   PSA   PSA 9.510  4.230              Assessment and Plan Additional age appropriate preventative wellness advice topics were discussed during today's preventative wellness exam(some topics already addressed during AWV portion of the note above):   Nutrition: Discussed nutrition plan with patient. Information shared in after visit summary. Goal is for a well balanced diet to enhance overall health.           Mixed hyperlipidemia   Lipid abnormalities are stable    Plan:  Continue same medication/s without change.      Discussed medication dosage, use, side effects, and goals of treatment in detail.    Counseled patient on lifestyle  modifications to help control hyperlipidemia.     Patient Treatment Goals:   LDL goal is less than 70    Followup in 6 months.    Orders:    atorvastatin (LIPITOR) 20 MG tablet; Take 1 tablet by mouth Daily.    Comprehensive Metabolic Panel    Lipid Panel    Prediabetes    Orders:    Hemoglobin A1c    Thrombocytopenia    Orders:    CBC Auto Differential    Medicare annual wellness visit, subsequent                 Follow Up   Return in about 6 months (around 9/3/2025) for Recheck.  Patient was given instructions and counseling regarding his condition or for health maintenance advice. Please see specific information pulled into the AVS if appropriate.

## 2025-04-25 ENCOUNTER — EXTERNAL PBMM DATA (OUTPATIENT)
Dept: PHARMACY | Facility: OTHER | Age: 65
End: 2025-04-25
Payer: MEDICARE

## 2025-05-14 ENCOUNTER — OFFICE VISIT (OUTPATIENT)
Dept: PULMONOLOGY | Facility: CLINIC | Age: 65
End: 2025-05-14
Payer: MEDICARE

## 2025-05-14 VITALS
WEIGHT: 188.4 LBS | RESPIRATION RATE: 16 BRPM | BODY MASS INDEX: 26.38 KG/M2 | SYSTOLIC BLOOD PRESSURE: 133 MMHG | HEART RATE: 82 BPM | OXYGEN SATURATION: 98 % | DIASTOLIC BLOOD PRESSURE: 80 MMHG | HEIGHT: 71 IN

## 2025-05-14 DIAGNOSIS — R06.89 AIRWAY CLEARANCE IMPAIRMENT: ICD-10-CM

## 2025-05-14 DIAGNOSIS — R06.2 WHEEZING: ICD-10-CM

## 2025-05-14 DIAGNOSIS — J47.9 BRONCHIECTASIS WITHOUT ACUTE EXACERBATION: ICD-10-CM

## 2025-05-14 DIAGNOSIS — J69.1 LIPOID PNEUMONIA: ICD-10-CM

## 2025-05-14 DIAGNOSIS — R93.89 ABNORMAL CHEST CT: ICD-10-CM

## 2025-05-14 DIAGNOSIS — J82.81 CHRONIC EOSINOPHILIC PNEUMONIA: ICD-10-CM

## 2025-05-14 DIAGNOSIS — R05.3 CHRONIC COUGH: ICD-10-CM

## 2025-05-14 DIAGNOSIS — J30.2 SEASONAL ALLERGIES: ICD-10-CM

## 2025-05-14 DIAGNOSIS — R06.09 CHRONIC DYSPNEA: Primary | ICD-10-CM

## 2025-05-14 PROCEDURE — 1160F RVW MEDS BY RX/DR IN RCRD: CPT | Performed by: INTERNAL MEDICINE

## 2025-05-14 PROCEDURE — 1159F MED LIST DOCD IN RCRD: CPT | Performed by: INTERNAL MEDICINE

## 2025-05-14 PROCEDURE — 99214 OFFICE O/P EST MOD 30 MIN: CPT | Performed by: INTERNAL MEDICINE

## 2025-05-14 NOTE — PROGRESS NOTES
Primary Care Provider  Keenan Craft MD     Referring Provider  No ref. provider found     Chief Complaint  Allergic Rhinitis, Shortness of Breath (With exertion and when laying on back ), and Follow-up (6 Month )    Subjective          History of Presenting Illness  65-year-old male with history of asthma, peripheral eosinophilia, bronchiectasis and history of lipoid pneumonia here for follow-up.  Uses Symbicort twice a day.  Uses albuterol maybe once or twice a week.  He is doing well with his airway clearance with Acapella device.  Dyspnea and cough are at baseline.  He can get short of breath walking up 2 flights of steps.  Dyspnea is moderate severity, worse with activity and relieved with rest.  Has occasional wheezing at night with laying flat.  Cough in the mornings productive of thick clear secretions but overall better.    Has stable pulmonary scarring on chest CT dating back a couple years.  He does have signs and symptoms of seasonal allergies and allergic rhinitis and has Xyzal and Singulair that he takes intermittently. Patient denies fever, chills, night sweats, swollen glands in the head and neck, unintentional weight loss, hemoptysis, purulent sputum production, dysphagia, chest pain, palpitations, chest tightness, abdominal pain, nausea, vomiting, and diarrhea.  Patient also denies any myalgias, changes in sense of taste and/or smell, sore throat, any other coronavirus or flu-like symptoms.  Patient denies any leg swelling, orthopnea, paroxysmal nocturnal dyspnea.  Patient is able to perform activities of daily living.         Family History   Problem Relation Age of Onset    Stroke Mother     Lymphoma Father     Cancer Father     Heart disease Brother     Heart attack Brother     Stroke Brother     Diabetes Brother     Cancer Brother     Kidney disease Brother     Colon cancer Neg Hx         Social History     Socioeconomic History    Marital status: Single   Tobacco Use    Smoking status:  Never     Passive exposure: Past    Smokeless tobacco: Never   Vaping Use    Vaping status: Never Used   Substance and Sexual Activity    Alcohol use: Not Currently    Drug use: Never    Sexual activity: Defer        Past Medical History:   Diagnosis Date    Anemia     AVM (arteriovenous malformation)     cecal AVM    Colon polyp     History of transfusion     Hyperlipidemia     Hypertension     Lung disease         Immunization History   Administered Date(s) Administered    COVID-19 (PFIZER) Purple Cap Monovalent 04/05/2021, 04/26/2021    Flu Vaccine Intradermal Quad 18-64YR 10/07/2020    Flu Vaccine Quad PF >36MO 10/01/2018, 09/30/2019    Fluzone (or Fluarix & Flulaval for VFC) >6mos 11/09/2021, 10/21/2022    Fluzone High-Dose 65+YRS 10/14/2024    Fluzone High-Dose 65+yrs 10/06/2023    Influenza, Unspecified 11/09/2021    Pneumococcal Conjugate 20-Valent (PCV20) 05/07/2024, 07/02/2024    Pneumococcal Polysaccharide (PPSV23) 08/25/2021    Tdap 04/10/2023       Allergies   Allergen Reactions    Penicillins Unknown - Low Severity          Current Outpatient Medications:     albuterol sulfate  (90 Base) MCG/ACT inhaler, Inhale 2 puffs Every 4 (Four) Hours As Needed for Wheezing., Disp: 18 g, Rfl: 11    atorvastatin (LIPITOR) 20 MG tablet, Take 1 tablet by mouth Daily., Disp: 30 tablet, Rfl: 5    budesonide-formoterol (SYMBICORT) 160-4.5 MCG/ACT inhaler, Inhale 2 puffs by mouth twice a day, Disp: 10.2 g, Rfl: 5    montelukast (SINGULAIR) 10 MG tablet, Take 1 tablet by mouth Every Night., Disp: 30 tablet, Rfl: 11    PreviDent 5000 Booster Plus 1.1 % paste, , Disp: , Rfl:     tamsulosin (FLOMAX) 0.4 MG capsule 24 hr capsule, Take 2 capsules by mouth Daily for 360 days., Disp: 180 capsule, Rfl: 3    finasteride (PROSCAR) 5 MG tablet, Take 1 tablet by mouth Daily for 360 days. (Patient not taking: Reported on 5/14/2025), Disp: 90 tablet, Rfl: 3     Objective     Physical Exam  Vital Signs:   WDWN, Alert, NAD.   "  HEENT:  PERRL, EOMI.  OP, nares clear  Chest:  good aeration, clear to auscultation bilaterally, tympanic to percussion bilaterally, no work of breathing noted  CV: RRR, no MGR, pulses 2+, equal.  Abd:  Soft, NT, ND, + BS, no HSM  EXT:  no clubbing, no cyanosis, no edema  Neuro:  A&Ox3, CN grossly intact, no focal deficits.  Skin: No rashes or lesions noted.    /80 (BP Location: Left arm, Patient Position: Sitting, Cuff Size: Adult)   Pulse 82   Resp 16   Ht 180.3 cm (71\")   Wt 85.5 kg (188 lb 6.4 oz)   SpO2 98% Comment: Room air  BMI 26.28 kg/m²         Result Review :   Personally reviewed my last office note.  2025 CBC with no peripheral eosinophilia and CMP with no evidence of chronic hypercapnia.      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      Chronic dyspnea.      Chronic wheezing.      Chronic cough  Seasonal allergies poorly controlled  Seasonal allergic rhinitis poorly controlled  Inhaler nonadherence  Never smoker.  Plan:  Last chest CT in 2024 had stable pulmonary scarring dating back a few years.  No further follow-up needed at this time  Continue Symbicort twice a day plus albuterol as needed.   Continue airway clearance with Acapella device  Continue Singulair and Xyzal  Encourage activity  Vaccination status:  up-to-date with pneumonia and flu vaccines.    Smoking status: never smoker.  Medications reviewed and reconciled today    Follow Up   Return in about 6 months (around 11/14/2025).  Patient was given instructions and counseling regarding his condition or for health maintenance advice. Please see specific information pulled into the AVS if appropriate.     Electronically signed by Pernell Bryant MD, 05/14/25, 8:23 AM EDT.        " Primary Defect Length In Cm (Final Defect Size - Required For Flaps/Grafts): 1.5

## 2025-05-27 ENCOUNTER — LAB (OUTPATIENT)
Dept: LAB | Facility: HOSPITAL | Age: 65
End: 2025-05-27
Payer: MEDICARE

## 2025-05-27 DIAGNOSIS — D64.9 ANEMIA, UNSPECIFIED TYPE: ICD-10-CM

## 2025-05-27 LAB
ALBUMIN SERPL-MCNC: 4.3 G/DL (ref 3.5–5.2)
ALBUMIN/GLOB SERPL: 1.5 G/DL
ALP SERPL-CCNC: 90 U/L (ref 39–117)
ALT SERPL W P-5'-P-CCNC: 29 U/L (ref 1–41)
ANION GAP SERPL CALCULATED.3IONS-SCNC: 12.5 MMOL/L (ref 5–15)
AST SERPL-CCNC: 22 U/L (ref 1–40)
BASOPHILS # BLD AUTO: 0.03 10*3/MM3 (ref 0–0.2)
BASOPHILS NFR BLD AUTO: 0.5 % (ref 0–1.5)
BILIRUB SERPL-MCNC: 0.8 MG/DL (ref 0–1.2)
BUN SERPL-MCNC: 13.8 MG/DL (ref 8–23)
BUN/CREAT SERPL: 15.7 (ref 7–25)
CALCIUM SPEC-SCNC: 9.2 MG/DL (ref 8.6–10.5)
CHLORIDE SERPL-SCNC: 103 MMOL/L (ref 98–107)
CO2 SERPL-SCNC: 24.5 MMOL/L (ref 22–29)
CREAT SERPL-MCNC: 0.88 MG/DL (ref 0.76–1.27)
DEPRECATED RDW RBC AUTO: 44.6 FL (ref 37–54)
EGFRCR SERPLBLD CKD-EPI 2021: 94.2 ML/MIN/1.73
EOSINOPHIL # BLD AUTO: 0.12 10*3/MM3 (ref 0–0.4)
EOSINOPHIL NFR BLD AUTO: 1.9 % (ref 0.3–6.2)
ERYTHROCYTE [DISTWIDTH] IN BLOOD BY AUTOMATED COUNT: 13 % (ref 12.3–15.4)
FERRITIN SERPL-MCNC: 491.2 NG/ML (ref 30–400)
GLOBULIN UR ELPH-MCNC: 2.8 GM/DL
GLUCOSE SERPL-MCNC: 111 MG/DL (ref 65–99)
HCT VFR BLD AUTO: 46.6 % (ref 37.5–51)
HGB BLD-MCNC: 15.7 G/DL (ref 13–17.7)
IMM GRANULOCYTES # BLD AUTO: 0.04 10*3/MM3 (ref 0–0.05)
IMM GRANULOCYTES NFR BLD AUTO: 0.6 % (ref 0–0.5)
IRON 24H UR-MRATE: 100 MCG/DL (ref 59–158)
IRON SATN MFR SERPL: 27 % (ref 20–50)
LYMPHOCYTES # BLD AUTO: 2.41 10*3/MM3 (ref 0.7–3.1)
LYMPHOCYTES NFR BLD AUTO: 38.7 % (ref 19.6–45.3)
MCH RBC QN AUTO: 31.8 PG (ref 26.6–33)
MCHC RBC AUTO-ENTMCNC: 33.7 G/DL (ref 31.5–35.7)
MCV RBC AUTO: 94.3 FL (ref 79–97)
MONOCYTES # BLD AUTO: 0.46 10*3/MM3 (ref 0.1–0.9)
MONOCYTES NFR BLD AUTO: 7.4 % (ref 5–12)
NEUTROPHILS NFR BLD AUTO: 3.17 10*3/MM3 (ref 1.7–7)
NEUTROPHILS NFR BLD AUTO: 50.9 % (ref 42.7–76)
NRBC BLD AUTO-RTO: 0 /100 WBC (ref 0–0.2)
PLATELET # BLD AUTO: 116 10*3/MM3 (ref 140–450)
PMV BLD AUTO: 11.9 FL (ref 6–12)
POTASSIUM SERPL-SCNC: 4.3 MMOL/L (ref 3.5–5.2)
PROT SERPL-MCNC: 7.1 G/DL (ref 6–8.5)
RBC # BLD AUTO: 4.94 10*6/MM3 (ref 4.14–5.8)
SODIUM SERPL-SCNC: 140 MMOL/L (ref 136–145)
TIBC SERPL-MCNC: 367 MCG/DL (ref 298–536)
TRANSFERRIN SERPL-MCNC: 246 MG/DL (ref 200–360)
WBC NRBC COR # BLD AUTO: 6.23 10*3/MM3 (ref 3.4–10.8)

## 2025-05-27 PROCEDURE — 83540 ASSAY OF IRON: CPT

## 2025-05-27 PROCEDURE — 80053 COMPREHEN METABOLIC PANEL: CPT

## 2025-05-27 PROCEDURE — 84466 ASSAY OF TRANSFERRIN: CPT

## 2025-05-27 PROCEDURE — 85025 COMPLETE CBC W/AUTO DIFF WBC: CPT

## 2025-05-27 PROCEDURE — 82728 ASSAY OF FERRITIN: CPT

## 2025-05-27 PROCEDURE — 36415 COLL VENOUS BLD VENIPUNCTURE: CPT

## 2025-05-27 RX ORDER — MONTELUKAST SODIUM 10 MG/1
10 TABLET ORAL NIGHTLY
Qty: 30 TABLET | Refills: 11 | Status: SHIPPED | OUTPATIENT
Start: 2025-05-27

## 2025-06-05 ENCOUNTER — OFFICE VISIT (OUTPATIENT)
Dept: ONCOLOGY | Facility: HOSPITAL | Age: 65
End: 2025-06-05
Payer: MEDICARE

## 2025-06-05 VITALS
DIASTOLIC BLOOD PRESSURE: 65 MMHG | OXYGEN SATURATION: 96 % | WEIGHT: 185.4 LBS | HEART RATE: 70 BPM | TEMPERATURE: 97.3 F | BODY MASS INDEX: 25.96 KG/M2 | HEIGHT: 71 IN | SYSTOLIC BLOOD PRESSURE: 113 MMHG | RESPIRATION RATE: 16 BRPM

## 2025-06-05 DIAGNOSIS — D69.6 THROMBOCYTOPENIA: ICD-10-CM

## 2025-06-05 DIAGNOSIS — D64.9 ANEMIA, UNSPECIFIED TYPE: Primary | ICD-10-CM

## 2025-06-05 DIAGNOSIS — K76.0 FATTY LIVER: ICD-10-CM

## 2025-06-05 PROCEDURE — G0463 HOSPITAL OUTPT CLINIC VISIT: HCPCS | Performed by: INTERNAL MEDICINE

## 2025-06-05 NOTE — PROGRESS NOTES
Chief Complaint/Care Team   anemia, thrombocytopenia    Provider, No Known  Keenan Craft MD    History of Present Illness     Diagnosis: Iron deficiency anemia (secondary to AVMs seen on colonoscopy from 2018 and cecum)    Mild Thrombocytopenia    Elevation in PSA- underwent MRI prostate on 7/2023 was negative for prostate cancer, monitored by urologist Dr. Hightower    Current Treatment: Active surveillance  Previous Treatment: Patient reports history of receiving PRBC transfusions 3 years ago for anemia    Andry Harper is a 65 y.o. male who presents to [facility] for evaluation of thrombocytopenia and iron deficiency anemia.  Reports history of thrombocytopenia for the past 5 to 6 years, denies any known liver disease, denies any family history of any congenital blood disorders or anemia.  He reports a history of his father with unknown type of lymphoma, history of brother with metastatic kidney cancer.    He denies any blood loss or melena.  Does report history of kinase level and basic labs a few weeks ago was evaluated the ER) number secondary to having difficulty stopping the bleeding.  He reports history of receiving PRBC transfusion 3 years ago.  He is currently not taking any iron tablets and has not received IV iron infusion.    Regarding iron deficiency anemia, history of AVM per colonoscopy from October 2018 which revealed a single medium angioectasia in the cecum, status post APC to the right colon, also had 2 sessile polyps that were removed.    Interval History: Pt here again to follow up regarding SHAHEEN and thrombocytopenia, he underwent labs and he is here to discuss these results, pt without any blood loss, melena, pica, bruising.     Review of Systems   Constitutional:  Negative for appetite change, diaphoresis, fatigue, fever, unexpected weight gain and unexpected weight loss.   HENT:  Negative for hearing loss, mouth sores, sore throat, swollen glands,  "trouble swallowing and voice change.    Eyes:  Negative for blurred vision.   Respiratory:  Negative for cough, shortness of breath and wheezing.    Cardiovascular:  Negative for chest pain and palpitations.   Gastrointestinal:  Negative for abdominal pain, blood in stool, constipation, diarrhea, nausea and vomiting.   Endocrine: Negative for cold intolerance and heat intolerance.   Genitourinary:  Negative for difficulty urinating, dysuria, frequency, hematuria and urinary incontinence.   Musculoskeletal:  Negative for arthralgias, back pain and myalgias.   Skin:  Negative for rash, skin lesions and wound.   Neurological:  Negative for dizziness, seizures, weakness, numbness and headache.   Hematological:  Does not bruise/bleed easily.   Psychiatric/Behavioral:  Negative for depressed mood. The patient is not nervous/anxious.    All other systems reviewed and are negative.       Oncology/Hematology History    No history exists.       Objective     Vitals:    06/05/25 1304   BP: 113/65   Pulse: 70   Resp: 16   Temp: 97.3 °F (36.3 °C)   TempSrc: Temporal   SpO2: 96%   Weight: 84.1 kg (185 lb 6.4 oz)   Height: 180.3 cm (71\")   PainSc: 0-No pain         ECOG score: 0         PHQ-9 Total Score:         Physical Exam  Vitals reviewed. Exam conducted with a chaperone present.   Constitutional:       General: He is not in acute distress.  HENT:      Head: Normocephalic and atraumatic.   Eyes:      Extraocular Movements: Extraocular movements intact.      Conjunctiva/sclera: Conjunctivae normal.   Pulmonary:      Effort: Pulmonary effort is normal.   Skin:     Findings: No bruising.      Comments: No petechiae visualized   Neurological:      Mental Status: He is alert and oriented to person, place, and time.           Past Medical History     Past Medical History:   Diagnosis Date    Anemia     AVM (arteriovenous malformation)     cecal AVM    Colon polyp     History of transfusion     Hyperlipidemia     Hypertension     " Lung disease      Current Outpatient Medications on File Prior to Visit   Medication Sig Dispense Refill    albuterol sulfate  (90 Base) MCG/ACT inhaler Inhale 2 puffs Every 4 (Four) Hours As Needed for Wheezing. 18 g 11    atorvastatin (LIPITOR) 20 MG tablet Take 1 tablet by mouth Daily. 30 tablet 5    budesonide-formoterol (SYMBICORT) 160-4.5 MCG/ACT inhaler Inhale 2 puffs by mouth twice a day 10.2 g 5    montelukast (SINGULAIR) 10 MG tablet TAKE ONE TABLET BY MOUTH ONCE NIGHTLY 30 tablet 11    PreviDent 5000 Booster Plus 1.1 % paste       tamsulosin (FLOMAX) 0.4 MG capsule 24 hr capsule Take 2 capsules by mouth Daily for 360 days. 180 capsule 3    finasteride (PROSCAR) 5 MG tablet Take 1 tablet by mouth Daily for 360 days. (Patient not taking: Reported on 6/5/2025) 90 tablet 3     No current facility-administered medications on file prior to visit.      Allergies   Allergen Reactions    Penicillins Unknown - Low Severity     Past Surgical History:   Procedure Laterality Date    COLONOSCOPY  10/26/2018    5 YR RECALL - POLYPS    COLONOSCOPY N/A 8/14/2023    Procedure: COLONOSCOPY WITH POLYPECTOMY/SNARE;  Surgeon: Ion Carrera MD;  Location: [facility] ENDOSCOPY;  Service: Gastroenterology;  Laterality: N/A;  COLON POLYP  POOR BOWEL PREP    COLONOSCOPY N/A 10/16/2023    Procedure: COLONOSCOPY;  Surgeon: Ion Carrera MD;  Location: [facility] ENDOSCOPY;  Service: Gastroenterology;  Laterality: N/A;  NORMAL COLONOSCOPY    CYST REMOVAL      Neck    LUNG BIOPSY Right      Social History     Socioeconomic History    Marital status: Single   Tobacco Use    Smoking status: Never     Passive exposure: Past    Smokeless tobacco: Never   Vaping Use    Vaping status: Never Used   Substance and Sexual Activity    Alcohol use: Not Currently    Drug use: Never    Sexual activity: Defer     Family History   Problem Relation Age of Onset    Stroke Mother     Lymphoma Father     Cancer Father     Heart disease  Brother     Heart attack Brother     Stroke Brother     Diabetes Brother     Cancer Brother     Kidney disease Brother     Colon cancer Neg Hx        Results     Result Review   The following data was reviewed by: Jamarcus Villela MD on 02/07/2024:  Lab Results   Component Value Date    HGB 15.7 05/27/2025    HCT 46.6 05/27/2025    MCV 94.3 05/27/2025     (L) 05/27/2025    WBC 6.23 05/27/2025    NEUTROABS 3.17 05/27/2025    LYMPHSABS 2.41 05/27/2025    MONOSABS 0.46 05/27/2025    EOSABS 0.12 05/27/2025    BASOSABS 0.03 05/27/2025     Lab Results   Component Value Date    GLUCOSE 111 (H) 05/27/2025    BUN 13.8 05/27/2025    CREATININE 0.88 05/27/2025     05/27/2025    K 4.3 05/27/2025     05/27/2025    CO2 24.5 05/27/2025    CALCIUM 9.2 05/27/2025    PROTEINTOT 7.1 05/27/2025    ALBUMIN 4.3 05/27/2025    BILITOT 0.8 05/27/2025    ALKPHOS 90 05/27/2025    AST 22 05/27/2025    ALT 29 05/27/2025     Lab Results   Component Value Date    MG 2.0 07/10/2021           No radiology results for the last day       Assessment & Plan     Diagnoses and all orders for this visit:    1. Anemia, unspecified type (Primary)  -     CBC & Differential; Future  -     Comprehensive Metabolic Panel; Future  -     Ferritin; Future  -     Iron Profile w/o Ferritin; Future    2. Thrombocytopenia    3. Fatty liver              Andry Harper is a 65 y.o. male who presents to [facility]AT[facility] for Iron deficiency anemia (secondary to AVMs seen on colonoscopy from 2018 and cecum) and Mild Thrombocytopenia.    Mild thrombocytopenia  - Patient with persistent mild decrease in platelet count ranging between 109-120 for the past 3 to 4 years  -Discussed with patient plan to obtain peripheral blood smear, repeat CBC, CMP, ultrasound of liver to assess for cause of his thrombocytopenia  -discussed results of US of liver which revealed hepatic steatosis, recommended diet modification and weight  loss  -discussed results of peripheral blood smear from 2/9/2024 which revealed thrombocytopenia, normal morphology of plts, otherwise normal WBC and RBCs  -discussed the most recent CBC which showed plt count 112K, stable from 120K  -Given mild nature of thrombocytopenia will continue to observe and perform additional workup if platelet count suddenly drops or develops other cytopenias  -Patient again without any blood loss or melena today  6/5/2025: Discussed results of CBC, CMP, iron studies from 5/27/2025, patient with only mild decrease in platelet count, platelet count of 116K, patient with fatty liver contribute to thrombocytopenia, no report of blood loss or melena, will continue monitoring, consult patient concerning symptoms that would warrant earlier medical evaluation.    Iron deficiency anemia  -secondary to AVMs seen on colonoscopy from 2018 and cecum  -Most recent hemoglobin normal at 15.7 and pt with normal iron profile, and pt without evidence of iron deficiency.  -again no reported blood loss or melena today  --Recommend continued monitoring via colonoscopy EGD per GI recommendations.  -Continue to monitor for recurrence will repeat iron studies at next clinic appointment.    Plan for patient follow-up in 6 months with repeat labs CBC, CMP, Iron profile and Ferritin.    Please note that portions of this note were completed with a voice recognition program.    Electronically signed by Jamarcus Villela MD, 06/05/25, 1:30 PM EDT.        Follow Up     I spent 30 minutes caring for Andry on this date of service. This time includes time spent by me in the following activities:preparing for the visit, reviewing tests, obtaining and/or reviewing a separately obtained history, performing a medically appropriate examination and/or evaluation , counseling and educating the patient/family/caregiver, ordering medications, tests, or procedures, referring and communicating with other health care professionals ,  documenting information in the medical record, independently interpreting results and communicating that information with the patient/family/caregiver, and care coordination.    This is an acute or chronic illness that poses a threat to life or bodily function. The above treatment plan involves a high risk of complications and/or mortality of patient management.    The patient was seen and examined. Work by the provider also included review and/or ordering of lab tests, review and/or ordering of radiology tests, review and/or ordering of medicine tests, discussion with other physicians or providers, independent review of data, obtaining old records, review/summation of old records, and/or other review.    I have reviewed the family history, social history, and past medical history for this patient. Previous information and data has been reviewed and updated as needed. I have reviewed and verified the chief complaint, history, and other documentation. The patient was interviewed and examined in the clinic and the chart reviewed. The previous observations, recommendations, and conclusions were reviewed including those of other providers.     The plan was discussed with the patient and/or family. The patient was given time to ask questions and these questions were answered. At the conclusion of their visit they had no additional questions or concerns and all questions were answered to their satisfaction.    Patient was given instructions and counseling regarding his condition or for health maintenance advice. Please see specific information pulled into the AVS if appropriate.

## 2025-06-17 ENCOUNTER — OFFICE VISIT (OUTPATIENT)
Dept: FAMILY MEDICINE CLINIC | Facility: CLINIC | Age: 65
End: 2025-06-17
Payer: MEDICARE

## 2025-06-17 VITALS
WEIGHT: 181.7 LBS | HEIGHT: 71 IN | SYSTOLIC BLOOD PRESSURE: 124 MMHG | OXYGEN SATURATION: 97 % | HEART RATE: 76 BPM | DIASTOLIC BLOOD PRESSURE: 72 MMHG | BODY MASS INDEX: 25.44 KG/M2 | TEMPERATURE: 97.8 F

## 2025-06-17 DIAGNOSIS — R60.9 EDEMA, UNSPECIFIED TYPE: ICD-10-CM

## 2025-06-17 DIAGNOSIS — R60.0 LOCALIZED EDEMA: ICD-10-CM

## 2025-06-17 DIAGNOSIS — R22.43 LOCALIZED SWELLING OF BOTH LOWER LEGS: Primary | ICD-10-CM

## 2025-06-17 LAB
ALBUMIN SERPL-MCNC: 4.2 G/DL (ref 3.5–5.2)
ALBUMIN/GLOB SERPL: 2 G/DL
ALP SERPL-CCNC: 89 U/L (ref 39–117)
ALT SERPL W P-5'-P-CCNC: 36 U/L (ref 1–41)
ANION GAP SERPL CALCULATED.3IONS-SCNC: 12 MMOL/L (ref 5–15)
AST SERPL-CCNC: 28 U/L (ref 1–40)
BASOPHILS # BLD AUTO: 0.03 10*3/MM3 (ref 0–0.2)
BASOPHILS NFR BLD AUTO: 0.5 % (ref 0–1.5)
BILIRUB SERPL-MCNC: 0.6 MG/DL (ref 0–1.2)
BUN SERPL-MCNC: 11 MG/DL (ref 8–23)
BUN/CREAT SERPL: 14.7 (ref 7–25)
CALCIUM SPEC-SCNC: 9.4 MG/DL (ref 8.6–10.5)
CHLORIDE SERPL-SCNC: 103 MMOL/L (ref 98–107)
CO2 SERPL-SCNC: 25 MMOL/L (ref 22–29)
CREAT SERPL-MCNC: 0.75 MG/DL (ref 0.76–1.27)
DEPRECATED RDW RBC AUTO: 44.7 FL (ref 37–54)
EGFRCR SERPLBLD CKD-EPI 2021: 98.9 ML/MIN/1.73
EOSINOPHIL # BLD AUTO: 0.12 10*3/MM3 (ref 0–0.4)
EOSINOPHIL NFR BLD AUTO: 2.1 % (ref 0.3–6.2)
ERYTHROCYTE [DISTWIDTH] IN BLOOD BY AUTOMATED COUNT: 12.8 % (ref 12.3–15.4)
FOLATE SERPL-MCNC: 15.1 NG/ML (ref 4.78–24.2)
GLOBULIN UR ELPH-MCNC: 2.1 GM/DL
GLUCOSE SERPL-MCNC: 144 MG/DL (ref 65–99)
HCT VFR BLD AUTO: 46.3 % (ref 37.5–51)
HGB BLD-MCNC: 15.9 G/DL (ref 13–17.7)
IMM GRANULOCYTES # BLD AUTO: 0.02 10*3/MM3 (ref 0–0.05)
IMM GRANULOCYTES NFR BLD AUTO: 0.4 % (ref 0–0.5)
LYMPHOCYTES # BLD AUTO: 2.32 10*3/MM3 (ref 0.7–3.1)
LYMPHOCYTES NFR BLD AUTO: 41.1 % (ref 19.6–45.3)
MAGNESIUM SERPL-MCNC: 2.1 MG/DL (ref 1.6–2.4)
MCH RBC QN AUTO: 32.5 PG (ref 26.6–33)
MCHC RBC AUTO-ENTMCNC: 34.3 G/DL (ref 31.5–35.7)
MCV RBC AUTO: 94.7 FL (ref 79–97)
MONOCYTES # BLD AUTO: 0.33 10*3/MM3 (ref 0.1–0.9)
MONOCYTES NFR BLD AUTO: 5.9 % (ref 5–12)
NEUTROPHILS NFR BLD AUTO: 2.82 10*3/MM3 (ref 1.7–7)
NEUTROPHILS NFR BLD AUTO: 50 % (ref 42.7–76)
PLATELET # BLD AUTO: 128 10*3/MM3 (ref 140–450)
PMV BLD AUTO: 11.6 FL (ref 6–12)
POTASSIUM SERPL-SCNC: 4.5 MMOL/L (ref 3.5–5.2)
PROT SERPL-MCNC: 6.3 G/DL (ref 6–8.5)
RBC # BLD AUTO: 4.89 10*6/MM3 (ref 4.14–5.8)
SODIUM SERPL-SCNC: 140 MMOL/L (ref 136–145)
VIT B12 BLD-MCNC: 421 PG/ML (ref 211–946)
WBC NRBC COR # BLD AUTO: 5.64 10*3/MM3 (ref 3.4–10.8)

## 2025-06-17 PROCEDURE — 99213 OFFICE O/P EST LOW 20 MIN: CPT | Performed by: FAMILY MEDICINE

## 2025-06-17 PROCEDURE — 1160F RVW MEDS BY RX/DR IN RCRD: CPT | Performed by: FAMILY MEDICINE

## 2025-06-17 PROCEDURE — 1126F AMNT PAIN NOTED NONE PRSNT: CPT | Performed by: FAMILY MEDICINE

## 2025-06-17 PROCEDURE — 82607 VITAMIN B-12: CPT | Performed by: FAMILY MEDICINE

## 2025-06-17 PROCEDURE — 83735 ASSAY OF MAGNESIUM: CPT | Performed by: FAMILY MEDICINE

## 2025-06-17 PROCEDURE — 85025 COMPLETE CBC W/AUTO DIFF WBC: CPT | Performed by: FAMILY MEDICINE

## 2025-06-17 PROCEDURE — 82746 ASSAY OF FOLIC ACID SERUM: CPT | Performed by: FAMILY MEDICINE

## 2025-06-17 PROCEDURE — 80053 COMPREHEN METABOLIC PANEL: CPT | Performed by: FAMILY MEDICINE

## 2025-06-17 PROCEDURE — 1159F MED LIST DOCD IN RCRD: CPT | Performed by: FAMILY MEDICINE

## 2025-06-17 RX ORDER — AMMONIUM LACTATE 12 G/100G
1 CREAM TOPICAL AS NEEDED
COMMUNITY
Start: 2025-06-09

## 2025-06-17 NOTE — PROGRESS NOTES
Chief Complaint  Edema (Pt presents to discuss swelling in bilateral legs below the knee that has been off/on since last Monday after seeing Dermatologist. )    Subjective          Andry Harper presents to [facility]  History of Present Illness  Pt has had bilateral lower leg swelling x 1 week after getting dehydrated last week             Objective   Allergies   Allergen Reactions    Penicillins Unknown - Low Severity     Immunization History   Administered Date(s) Administered    COVID-19 (PFIZER) Purple Cap Monovalent 04/05/2021, 04/26/2021    Flu Vaccine Intradermal Quad 18-64YR 10/07/2020    Flu Vaccine Quad PF >36MO 10/01/2018, 09/30/2019    Fluzone (or Fluarix & Flulaval for VFC) >6mos 11/09/2021, 10/21/2022    Fluzone High-Dose 65+YRS 10/14/2024    Fluzone High-Dose 65+yrs 10/06/2023    Influenza, Unspecified 11/09/2021    Pneumococcal Conjugate 20-Valent (PCV20) 05/07/2024, 07/02/2024    Pneumococcal Polysaccharide (PPSV23) 08/25/2021    Tdap 04/10/2023     Past Medical History:   Diagnosis Date    Anemia     AVM (arteriovenous malformation)     cecal AVM    Colon polyp     History of transfusion     Hyperlipidemia     Hypertension     Lung disease       Past Surgical History:   Procedure Laterality Date    COLONOSCOPY  10/26/2018    5 YR RECALL - POLYPS    COLONOSCOPY N/A 8/14/2023    Procedure: COLONOSCOPY WITH POLYPECTOMY/SNARE;  Surgeon: Ion Carrera MD;  Location: [facility] ENDOSCOPY;  Service: Gastroenterology;  Laterality: N/A;  COLON POLYP  POOR BOWEL PREP    COLONOSCOPY N/A 10/16/2023    Procedure: COLONOSCOPY;  Surgeon: Ion Carrera MD;  Location: [facility] ENDOSCOPY;  Service: Gastroenterology;  Laterality: N/A;  NORMAL COLONOSCOPY    CYST REMOVAL      Neck    LUNG BIOPSY Right       Social History     Socioeconomic History    Marital status: Single   Tobacco Use    Smoking status: Never     Passive exposure: Past    Smokeless tobacco: Never   Vaping Use  "   Vaping status: Never Used   Substance and Sexual Activity    Alcohol use: Not Currently    Drug use: Never    Sexual activity: Defer        Current Outpatient Medications:     albuterol sulfate  (90 Base) MCG/ACT inhaler, Inhale 2 puffs Every 4 (Four) Hours As Needed for Wheezing., Disp: 18 g, Rfl: 11    ammonium lactate (AMLACTIN) 12 % cream, Apply 1 Application topically to the appropriate area as directed As Needed., Disp: , Rfl:     atorvastatin (LIPITOR) 20 MG tablet, Take 1 tablet by mouth Daily., Disp: 30 tablet, Rfl: 5    budesonide-formoterol (SYMBICORT) 160-4.5 MCG/ACT inhaler, Inhale 2 puffs by mouth twice a day, Disp: 10.2 g, Rfl: 5    montelukast (SINGULAIR) 10 MG tablet, TAKE ONE TABLET BY MOUTH ONCE NIGHTLY, Disp: 30 tablet, Rfl: 11    PreviDent 5000 Booster Plus 1.1 % paste, , Disp: , Rfl:     tamsulosin (FLOMAX) 0.4 MG capsule 24 hr capsule, Take 2 capsules by mouth Daily for 360 days., Disp: 180 capsule, Rfl: 3   Family History   Problem Relation Age of Onset    Stroke Mother     Lymphoma Father     Cancer Father     Heart disease Brother     Heart attack Brother     Stroke Brother     Diabetes Brother     Cancer Brother     Kidney disease Brother     Colon cancer Neg Hx           Vital Signs:   Vitals:    06/17/25 0959   BP: 124/72   BP Location: Left arm   Patient Position: Sitting   Cuff Size: Adult   Pulse: 76   Temp: 97.8 °F (36.6 °C)   TempSrc: Temporal   SpO2: 97%   Weight: 82.4 kg (181 lb 11.2 oz)   Height: 180.3 cm (71\")       Review of Systems   Physical Exam  Vitals reviewed.   Constitutional:       Appearance: Normal appearance. He is well-developed.   HENT:      Head: Normocephalic and atraumatic.      Right Ear: External ear normal.      Left Ear: External ear normal.      Mouth/Throat:      Pharynx: No oropharyngeal exudate.   Eyes:      Conjunctiva/sclera: Conjunctivae normal.      Pupils: Pupils are equal, round, and reactive to light.   Cardiovascular:      Rate and " Rhythm: Normal rate and regular rhythm.      Pulses: Normal pulses.      Heart sounds: Normal heart sounds. No murmur heard.     No friction rub. No gallop.      Comments: Bilateral 1+ pitting edema in both lower legs up to calves, no redness or warmth, non-tender.  Pulmonary:      Effort: Pulmonary effort is normal.      Breath sounds: Normal breath sounds. No wheezing or rhonchi.   Abdominal:      General: Abdomen is flat. Bowel sounds are normal. There is no distension.      Palpations: Abdomen is soft. There is no mass.      Tenderness: There is no abdominal tenderness. There is no guarding or rebound.      Hernia: No hernia is present.   Musculoskeletal:         General: Normal range of motion.   Skin:     General: Skin is warm and dry.      Capillary Refill: Capillary refill takes less than 2 seconds.   Neurological:      General: No focal deficit present.      Mental Status: He is alert and oriented to person, place, and time.      Cranial Nerves: No cranial nerve deficit.   Psychiatric:         Mood and Affect: Mood and affect normal.         Behavior: Behavior normal.         Thought Content: Thought content normal.         Judgment: Judgment normal.        Result Review :   The following data was reviewed by: Keenan Craft MD on 06/17/2025:  CMP          11/26/2024    09:09 3/3/2025    10:41 5/27/2025    08:24   CMP   Glucose 106  105  111    BUN 14  13  13.8    Creatinine 0.83  0.87  0.88    EGFR 96.5  95.2  94.2    Sodium 140  140  140    Potassium 4.5  4.5  4.3    Chloride 104  104  103    Calcium 9.5  9.1  9.2    Total Protein 7.1  6.6  7.1    Albumin 4.2  4.0  4.3    Globulin 2.9  2.6  2.8    Total Bilirubin 0.8  0.7  0.8    Alkaline Phosphatase 83  77  90    AST (SGOT) 21  26  22    ALT (SGPT) 31  38  29    Albumin/Globulin Ratio 1.4  1.5  1.5    BUN/Creatinine Ratio 16.9  14.9  15.7    Anion Gap 7.1  10.2  12.5      CBC          11/26/2024    09:09 3/3/2025    10:41 5/27/2025    08:24   CBC   WBC  5.47  5.41  6.23    RBC 4.87  4.96  4.94    Hemoglobin 15.1  16.1  15.7    Hematocrit 46.5  45.5  46.6    MCV 95.5  91.7  94.3    MCH 31.0  32.5  31.8    MCHC 32.5  35.4  33.7    RDW 13.3  13.0  13.0    Platelets 112  126  116      Lipid Panel          3/3/2025    10:41   Lipid Panel   Total Cholesterol 129    Triglycerides 110    HDL Cholesterol 32    VLDL Cholesterol 20    LDL Cholesterol  77    LDL/HDL Ratio 2.34                  Assessment and Plan    Diagnoses and all orders for this visit:    1. Localized swelling of both lower legs (Primary)  -     CBC Auto Differential  -     Comprehensive Metabolic Panel  -     Magnesium  -     Vitamin B12 & Folate  -     Compression Stockings  -     US Renal Bilateral; Future  -     Adult Transthoracic Echo Complete W/ Cont if Necessary Per Protocol; Future    2. Edema, unspecified type  -     CBC Auto Differential  -     Comprehensive Metabolic Panel  -     Magnesium  -     Vitamin B12 & Folate  -     Compression Stockings  -     US Renal Bilateral; Future  -     Adult Transthoracic Echo Complete W/ Cont if Necessary Per Protocol; Future    3. Localized edema  -     Adult Transthoracic Echo Complete W/ Cont if Necessary Per Protocol; Future            Follow Up   Return in about 3 weeks (around 7/8/2025) for Recheck.  Patient was given instructions and counseling regarding his condition or for health maintenance advice. Please see specific information pulled into the AVS if appropriate.

## 2025-06-17 NOTE — PROGRESS NOTES
..  Venipuncture Blood Specimen Collection  Venipuncture performed in Lt arm by Peace Chanel with good hemostasis. Patient tolerated the procedure well without complications.   06/17/25   Pratibha Hinton MA

## 2025-06-20 NOTE — PROGRESS NOTES
Addended by: JG QUINTERO on: 6/20/2025 02:19 PM     Modules accepted: Orders     CBC shows no significant changes.  Follow-up as needed.  Continue to hematology.

## 2025-07-14 ENCOUNTER — HOSPITAL ENCOUNTER (OUTPATIENT)
Dept: ULTRASOUND IMAGING | Facility: HOSPITAL | Age: 65
Discharge: HOME | End: 2025-07-14
Payer: MEDICARE

## 2025-07-14 ENCOUNTER — HOSPITAL ENCOUNTER (OUTPATIENT)
Facility: HOSPITAL | Age: 65
Discharge: HOME | End: 2025-07-14
Payer: MEDICARE

## 2025-07-14 DIAGNOSIS — R22.43 LOCALIZED SWELLING OF BOTH LOWER LEGS: ICD-10-CM

## 2025-07-14 DIAGNOSIS — R60.9 EDEMA, UNSPECIFIED TYPE: ICD-10-CM

## 2025-07-14 DIAGNOSIS — R60.0 LOCALIZED EDEMA: ICD-10-CM

## 2025-07-14 LAB
AORTIC DIMENSIONLESS INDEX: 0.71 (DI)
ASCENDING AORTA: 2.9 CM
AV MEAN PRESS GRAD SYS DOP V1V2: 2 MMHG
AV VMAX SYS DOP: 93.3 CM/SEC
BH CV ECHO MEAS - AO MAX PG: 3.5 MMHG
BH CV ECHO MEAS - AO V2 VTI: 18.2 CM
BH CV ECHO MEAS - AVA(I,D): 3.2 CM2
BH CV ECHO MEAS - EDV(MOD-SP2): 120.5 ML
BH CV ECHO MEAS - EDV(MOD-SP4): 70.5 ML
BH CV ECHO MEAS - EF(MOD-SP2): 56.8 %
BH CV ECHO MEAS - EF(MOD-SP4): 50.6 %
BH CV ECHO MEAS - ESV(MOD-SP2): 52 ML
BH CV ECHO MEAS - ESV(MOD-SP4): 34.8 ML
BH CV ECHO MEAS - IVS/LVPW: 1.29 CM
BH CV ECHO MEAS - IVSD: 0.9 CM
BH CV ECHO MEAS - LA DIMENSION: 2.8 CM
BH CV ECHO MEAS - LAT PEAK E' VEL: 9.7 CM/SEC
BH CV ECHO MEAS - LV DIASTOLIC VOL/BSA (35-75): 35 CM2
BH CV ECHO MEAS - LV MAX PG: 1.44 MMHG
BH CV ECHO MEAS - LV MEAN PG: 0.7 MMHG
BH CV ECHO MEAS - LV SYSTOLIC VOL/BSA (12-30): 17.3 CM2
BH CV ECHO MEAS - LV V1 MAX: 60 CM/SEC
BH CV ECHO MEAS - LV V1 VTI: 12.9 CM
BH CV ECHO MEAS - LVIDD: 4.6 CM
BH CV ECHO MEAS - LVIDS: 2.5 CM
BH CV ECHO MEAS - LVOT AREA: 4.5 CM2
BH CV ECHO MEAS - LVOT DIAM: 2.4 CM
BH CV ECHO MEAS - LVPWD: 0.7 CM
BH CV ECHO MEAS - MED PEAK E' VEL: 9.1 CM/SEC
BH CV ECHO MEAS - MV A MAX VEL: 47.7 CM/SEC
BH CV ECHO MEAS - MV E MAX VEL: 48.8 CM/SEC
BH CV ECHO MEAS - MV E/A: 1.02
BH CV ECHO MEAS - SV(LVOT): 58.5 ML
BH CV ECHO MEAS - SV(MOD-SP2): 68.5 ML
BH CV ECHO MEAS - SV(MOD-SP4): 35.7 ML
BH CV ECHO MEAS - SVI(LVOT): 29 ML/M2
BH CV ECHO MEAS - SVI(MOD-SP2): 34 ML/M2
BH CV ECHO MEAS - SVI(MOD-SP4): 17.7 ML/M2
BH CV ECHO MEAS - TAPSE (>1.6): 1.85 CM
BH CV ECHO MEAS - TR MAX PG: 22.9 MMHG
BH CV ECHO MEAS - TR MAX VEL: 239.2 CM/SEC
BH CV ECHO MEASUREMENTS AVERAGE E/E' RATIO: 5.19
BH CV XLRA - RV BASE: 2.9 CM
BH CV XLRA - TDI S': 8.2 CM/SEC
LEFT ATRIUM VOLUME INDEX: 17.4 ML/M2
LV EF BIPLANE MOD: 56.2 %
SINUS: 2.8 CM

## 2025-07-14 PROCEDURE — 76775 US EXAM ABDO BACK WALL LIM: CPT

## 2025-07-14 PROCEDURE — 93306 TTE W/DOPPLER COMPLETE: CPT

## 2025-07-14 PROCEDURE — 93306 TTE W/DOPPLER COMPLETE: CPT | Performed by: INTERNAL MEDICINE

## 2025-07-30 ENCOUNTER — TELEPHONE (OUTPATIENT)
Dept: UROLOGY | Age: 65
End: 2025-07-30
Payer: MEDICARE

## 2025-07-30 DIAGNOSIS — N40.1 BPH WITH OBSTRUCTION/LOWER URINARY TRACT SYMPTOMS: Primary | ICD-10-CM

## 2025-07-30 DIAGNOSIS — N13.8 BPH WITH OBSTRUCTION/LOWER URINARY TRACT SYMPTOMS: Primary | ICD-10-CM

## 2025-07-30 RX ORDER — FINASTERIDE 5 MG/1
5 TABLET, FILM COATED ORAL DAILY
Qty: 90 TABLET | Refills: 2 | Status: SHIPPED | OUTPATIENT
Start: 2025-07-30

## 2025-07-30 NOTE — TELEPHONE ENCOUNTER
Patient needs a new script sent in for finasteride. Somehow the previous one was cancelled and he took his last pill today.

## 2025-07-31 ENCOUNTER — OFFICE VISIT (OUTPATIENT)
Dept: FAMILY MEDICINE CLINIC | Facility: CLINIC | Age: 65
End: 2025-07-31
Payer: MEDICARE

## 2025-07-31 VITALS
SYSTOLIC BLOOD PRESSURE: 110 MMHG | WEIGHT: 186.5 LBS | DIASTOLIC BLOOD PRESSURE: 62 MMHG | HEIGHT: 71 IN | TEMPERATURE: 98.4 F | OXYGEN SATURATION: 98 % | BODY MASS INDEX: 26.11 KG/M2 | HEART RATE: 80 BPM

## 2025-07-31 DIAGNOSIS — R60.9 EDEMA, UNSPECIFIED TYPE: Primary | ICD-10-CM

## 2025-07-31 PROCEDURE — 1126F AMNT PAIN NOTED NONE PRSNT: CPT | Performed by: FAMILY MEDICINE

## 2025-07-31 PROCEDURE — 1159F MED LIST DOCD IN RCRD: CPT | Performed by: FAMILY MEDICINE

## 2025-07-31 PROCEDURE — 99213 OFFICE O/P EST LOW 20 MIN: CPT | Performed by: FAMILY MEDICINE

## 2025-07-31 PROCEDURE — 1160F RVW MEDS BY RX/DR IN RCRD: CPT | Performed by: FAMILY MEDICINE

## 2025-07-31 NOTE — PROGRESS NOTES
Chief Complaint  Imaging results (Pt states he is here to discuss imaging results on his heart from 7/14/25)    Subjective          Andry Harper presents to [facility]  History of Present Illness  Discussed cardiac echo- mild chf due to decreased left ventricular relaxation    Pt says that swelling has become better- pt uses compression stockings and symptoms have resolved    Pt is going to call his pulmonologist regarding wheezing at night only.      BMI is >= 25 and <30. (Overweight) The following options were offered after discussion;: exercise counseling/recommendations and nutrition counseling/recommendations       Objective   Allergies   Allergen Reactions    Penicillins Unknown - Low Severity     Immunization History   Administered Date(s) Administered    COVID-19 (PFIZER) Purple Cap Monovalent 04/05/2021, 04/26/2021    Flu Vaccine Intradermal Quad 18-64YR 10/07/2020    Flu Vaccine Quad PF >36MO 10/01/2018, 09/30/2019    Fluzone (or Fluarix & Flulaval for VFC) >6mos 11/09/2021, 10/21/2022    Fluzone High-Dose 65+YRS 10/14/2024    Fluzone High-Dose 65+yrs 10/06/2023    Influenza, Unspecified 11/09/2021    Pneumococcal Conjugate 20-Valent (PCV20) 05/07/2024, 07/02/2024    Pneumococcal Polysaccharide (PPSV23) 08/25/2021    Tdap 04/10/2023     Past Medical History:   Diagnosis Date    Anemia     AVM (arteriovenous malformation)     cecal AVM    Colon polyp     History of transfusion     Hyperlipidemia     Hypertension     Lung disease       Past Surgical History:   Procedure Laterality Date    COLONOSCOPY  10/26/2018    5 YR RECALL - POLYPS    COLONOSCOPY N/A 8/14/2023    Procedure: COLONOSCOPY WITH POLYPECTOMY/SNARE;  Surgeon: Ion Carrera MD;  Location: [facility] ENDOSCOPY;  Service: Gastroenterology;  Laterality: N/A;  COLON POLYP  POOR BOWEL PREP    COLONOSCOPY N/A 10/16/2023    Procedure: COLONOSCOPY;  Surgeon: Ion Carrera MD;  Location: [facility] ENDOSCOPY;   "Service: Gastroenterology;  Laterality: N/A;  NORMAL COLONOSCOPY    CYST REMOVAL      Neck    LUNG BIOPSY Right       Social History     Socioeconomic History    Marital status: Single   Tobacco Use    Smoking status: Never     Passive exposure: Past    Smokeless tobacco: Never   Vaping Use    Vaping status: Never Used   Substance and Sexual Activity    Alcohol use: Not Currently    Drug use: Never    Sexual activity: Defer        Current Outpatient Medications:     albuterol sulfate  (90 Base) MCG/ACT inhaler, Inhale 2 puffs Every 4 (Four) Hours As Needed for Wheezing., Disp: 18 g, Rfl: 11    ammonium lactate (AMLACTIN) 12 % cream, Apply 1 Application topically to the appropriate area as directed As Needed., Disp: , Rfl:     atorvastatin (LIPITOR) 20 MG tablet, Take 1 tablet by mouth Daily., Disp: 30 tablet, Rfl: 5    budesonide-formoterol (SYMBICORT) 160-4.5 MCG/ACT inhaler, Inhale 2 puffs by mouth twice a day, Disp: 10.2 g, Rfl: 5    finasteride (PROSCAR) 5 MG tablet, Take 1 tablet by mouth Daily., Disp: 90 tablet, Rfl: 2    montelukast (SINGULAIR) 10 MG tablet, TAKE ONE TABLET BY MOUTH ONCE NIGHTLY, Disp: 30 tablet, Rfl: 11    PreviDent 5000 Booster Plus 1.1 % paste, , Disp: , Rfl:     tamsulosin (FLOMAX) 0.4 MG capsule 24 hr capsule, Take 2 capsules by mouth Daily for 360 days., Disp: 180 capsule, Rfl: 3   Family History   Problem Relation Age of Onset    Stroke Mother     Lymphoma Father     Cancer Father     Heart disease Brother     Heart attack Brother     Stroke Brother     Diabetes Brother     Cancer Brother     Kidney disease Brother     Colon cancer Neg Hx           Vital Signs:   Vitals:    07/31/25 0858   BP: 110/62   BP Location: Left arm   Patient Position: Sitting   Cuff Size: Large Adult   Pulse: 80   Temp: 98.4 °F (36.9 °C)   TempSrc: Temporal   SpO2: 98%   Weight: 84.6 kg (186 lb 8 oz)   Height: 180.3 cm (71\")       Review of Systems   Constitutional:  Negative for fatigue and fever. "   HENT:  Negative for sore throat.    Eyes:  Negative for visual disturbance.   Respiratory:  Positive for wheezing. Negative for apnea, cough and shortness of breath.    Cardiovascular:  Negative for chest pain, palpitations and leg swelling.   Gastrointestinal:  Negative for abdominal pain, diarrhea, nausea and vomiting.   Neurological:  Negative for dizziness, light-headedness and headaches.      Physical Exam  Vitals reviewed.   Constitutional:       Appearance: Normal appearance. He is well-developed.   HENT:      Head: Normocephalic and atraumatic.      Right Ear: External ear normal.      Left Ear: External ear normal.      Mouth/Throat:      Pharynx: No oropharyngeal exudate.   Eyes:      Conjunctiva/sclera: Conjunctivae normal.      Pupils: Pupils are equal, round, and reactive to light.   Cardiovascular:      Rate and Rhythm: Normal rate and regular rhythm.      Pulses: Normal pulses.      Heart sounds: Normal heart sounds. No murmur heard.     No friction rub. No gallop.   Pulmonary:      Effort: Pulmonary effort is normal.      Breath sounds: Normal breath sounds. No wheezing or rhonchi.   Abdominal:      General: Abdomen is flat. Bowel sounds are normal. There is no distension.      Palpations: Abdomen is soft. There is no mass.      Tenderness: There is no abdominal tenderness. There is no guarding or rebound.      Hernia: No hernia is present.   Musculoskeletal:         General: Normal range of motion.   Skin:     General: Skin is warm and dry.      Capillary Refill: Capillary refill takes less than 2 seconds.   Neurological:      General: No focal deficit present.      Mental Status: He is alert and oriented to person, place, and time.      Cranial Nerves: No cranial nerve deficit.   Psychiatric:         Mood and Affect: Mood and affect normal.         Behavior: Behavior normal.         Thought Content: Thought content normal.         Judgment: Judgment normal.        Result Review :   The following  data was reviewed by: Keenan Craft MD on 07/31/2025:  CMP          3/3/2025    10:41 5/27/2025    08:24 6/17/2025    10:29   CMP   Glucose 105  111  144    BUN 13  13.8  11.0    Creatinine 0.87  0.88  0.75    EGFR 95.2  94.2  98.9    Sodium 140  140  140    Potassium 4.5  4.3  4.5    Chloride 104  103  103    Calcium 9.1  9.2  9.4    Total Protein 6.6  7.1  6.3    Albumin 4.0  4.3  4.2    Globulin 2.6  2.8  2.1    Total Bilirubin 0.7  0.8  0.6    Alkaline Phosphatase 77  90  89    AST (SGOT) 26  22  28    ALT (SGPT) 38  29  36    Albumin/Globulin Ratio 1.5  1.5  2.0    BUN/Creatinine Ratio 14.9  15.7  14.7    Anion Gap 10.2  12.5  12.0      CBC          3/3/2025    10:41 5/27/2025    08:24 6/17/2025    10:29   CBC   WBC 5.41  6.23  5.64    RBC 4.96  4.94  4.89    Hemoglobin 16.1  15.7  15.9    Hematocrit 45.5  46.6  46.3    MCV 91.7  94.3  94.7    MCH 32.5  31.8  32.5    MCHC 35.4  33.7  34.3    RDW 13.0  13.0  12.8    Platelets 126  116  128      Lipid Panel          3/3/2025    10:41   Lipid Panel   Total Cholesterol 129    Triglycerides 110    HDL Cholesterol 32    VLDL Cholesterol 20    LDL Cholesterol  77    LDL/HDL Ratio 2.34                  Assessment and Plan    Diagnoses and all orders for this visit:    1. Edema, unspecified type (Primary)    Continue compression stockings        Follow Up   Return in about 3 months (around 10/31/2025) for Recheck.  Patient was given instructions and counseling regarding his condition or for health maintenance advice. Please see specific information pulled into the AVS if appropriate.

## 2025-08-06 ENCOUNTER — TELEPHONE (OUTPATIENT)
Dept: ONCOLOGY | Facility: HOSPITAL | Age: 65
End: 2025-08-06
Payer: MEDICARE

## 2025-08-21 ENCOUNTER — TELEPHONE (OUTPATIENT)
Dept: PULMONOLOGY | Facility: CLINIC | Age: 65
End: 2025-08-21
Payer: MEDICARE

## 2025-08-21 RX ORDER — ALBUTEROL SULFATE 90 UG/1
2 INHALANT RESPIRATORY (INHALATION) EVERY 4 HOURS PRN
Qty: 18 G | Refills: 11 | Status: SHIPPED | OUTPATIENT
Start: 2025-08-21

## 2025-08-21 RX ORDER — BUDESONIDE AND FORMOTEROL FUMARATE DIHYDRATE 160; 4.5 UG/1; UG/1
2 AEROSOL RESPIRATORY (INHALATION) 2 TIMES DAILY
Qty: 10.2 G | Refills: 11 | Status: SHIPPED | OUTPATIENT
Start: 2025-08-21

## (undated) DEVICE — THE SINGLE USE ETRAP – POLYP TRAP IS USED FOR SUCTION RETRIEVAL OF ENDOSCOPICALLY REMOVED POLYPS.: Brand: ETRAP

## (undated) DEVICE — Device

## (undated) DEVICE — SNAR E/S POLYP SNAREMASTER OVL/10MM 2.8X2300MM YEL

## (undated) DEVICE — Device: Brand: DEFENDO AIR/WATER/SUCTION AND BIOPSY VALVE

## (undated) DEVICE — SOL IRRG H2O PL/BG 1000ML STRL

## (undated) DEVICE — SOLIDIFIER LIQLOC PLS 1500CC BT